# Patient Record
Sex: FEMALE | Race: WHITE | NOT HISPANIC OR LATINO | URBAN - METROPOLITAN AREA
[De-identification: names, ages, dates, MRNs, and addresses within clinical notes are randomized per-mention and may not be internally consistent; named-entity substitution may affect disease eponyms.]

---

## 2017-03-11 ENCOUNTER — EMERGENCY (EMERGENCY)
Facility: HOSPITAL | Age: 82
LOS: 0 days | Discharge: HOME | End: 2017-03-11
Admitting: INTERNAL MEDICINE

## 2017-06-27 DIAGNOSIS — E78.00 PURE HYPERCHOLESTEROLEMIA, UNSPECIFIED: ICD-10-CM

## 2017-06-27 DIAGNOSIS — R05 COUGH: ICD-10-CM

## 2017-06-27 DIAGNOSIS — Z90.49 ACQUIRED ABSENCE OF OTHER SPECIFIED PARTS OF DIGESTIVE TRACT: ICD-10-CM

## 2017-06-27 DIAGNOSIS — Z98.891 HISTORY OF UTERINE SCAR FROM PREVIOUS SURGERY: ICD-10-CM

## 2017-06-27 DIAGNOSIS — J40 BRONCHITIS, NOT SPECIFIED AS ACUTE OR CHRONIC: ICD-10-CM

## 2017-06-27 DIAGNOSIS — R19.7 DIARRHEA, UNSPECIFIED: ICD-10-CM

## 2017-06-27 DIAGNOSIS — E78.5 HYPERLIPIDEMIA, UNSPECIFIED: ICD-10-CM

## 2017-06-27 DIAGNOSIS — I10 ESSENTIAL (PRIMARY) HYPERTENSION: ICD-10-CM

## 2018-07-26 ENCOUNTER — INPATIENT (INPATIENT)
Facility: HOSPITAL | Age: 83
LOS: 10 days | Discharge: NURSING, REHAB OR LONG-TERM CARE | End: 2018-08-06
Attending: INTERNAL MEDICINE | Admitting: INTERNAL MEDICINE
Payer: MEDICARE

## 2018-07-26 VITALS
DIASTOLIC BLOOD PRESSURE: 68 MMHG | RESPIRATION RATE: 18 BRPM | HEIGHT: 64 IN | HEART RATE: 76 BPM | TEMPERATURE: 98 F | WEIGHT: 169.98 LBS | SYSTOLIC BLOOD PRESSURE: 136 MMHG | OXYGEN SATURATION: 96 %
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
## 2018-07-26 DIAGNOSIS — I25.5 ISCHEMIC CARDIOMYOPATHY: ICD-10-CM

## 2018-07-26 DIAGNOSIS — S22.089A UNSPECIFIED FRACTURE OF T11-T12 VERTEBRA, INITIAL ENCOUNTER FOR CLOSED FRACTURE: ICD-10-CM

## 2018-07-26 DIAGNOSIS — M54.9 DORSALGIA, UNSPECIFIED: ICD-10-CM

## 2018-07-26 DIAGNOSIS — I25.2 OLD MYOCARDIAL INFARCTION: ICD-10-CM

## 2018-07-26 DIAGNOSIS — E87.1 HYPO-OSMOLALITY AND HYPONATREMIA: ICD-10-CM

## 2018-07-26 DIAGNOSIS — I25.10 ATHEROSCLEROTIC HEART DISEASE OF NATIVE CORONARY ARTERY WITHOUT ANGINA PECTORIS: ICD-10-CM

## 2018-07-26 DIAGNOSIS — E78.5 HYPERLIPIDEMIA, UNSPECIFIED: ICD-10-CM

## 2018-07-26 DIAGNOSIS — Z79.82 LONG TERM (CURRENT) USE OF ASPIRIN: ICD-10-CM

## 2018-07-26 DIAGNOSIS — M19.90 UNSPECIFIED OSTEOARTHRITIS, UNSPECIFIED SITE: ICD-10-CM

## 2018-07-26 DIAGNOSIS — R91.1 SOLITARY PULMONARY NODULE: ICD-10-CM

## 2018-07-26 DIAGNOSIS — I21.9 ACUTE MYOCARDIAL INFARCTION, UNSPECIFIED: ICD-10-CM

## 2018-07-26 DIAGNOSIS — S32.402A UNSPECIFIED FRACTURE OF LEFT ACETABULUM, INITIAL ENCOUNTER FOR CLOSED FRACTURE: ICD-10-CM

## 2018-07-26 DIAGNOSIS — E87.6 HYPOKALEMIA: ICD-10-CM

## 2018-07-26 DIAGNOSIS — I10 ESSENTIAL (PRIMARY) HYPERTENSION: ICD-10-CM

## 2018-07-26 DIAGNOSIS — S32.502A UNSPECIFIED FRACTURE OF LEFT PUBIS, INITIAL ENCOUNTER FOR CLOSED FRACTURE: ICD-10-CM
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
## 2018-07-26 NOTE — ED PROVIDER NOTE - PROGRESS NOTE DETAILS
Imaging reviewed. Labs sent. Will transfer north for trauma consult. case d/w Dr Holley for transfer to  site. Trauma attending Dr Rodríguez aware. Pt seen at [facility].  Trauma consult placed-- spoke to trauma resident Pt seen at [facility].  Trauma consult placed-- spoke to trauma resident. ortho aware of pt. will come see her Pt evaluated after sign out. Trauma and ortho teams aware. Pt is comfortable now. WIll observe and reevaluate. neurosurg--- mri

## 2018-07-26 NOTE — ED PROVIDER NOTE - ATTENDING CONTRIBUTION TO CARE
89f w a hx of HTN & HLD presents w daughter after mechanical trip/fall while in [facility] 2 days prior. Pt reports pain to back and L hip. Pain is sharp, moderate, constant, no radiating, worse w moving. Pt reports hitting head but no LOC. Pt unable to ambulate after. Pt seen in hospital in [facility] and had XR showing T12 fx. No use of blood thinners.    Review of Systems  Constitutional:  No fever or chills.  Eyes:  Negative.  ENMT:  No nasal congestion, discharge, or throat pain.   Cardiac:  No chest pain, syncope, or edema.  Respiratory:  No dyspnea, wheezing, or cough. No hemoptysis.  GI:  No vomiting, diarrhea, or abdominal pain. No melena or hematochezia.  :  No dysuria or hematuria. Normal urine output, no incontinence/retention.   Musculoskeletal:  No myalgia or muscle weakness. +Back pain.  Skin:  No skin rash, jaundice, or lesions. No lacerations, abrasions, or ecchymosis.  Neuro:  No headache, loss of sensation, or focal weakness.  No saddle anesthesia or change in mental status.    Physical Exam  General: Awake, alert, NAD, elderly/frail, non-toxic appearing, NCAT  Eyes: PERRL, EOMI, no icterus, lids and conjunctivae are normal  ENT: External inspection normal, pink/moist membranes, pharynx normal  CV: S1S2, regular rate and rhythm, no murmur/gallops/rubs, no JVD, 2+ pulses b/l, no edema/cords/homans, warm/well-perfused  Respiratory: Normal respiratory rate/effort, no respiratory distress, normal voice, speaking full sentences, lungs clear to auscultation b/l, no wheezing/rales/rhonchi, no retractions, no stridor  Abdomen: Soft abdomen, no tender/distended/guarding/rebound, no CVA tender  Musculoskeletal: FROM all 4 extremities, N/V intact, pelvis stable, +mid-back spinal tender, no deform/step-offs, L hip tender, no deform, no other rebecca tender/deform  Neck: FROM neck, supple, no meningismus, trachea midline, no JVD, no cspine tender/step-offs  Integumentary: Color normal for race, warm and dry, no rash  Neuro: Oriented x3, CN 2-12 intact, normal motor, normal sensory, no saddle anesthesia, normal strength/sensation including distal toes b/l, normal gait, GCS 15  Psych: Oriented x3, mood normal, affect normal    89f w fall and back pain as well as hip pain. non-focal neuro exam, n/v intact. --XR's, Analgesia as needed, observe/re-assess.

## 2018-07-26 NOTE — ED PROVIDER NOTE - OBJECTIVE STATEMENT
Pt is a 89 y/o female with hx of HTN, DLD who presents to ED for left hip pain. Pt had mechanical fall while in [place] 2 days ago, fell backwards hitting head on floor. Pt denies LOC. Pt states went to ED there and found to have lumbar spine compression fx and sent home with tylenol. Pt not able to ambulate due to continuing left hip pain so came to ED for eval. No headache, neck pain, chest pain, SOB, abd pain. Pt is a 89 y/o female with hx of HTN, DLD who presents to ED for left hip pain. Pt had mechanical fall while in [place] 2 days ago, fell backwards hitting head on floor. Pt denies LOC. Pt states went to ED there and found to have T12 compression fx and sent home with tylenol. Pt not able to ambulate due to continuing left hip pain so came to ED for eval. No headache, neck pain, chest pain, SOB, abd pain.

## 2018-07-26 NOTE — ED PROVIDER NOTE - NS ED ROS FT
Constitutional: No altered mental status.  Eyes: No visual changes.  ENT: No hearing loss.  Neck: No neck pain or stiffness.  Cardiovascular: No chest pain, palpitations.  Pulmonary: No SOB. No hemoptysis.  Abdominal: No abdominal pain, nausea, vomiting.   : No hematuria.  Neuro: No headache, syncope, dizziness.  MS: No back pain. No deformities. + left hip pain.  Psych: No suicidal ideations.

## 2018-07-26 NOTE — ED PROVIDER NOTE - PHYSICAL EXAMINATION
Constitutional: Well developed, well nourished. NAD  TRAUMA: ABC intact. GCS 15.  Head: Normocephalic, atraumatic.  Eyes: PERRL. EOMI. No Raccoon eyes.   ENT: No nasal discharge. No septal hematoma. No Santoro sign. Mucous membranes moist.  Neck: Supple. Painless ROM. No midline tenderness, stepoffs.  Cardiovascular: Normal S1, S2. Regular rate and rhythm. No murmurs, rubs, or gallops.  Pulmonary: Normal respiratory rate and effort. Lungs clear to auscultation bilaterally. No wheezing, rales, or rhonchi.  CHEST: No chest wall tenderness, crepitus.  Abdominal: Soft. Nondistended. Nontender. No rebound, guarding, rigidity.  BACK: No midline T/L/S tenderness, stepoffs. No saddle paresthesia.  Extremities. Pelvis stable. + tenderness to left hip.  Skin: No rashes, cyanosis, lacerations, abrasions.  Neuro: AAOx3. Strength 5/5 in all extremities. Sensation intact throughout. No focal neurological deficits.  Psych: Normal mood. Normal affect. Constitutional: Well developed, well nourished. NAD  TRAUMA: ABC intact. GCS 15.  Head: Normocephalic, atraumatic.  Eyes: PERRL. EOMI. No Raccoon eyes.   ENT: No nasal discharge. No septal hematoma. No Santoro sign. Mucous membranes moist.  Neck: Supple. Painless ROM. No midline tenderness, stepoffs.  Cardiovascular: Normal S1, S2. Regular rate and rhythm. No murmurs, rubs, or gallops.  Pulmonary: Normal respiratory rate and effort. Lungs clear to auscultation bilaterally. No wheezing, rales, or rhonchi.  CHEST: No chest wall tenderness, crepitus.  Abdominal: Soft. Nondistended. Nontender. No rebound, guarding, rigidity.  BACK: No midline T/L/S tenderness, stepoffs. No saddle paresthesia.  Extremities. Pelvis stable. + tenderness to left hip. Full ROM of bilateral lower extremities.  Skin: No rashes, cyanosis, lacerations, abrasions.  Neuro: AAOx3. Strength 5/5 in all extremities. Sensation intact throughout. No focal neurological deficits.  Psych: Normal mood. Normal affect.

## 2018-07-26 NOTE — ED PROVIDER NOTE - CARE PLAN
Principal Discharge DX:	Pubic ramus fracture  Secondary Diagnosis:	Compression fracture of body of thoracic vertebra Principal Discharge DX:	Pubic ramus fracture  Secondary Diagnosis:	Compression fracture of body of thoracic vertebra  Secondary Diagnosis:	Fall, initial encounter

## 2018-07-27 DIAGNOSIS — Z98.891 HISTORY OF UTERINE SCAR FROM PREVIOUS SURGERY: Chronic | ICD-10-CM

## 2018-07-27 DIAGNOSIS — Z90.49 ACQUIRED ABSENCE OF OTHER SPECIFIED PARTS OF DIGESTIVE TRACT: Chronic | ICD-10-CM

## 2018-07-27 LAB
ALBUMIN SERPL ELPH-MCNC: 4.1 G/DL — SIGNIFICANT CHANGE UP (ref 3.5–5.2)
ALP SERPL-CCNC: 51 U/L — SIGNIFICANT CHANGE UP (ref 30–115)
ALT FLD-CCNC: 35 U/L — SIGNIFICANT CHANGE UP (ref 0–41)
ANION GAP SERPL CALC-SCNC: 18 MMOL/L — HIGH (ref 7–14)
APTT BLD: 25 SEC — LOW (ref 27–39.2)
APTT BLD: 25.7 SEC — LOW (ref 27–39.2)
AST SERPL-CCNC: 37 U/L — SIGNIFICANT CHANGE UP (ref 0–41)
BILIRUB SERPL-MCNC: 0.6 MG/DL — SIGNIFICANT CHANGE UP (ref 0.2–1.2)
BLD GP AB SCN SERPL QL: SIGNIFICANT CHANGE UP
BUN SERPL-MCNC: 22 MG/DL — HIGH (ref 10–20)
CALCIUM SERPL-MCNC: 9.3 MG/DL — SIGNIFICANT CHANGE UP (ref 8.5–10.1)
CHLORIDE SERPL-SCNC: 90 MMOL/L — LOW (ref 98–110)
CO2 SERPL-SCNC: 24 MMOL/L — SIGNIFICANT CHANGE UP (ref 17–32)
CREAT SERPL-MCNC: 0.9 MG/DL — SIGNIFICANT CHANGE UP (ref 0.7–1.5)
GLUCOSE SERPL-MCNC: 181 MG/DL — HIGH (ref 70–99)
HCT VFR BLD CALC: 43.6 % — SIGNIFICANT CHANGE UP (ref 37–47)
HGB BLD-MCNC: 14.8 G/DL — SIGNIFICANT CHANGE UP (ref 12–16)
INR BLD: 1.09 RATIO — SIGNIFICANT CHANGE UP (ref 0.65–1.3)
INR BLD: 1.19 RATIO — SIGNIFICANT CHANGE UP (ref 0.65–1.3)
MCHC RBC-ENTMCNC: 28.7 PG — SIGNIFICANT CHANGE UP (ref 27–31)
MCHC RBC-ENTMCNC: 33.9 G/DL — SIGNIFICANT CHANGE UP (ref 32–37)
MCV RBC AUTO: 84.7 FL — SIGNIFICANT CHANGE UP (ref 81–99)
NRBC # BLD: 0 /100 WBCS — SIGNIFICANT CHANGE UP (ref 0–0)
PLATELET # BLD AUTO: 218 K/UL — SIGNIFICANT CHANGE UP (ref 130–400)
POTASSIUM SERPL-MCNC: 3.2 MMOL/L — LOW (ref 3.5–5)
POTASSIUM SERPL-SCNC: 3.2 MMOL/L — LOW (ref 3.5–5)
PROT SERPL-MCNC: 7.1 G/DL — SIGNIFICANT CHANGE UP (ref 6–8)
PROTHROM AB SERPL-ACNC: 11.8 SEC — SIGNIFICANT CHANGE UP (ref 9.95–12.87)
PROTHROM AB SERPL-ACNC: 12.8 SEC — SIGNIFICANT CHANGE UP (ref 9.95–12.87)
RBC # BLD: 5.15 M/UL — SIGNIFICANT CHANGE UP (ref 4.2–5.4)
RBC # FLD: 13.5 % — SIGNIFICANT CHANGE UP (ref 11.5–14.5)
SODIUM SERPL-SCNC: 132 MMOL/L — LOW (ref 135–146)
TYPE + AB SCN PNL BLD: SIGNIFICANT CHANGE UP
WBC # BLD: 13.24 K/UL — HIGH (ref 4.8–10.8)
WBC # FLD AUTO: 13.24 K/UL — HIGH (ref 4.8–10.8)

## 2018-07-27 PROCEDURE — 99223 1ST HOSP IP/OBS HIGH 75: CPT

## 2018-07-27 RX ORDER — SENNA PLUS 8.6 MG/1
2 TABLET ORAL AT BEDTIME
Qty: 0 | Refills: 0 | Status: DISCONTINUED | OUTPATIENT
Start: 2018-07-27 | End: 2018-08-03

## 2018-07-27 RX ORDER — LISINOPRIL 2.5 MG/1
20 TABLET ORAL DAILY
Qty: 0 | Refills: 0 | Status: DISCONTINUED | OUTPATIENT
Start: 2018-07-27 | End: 2018-08-03

## 2018-07-27 RX ORDER — LISINOPRIL 2.5 MG/1
0 TABLET ORAL
Qty: 0 | Refills: 0 | COMMUNITY

## 2018-07-27 RX ORDER — MORPHINE SULFATE 50 MG/1
2 CAPSULE, EXTENDED RELEASE ORAL ONCE
Qty: 0 | Refills: 0 | Status: DISCONTINUED | OUTPATIENT
Start: 2018-07-27 | End: 2018-07-27

## 2018-07-27 RX ORDER — ACETAMINOPHEN 500 MG
650 TABLET ORAL EVERY 6 HOURS
Qty: 0 | Refills: 0 | Status: DISCONTINUED | OUTPATIENT
Start: 2018-07-27 | End: 2018-08-03

## 2018-07-27 RX ORDER — SIMVASTATIN 20 MG/1
40 TABLET, FILM COATED ORAL AT BEDTIME
Qty: 0 | Refills: 0 | Status: DISCONTINUED | OUTPATIENT
Start: 2018-07-27 | End: 2018-08-03

## 2018-07-27 RX ORDER — POTASSIUM CHLORIDE 20 MEQ
40 PACKET (EA) ORAL ONCE
Qty: 0 | Refills: 0 | Status: COMPLETED | OUTPATIENT
Start: 2018-07-27 | End: 2018-07-27

## 2018-07-27 RX ORDER — ENOXAPARIN SODIUM 100 MG/ML
40 INJECTION SUBCUTANEOUS AT BEDTIME
Qty: 0 | Refills: 0 | Status: DISCONTINUED | OUTPATIENT
Start: 2018-07-27 | End: 2018-08-03

## 2018-07-27 RX ORDER — HYDROCHLOROTHIAZIDE 25 MG
25 TABLET ORAL DAILY
Qty: 0 | Refills: 0 | Status: DISCONTINUED | OUTPATIENT
Start: 2018-07-27 | End: 2018-07-27

## 2018-07-27 RX ORDER — MORPHINE SULFATE 50 MG/1
2 CAPSULE, EXTENDED RELEASE ORAL
Qty: 0 | Refills: 0 | Status: DISCONTINUED | OUTPATIENT
Start: 2018-07-27 | End: 2018-07-31

## 2018-07-27 RX ORDER — ASPIRIN/CALCIUM CARB/MAGNESIUM 324 MG
81 TABLET ORAL DAILY
Qty: 0 | Refills: 0 | Status: DISCONTINUED | OUTPATIENT
Start: 2018-07-27 | End: 2018-08-03

## 2018-07-27 RX ORDER — DOCUSATE SODIUM 100 MG
100 CAPSULE ORAL
Qty: 0 | Refills: 0 | Status: DISCONTINUED | OUTPATIENT
Start: 2018-07-27 | End: 2018-08-03

## 2018-07-27 RX ORDER — SODIUM CHLORIDE 9 MG/ML
1000 INJECTION INTRAMUSCULAR; INTRAVENOUS; SUBCUTANEOUS
Qty: 0 | Refills: 0 | Status: DISCONTINUED | OUTPATIENT
Start: 2018-07-27 | End: 2018-07-29

## 2018-07-27 RX ORDER — SIMVASTATIN 20 MG/1
0 TABLET, FILM COATED ORAL
Qty: 0 | Refills: 0 | COMMUNITY

## 2018-07-27 RX ADMIN — Medication 40 MILLIEQUIVALENT(S): at 07:42

## 2018-07-27 RX ADMIN — MORPHINE SULFATE 2 MILLIGRAM(S): 50 CAPSULE, EXTENDED RELEASE ORAL at 20:22

## 2018-07-27 RX ADMIN — MORPHINE SULFATE 2 MILLIGRAM(S): 50 CAPSULE, EXTENDED RELEASE ORAL at 02:41

## 2018-07-27 RX ADMIN — SIMVASTATIN 40 MILLIGRAM(S): 20 TABLET, FILM COATED ORAL at 21:31

## 2018-07-27 RX ADMIN — Medication 25 MILLIGRAM(S): at 07:43

## 2018-07-27 RX ADMIN — MORPHINE SULFATE 2 MILLIGRAM(S): 50 CAPSULE, EXTENDED RELEASE ORAL at 08:15

## 2018-07-27 RX ADMIN — MORPHINE SULFATE 2 MILLIGRAM(S): 50 CAPSULE, EXTENDED RELEASE ORAL at 07:59

## 2018-07-27 RX ADMIN — SENNA PLUS 2 TABLET(S): 8.6 TABLET ORAL at 21:31

## 2018-07-27 RX ADMIN — ENOXAPARIN SODIUM 40 MILLIGRAM(S): 100 INJECTION SUBCUTANEOUS at 21:31

## 2018-07-27 RX ADMIN — SODIUM CHLORIDE 75 MILLILITER(S): 9 INJECTION INTRAMUSCULAR; INTRAVENOUS; SUBCUTANEOUS at 02:42

## 2018-07-27 RX ADMIN — LISINOPRIL 20 MILLIGRAM(S): 2.5 TABLET ORAL at 07:43

## 2018-07-27 RX ADMIN — Medication 81 MILLIGRAM(S): at 12:08

## 2018-07-27 RX ADMIN — Medication 1 TABLET(S): at 12:08

## 2018-07-27 RX ADMIN — Medication 100 MILLIGRAM(S): at 17:06

## 2018-07-27 RX ADMIN — SODIUM CHLORIDE 75 MILLILITER(S): 9 INJECTION INTRAMUSCULAR; INTRAVENOUS; SUBCUTANEOUS at 05:27

## 2018-07-27 NOTE — CONSULT NOTE ADULT - SUBJECTIVE AND OBJECTIVE BOX
Patient is a 89y old  Female who presents with a chief complaint of s/p fall ,transferred fro [facility] for trauma work up  found to have T 12 and pelvic fracture (2018 06:13)      HPI:  89 F with Hx of HTN, HLD ,arthritis ,transferred from [facility] for trauma work up .   She was complaining of  back and Left lateral thigh pain of 2 day duration after a mechanical fall at a hotel in [facility].  Patient stated  she was waiting for elevator when she tripped and fall backwards, landing on her left posterior hip and back.  Patient denies LOC.  Visited an [facility] where she was prescribed tylenol , [place] returning to the [place] she came straight to the ED here at [facility] first and than was transferred here at [place] for further work up.    She also  reported  limiting her mobility due to exacerbation of her back pain.  Pain is localized to the left lumbar region and hip.    pt was takings aspirin 325 mg daily ,as per pt it helped with her arthritis and good for heart.  no c/o headache, N/V ,fever and chills ,no c/o urinary or fecal incontinence ,neither any weakness or paresthesias     In ER ;  · Temp (F): 97.8	  · Temp (C) Temp (C): 36.6	  · Temp site Temp Site: oral	  · Heart Rate Heart Rate (beats/min): 76	  · BP Systolic Systolic: 136	  · BP Diastolic Diastolic (mm Hg): 68	  · Respiration Rate (breaths/min) Respiration Rate (breaths/min): 18	  · SpO2 (%) SpO2 (%): 96	  · O2 delivery Patient On: room air	    trauma work up done;  found to have Left acetabular and T12 compression fx   trauma, ortho and Neurosurgery consulted  later ,pt was cleaerd by trauma, no intervention as per ortho and NS has requested MRI of lumber and thoracic spine  got IV morphine and 1 L NS in ER (2018 06:13)      PAST MEDICAL & SURGICAL HISTORY:  Arthritis  High cholesterol  Hypertension  History of   S/P cholecystectomy      SOCIAL HX:  FAMILY HISTORY:  No pertinent family history in first degree relatives    Allergies    No Known Allergies    Intolerances        PHYSICAL EXAM  Vital Signs Last 24 Hrs  T(C): 36.2 (2018 16:04), Max: 36.8 (2018 00:39)  T(F): 97.1 (2018 16:04), Max: 98.2 (2018 00:39)  HR: 77 (2018 16:04) (76 - 81)  BP: 122/77 (2018 16:04) (122/77 - 151/68)  BP(mean): --  RR: 18 (2018 16:04) (18 - 18)  SpO2: 99% (2018 16:04) (96% - 99%)  I&O's Summary      General: Comfortable in bed  HEENT:  On NC  Lymph node: No palpable LN             Lungs: CTA  Cardiovascular: RRR, S1S2  Abdomen: BS+ve; soft non tender  Extremities: No LE edema. Pain on palpation of left hip  Skin:  No evident Rash  Neurological:  AAOx3; No focal deficit    LABS:                          14.8   13.24 )-----------( 218      ( 2018 00:35 )             43.6       07-27    132<L>  |  90<L>  |  22<H>  ----------------------------<  181<H>  3.2<L>   |  24  |  0.9    Ca    9.3      2018 00:35    TPro  7.1  /  Alb  4.1  /  TBili  0.6  /  DBili  x   /  AST  37  /  ALT  35  /  AlkPhos  51  07-27      PT/INR - ( 2018 02:06 )   PT: 12.80 sec;   INR: 1.19 ratio         PTT - ( 2018 02:06 )  PTT:25.7 sec              LIVER FUNCTIONS - ( 2018 00:35 )  Alb: 4.1 g/dL / Pro: 7.1 g/dL / ALK PHOS: 51 U/L / ALT: 35 U/L / AST: 37 U/L / GGT: x               MEDICATIONS  (STANDING):  aspirin  chewable 81 milliGRAM(s) Oral daily  docusate sodium 100 milliGRAM(s) Oral two times a day  enoxaparin Injectable 40 milliGRAM(s) SubCutaneous at bedtime  hydrochlorothiazide 25 milliGRAM(s) Oral daily  lisinopril 20 milliGRAM(s) Oral daily  multivitamin 1 Tablet(s) Oral daily  senna 2 Tablet(s) Oral at bedtime  simvastatin 40 milliGRAM(s) Oral at bedtime  sodium chloride 0.9%. 1000 milliLiter(s) (75 mL/Hr) IV Continuous <Continuous>    MEDICATIONS  (PRN):  acetaminophen   Tablet 650 milliGRAM(s) Oral every 6 hours PRN pain  morphine  - Injectable 2 milliGRAM(s) IV Push five times a day PRN Severe Pain (7 - 10)      Radiology:  < from: Xray Chest 1 View- PORTABLE-Urgent (18 @ 02:39) >  Impression:      No radiographic evidence of acute cardiopulmonary disease.    < end of copied text >    < from: CT Chest w/ IV Cont (18 @ 03:01) >  IMPRESSION:   1. Acute, nondisplaced left inferior pubic ramus and left acetabular   column fractures.  2. New severe T12 vertebral body compression fracture with minimal   osseous retropulsion; age indeterminate and possibly acute.  3. Multiple left lower lobe pulmonary nodules, measuring up to 0.9 cm.   Per Fleischner Society 2017 guidelines, follow-up CT chest in 3-6 months   recommended.    < end of copied text >

## 2018-07-27 NOTE — H&P ADULT - ASSESSMENT
89 F with Hx of HTN, HLD ,arthritis ,transferred from [facility] for trauma work up  after a mechanical fall 2 days ago ,found to have pelvic and T12 fx.    # s/p mechanical fall;  L acetabular fx and T 12 fx  cleared by trauma   Weight bearing as tolerated as per ortho  no intervention for pelvic fx as per ortho  Neuro Surgery ; for T 12  spine fx >> MRI T and L spine recommended  no weakness or spinal anesthesia ,no urinary or fecal incontinence   cw pain control   PT/rehab after NS recs    #HTN/DLD;  cw lisinopril and HCTZ and statin   aspirin dose decreased to 81 mg     #DVT ppx; cw lovenox    #Diet; DASH    #activity ; as tolerated  Weight bearing as tolerated as per ortho    disposal; Pending Neurosurgery recs and MRI L/T spine 89 F with Hx of HTN, HLD ,arthritis ,transferred from [facility] for trauma work up  after a mechanical fall 2 days ago ,found to have pelvic and T12 fx.    # s/p mechanical fall;  L acetabular fx and T 12 fx  cleared by trauma   Weight bearing as tolerated as per ortho  no intervention for pelvic fx as per ortho  Neuro Surgery ; for T 12  spine fx >> MRI T and L spine recommended  no weakness or spinal anesthesia ,no urinary or fecal incontinence   cw pain control   PT/rehab after NS recs    #HTN/DLD;  cw lisinopril and HCTZ and statin   aspirin dose decreased to 81 mg     #Hypokalemia;  repleted ,follow 11 am BMP     #DVT ppx; cw lovenox    #Diet; DASH    #activity ; as tolerated  Weight bearing as tolerated as per ortho    disposal; Pending Neurosurgery recs and MRI L/T spine

## 2018-07-27 NOTE — PATIENT PROFILE ADULT. - VISION (WITH CORRECTIVE LENSES IF THE PATIENT USUALLY WEARS THEM):
Partially impaired: cannot see medication labels or newsprint, but can see obstacles in path, and the surrounding layout; can count fingers at arm's length/uses glasses for reading

## 2018-07-27 NOTE — CONSULT NOTE ADULT - SUBJECTIVE AND OBJECTIVE BOX
Pt Name: LOW GUZMÁN  MRN: [id number]  Orthopedic Consult:Pelvic Fx      HPI: Patient is a 89y old  Female who presents with a chief complaint of L groin pain. Patient had a fall while on vacation 2 days ago. difficulty ambulating since that time.  Patient denies head trauma or LOC. Also complaining of back pain. Denies paresthesias.      PAST MEDICAL & SURGICAL HISTORY:  High cholesterol  Hypertension  No significant past surgical history      Allergies: No Known Allergies      Medications: sodium chloride 0.9%. 1000 milliLiter(s) IV Continuous <Continuous>        PHYSICAL EXAM:    Vital Signs Last 24 Hrs  T(C): 36.8 (27 Jul 2018 00:39), Max: 36.8 (27 Jul 2018 00:39)  T(F): 98.2 (27 Jul 2018 00:39), Max: 98.2 (27 Jul 2018 00:39)  HR: 80 (27 Jul 2018 00:39) (76 - 80)  BP: 130/63 (27 Jul 2018 00:39) (130/63 - 136/68)  BP(mean): --  RR: 18 (27 Jul 2018 00:39) (18 - 18)  SpO2: 98% (27 Jul 2018 00:39) (96% - 98%)    Physical Exam:  General: NAD, Alert, Awake and oriented  Neurologic: No gross deficits, moving all 4s.  Head: NCAT without abrasions, lacerations, or ecchymosis to head, face, or scalp  Musculoskeletal:          HIPS and PELVIS: Pelvis stable to AP and Lat compression.  TTP to AP and Lateral compression. Unable to actively SLR on L.  Negative Log Roll, Negative Heel strike bilaterally. No pain on internal/external rotation of the hips.      LEFT LE: No open skin. No deformities  or other signs of trauma at hip, thigh, knee, leg, ankle or foot.  Negative log-roll and heel strike. SILT toes 1-5. No bony TTP to hip, knee or ankle. 2+ DP/PT pulses with brisk cap refill distally. Compartments soft and compressible. No pain on passive stretch. Strength 5/5.                            14.8   13.24 )-----------( 218      ( 27 Jul 2018 00:35 )             43.6     07-27    132<L>  |  90<L>  |  22<H>  ----------------------------<  181<H>  3.2<L>   |  24  |  0.9    Ca    9.3      27 Jul 2018 00:35    TPro  7.1  /  Alb  4.1  /  TBili  0.6  /  DBili  x   /  AST  37  /  ALT  35  /  AlkPhos  51  07-27    PT/INR - ( 27 Jul 2018 02:06 )   PT: 12.80 sec;   INR: 1.19 ratio         PTT - ( 27 Jul 2018 02:06 )  PTT:25.7 sec    Imaging:Superior and inferior pubic rami fracture on L. T12 compression fracture    A/P:    Pt is a  89y Female with LC1 variant pelvic fx  -No acute orthopedic intervention  - Pain control  - Care of spine fx per trauma  - From pelvic perspective, WBAT BLE  -PT/OT  - OOBTC  - DVT ppx Pt Name: LOW GUZMÁN  MRN: [id number]  Orthopedic Consult:Pelvic Fx      HPI: Patient is a 89y old  Female who presents with a chief complaint of L groin pain. Patient had a fall while on vacation 2 days ago. difficulty ambulating since that time.  Patient denies head trauma or LOC. Also complaining of back pain. Denies paresthesias.      PAST MEDICAL & SURGICAL HISTORY:  High cholesterol  Hypertension  No significant past surgical history      Allergies: No Known Allergies      Medications: sodium chloride 0.9%. 1000 milliLiter(s) IV Continuous <Continuous>        PHYSICAL EXAM:    Vital Signs Last 24 Hrs  T(C): 36.8 (27 Jul 2018 00:39), Max: 36.8 (27 Jul 2018 00:39)  T(F): 98.2 (27 Jul 2018 00:39), Max: 98.2 (27 Jul 2018 00:39)  HR: 80 (27 Jul 2018 00:39) (76 - 80)  BP: 130/63 (27 Jul 2018 00:39) (130/63 - 136/68)  BP(mean): --  RR: 18 (27 Jul 2018 00:39) (18 - 18)  SpO2: 98% (27 Jul 2018 00:39) (96% - 98%)    Physical Exam:  General: NAD, Alert, Awake and oriented  Neurologic: No gross deficits, moving all 4s.  Head: NCAT without abrasions, lacerations, or ecchymosis to head, face, or scalp  Musculoskeletal:          HIPS and PELVIS: Pelvis stable to AP and Lat compression.  TTP to AP and Lateral compression. Unable to actively SLR on L.  Negative Log Roll, Negative Heel strike bilaterally. No pain on internal/external rotation of the hips.      LEFT LE: No open skin. No deformities  or other signs of trauma at hip, thigh, knee, leg, ankle or foot.  Negative log-roll and heel strike. SILT toes 1-5. No bony TTP to hip, knee or ankle. 2+ DP/PT pulses with brisk cap refill distally. Compartments soft and compressible. No pain on passive stretch. Strength 5/5.                            14.8   13.24 )-----------( 218      ( 27 Jul 2018 00:35 )             43.6     07-27    132<L>  |  90<L>  |  22<H>  ----------------------------<  181<H>  3.2<L>   |  24  |  0.9    Ca    9.3      27 Jul 2018 00:35    TPro  7.1  /  Alb  4.1  /  TBili  0.6  /  DBili  x   /  AST  37  /  ALT  35  /  AlkPhos  51  07-27    PT/INR - ( 27 Jul 2018 02:06 )   PT: 12.80 sec;   INR: 1.19 ratio         PTT - ( 27 Jul 2018 02:06 )  PTT:25.7 sec    Imaging:Superior and inferior pubic rami fracture on L. T12 compression fracture    A/P:    Pt is a  89y Female with LC1 variant pelvic fx  -No acute orthopedic intervention  - Pain control  - Care of spine fx per trauma  - From pelvic perspective, WBAT BLE  -PT/OT  - OOBTC  - DVT ppx  pt seen and examined  agree with plan

## 2018-07-27 NOTE — CONSULT NOTE ADULT - ASSESSMENT
IMPRESSION:  Pulmonary nodules      PLAN:  Pt will need a repeat CT chest in 3-6 months  Follow up with Dr. Donnelly in [facility] upon discharge IMPRESSION:  Pulmonary nodules      PLAN:  Pt will need a repeat CT chest and follow up with Dr. Donnelly in 6 months

## 2018-07-27 NOTE — H&P ADULT - NSHPLABSRESULTS_GEN_ALL_CORE
14.8   13.24 )-----------( 218      ( 27 Jul 2018 00:35 )             43.6   07-27    132<L>  |  90<L>  |  22<H>  ----------------------------<  181<H>  3.2<L>   |  24  |  0.9    Ca    9.3      27 Jul 2018 00:35    TPro  7.1  /  Alb  4.1  /  TBili  0.6  /  DBili  x   /  AST  37  /  ALT  35  /  AlkPhos  51  07-27  LIVER FUNCTIONS - ( 27 Jul 2018 00:35 )  Alb: 4.1 g/dL / Pro: 7.1 g/dL / ALK PHOS: 51 U/L / ALT: 35 U/L / AST: 37 U/L / GGT: x         PT/INR - ( 27 Jul 2018 02:06 )   PT: 12.80 sec;   INR: 1.19 ratio         PTT - ( 27 Jul 2018 02:06 )  PTT:25.7 sec    < from: CT Abdomen and Pelvis w/ IV Cont (07.27.18 @ 03:01) >    IMPRESSION:   1. Acute, nondisplaced left inferior pubic ramus and left acetabular   column fractures.  2. New severe T12 vertebral body compression fracture with minimal   osseous retropulsion; age indeterminate and possibly acute.  3. Multiple left lower lobe pulmonary nodules, measuring up to 0.9 cm.   Per Fleischner Society 2017 guidelines, follow-up CT chest in 3-6 months   recommended.    < end of copied text >    < from: CT Head No Cont (07.27.18 @ 02:57) >    IMPRESSION:    No CT evidence of acute intracranial pathology.      < end of copied text >    < from: CT Cervical Spine No Cont (07.27.18 @ 02:57) >    Impression:    No evidence of acute fracture or subluxation of the cervical spine.    < end of copied text >

## 2018-07-27 NOTE — H&P ADULT - NSHPPHYSICALEXAM_GEN_ALL_CORE
Vital Signs Last 24 Hrs  T(C): 36.7 (27 Jul 2018 05:30), Max: 36.8 (27 Jul 2018 00:39)  T(F): 98 (27 Jul 2018 05:30), Max: 98.2 (27 Jul 2018 00:39)  HR: 81 (27 Jul 2018 05:30) (76 - 81)  BP: 132/68 (27 Jul 2018 05:30) (130/63 - 136/68)  BP(mean): --  RR: 18 (27 Jul 2018 05:30) (18 - 18)  SpO2: 97% (27 Jul 2018 05:30) (96% - 98%)    PHYSICAL EXAM:      Constitutional: no acute distress    Eyes: no pallor or icterus    ENMT: within normal range    Respiratory: VB +o    Cardiovascular: S1 +S2 +0    Gastrointestinal: soft ,non tender ,BS + ,no HS megaly    Extremities: no LE edema    Neurological: AAO *3,non focal   cn 2-12 intact  power 5/5   sensation intact

## 2018-07-27 NOTE — CONSULT NOTE ADULT - SUBJECTIVE AND OBJECTIVE BOX
SURGERY CONSULT    This is 89F with PMH of HTN, HLD who comes to the ED reporting back pain of 2 day duration after a mechanical fall at a hotel in [facility].  Patient states she was waiting for elevator when she tripped and fall backwards, landing on her left posterior hip and back.  Patient denies LOC.  Visited an [facility] where she was prescribed tylenol; [place] came straight to the ED here at [facility].  Tylenol has provided no relief, she reports limiting her mobility due to exacerbation of her back pain.  Pain is localized to the left lumbar region and hip.  Denies any anticoagulation or ASA use.  Denies head trauma.      PAST MEDICAL & SURGICAL HISTORY:  High cholesterol  Hypertension  No significant past surgical history    Home Meds: Home Medications:  lisinopril:  (26 Jul 2018 22:01)  simvastatin:  (26 Jul 2018 22:01)    Allergies: Allergies    No Known Allergies    Intolerances    Soc:   Advanced Directives: Presumed Full Code     CURRENT MEDICATIONS:   --------------------------------------------------------------------------------------  Neurologic Medications  morphine  - Injectable 2 milliGRAM(s) IV Push Once    Respiratory Medications    Cardiovascular Medications    Gastrointestinal Medications  sodium chloride 0.9%. 1000 milliLiter(s) IV Continuous <Continuous>    Genitourinary Medications    Hematologic/Oncologic Medications    Antimicrobial/Immunologic Medications    Endocrine/Metabolic Medications    Topical/Other Medications    --------------------------------------------------------------------------------------    VITAL SIGNS, INS/OUTS (last 24 hours):  --------------------------------------------------------------------------------------  ICU Vital Signs Last 24 Hrs  T(C): 36.8 (27 Jul 2018 00:39), Max: 36.8 (27 Jul 2018 00:39)  T(F): 98.2 (27 Jul 2018 00:39), Max: 98.2 (27 Jul 2018 00:39)  HR: 80 (27 Jul 2018 00:39) (76 - 80)  BP: 130/63 (27 Jul 2018 00:39) (130/63 - 136/68)  BP(mean): --  ABP: --  ABP(mean): --  RR: 18 (27 Jul 2018 00:39) (18 - 18)  SpO2: 98% (27 Jul 2018 00:39) (96% - 98%)    I&O's Summary    --------------------------------------------------------------------------------------    EXAM:  General/Neuro  GCS:   Exam: Normal, NAD, alert, oriented x 3.      Respiratory  Exam: Lungs clear to auscultation    Cardiovascular  Exam: S1, S2.  Regular rate and rhythm.    GI  Exam: Abdomen soft, Non-tender, Non-distended.      Extremities  Exam: Extremities warm.  Pain upon movement of her left lower limb.  Pain at the back.    Derm:  Exam: Good skin turgor, no skin breakdown.      LABS  --------------------------------------------------------------------------------------  Labs:  CAPILLARY BLOOD GLUCOSE                              14.8   13.24 )-----------( 218      ( 27 Jul 2018 00:35 )             43.6         07-27    132<L>  |  90<L>  |  22<H>  ----------------------------<  181<H>  3.2<L>   |  24  |  0.9      Calcium, Total Serum: 9.3 mg/dL (07-27-18 @ 00:35)      LFTs:             7.1  | 0.6  | 37       ------------------[51      ( 27 Jul 2018 00:35 )  4.1  | x    | 35          Lipase:x      Amylase:x             Coags:     11.80  ----< 1.09    ( 27 Jul 2018 00:35 )     25.0          IMAGING:      < from: Xray Hip 2-3 Views, Left (07.26.18 @ 22:57) >  Impression:  Evidence of acute, left inferior and superior pubic rami fractures.     < end of copied text >      --------------------------------------------------------------------------------------      ASSESSMENT/ PLAN:  89y Female s/p mechanical fall with lumbar and hip pain.  -CT head/neck/chest/lumbar spine  -Left inferior and superior pubic rami fractures  -IVF  -Ortho consult  -Diet  -DVT prophylaxis  -GI prophylaxis SURGERY CONSULT    This is 89F with PMH of HTN, HLD who comes to the ED reporting back pain of 2 day duration after a mechanical fall at a hotel in [facility].  Patient states she was waiting for elevator when she tripped and fall backwards, landing on her left posterior hip and back.  Patient denies LOC.  Visited an [facility] where she was prescribed tylenol; [place] came straight to the ED here at [facility].  Tylenol has provided no relief, she reports limiting her mobility due to exacerbation of her back pain.  Pain is localized to the left lumbar region and hip.  Denies any anticoagulation or ASA use.  Denies head trauma.      PAST MEDICAL & SURGICAL HISTORY:  High cholesterol  Hypertension  No significant past surgical history    Home Meds: Home Medications:  lisinopril:  (26 Jul 2018 22:01)  simvastatin:  (26 Jul 2018 22:01)    Allergies: Allergies    No Known Allergies    Intolerances    Soc:   Advanced Directives: Presumed Full Code     CURRENT MEDICATIONS:   --------------------------------------------------------------------------------------  Neurologic Medications  morphine  - Injectable 2 milliGRAM(s) IV Push Once    Respiratory Medications    Cardiovascular Medications    Gastrointestinal Medications  sodium chloride 0.9%. 1000 milliLiter(s) IV Continuous <Continuous>    Genitourinary Medications    Hematologic/Oncologic Medications    Antimicrobial/Immunologic Medications    Endocrine/Metabolic Medications    Topical/Other Medications    --------------------------------------------------------------------------------------    VITAL SIGNS, INS/OUTS (last 24 hours):  --------------------------------------------------------------------------------------  ICU Vital Signs Last 24 Hrs  T(C): 36.8 (27 Jul 2018 00:39), Max: 36.8 (27 Jul 2018 00:39)  T(F): 98.2 (27 Jul 2018 00:39), Max: 98.2 (27 Jul 2018 00:39)  HR: 80 (27 Jul 2018 00:39) (76 - 80)  BP: 130/63 (27 Jul 2018 00:39) (130/63 - 136/68)  BP(mean): --  ABP: --  ABP(mean): --  RR: 18 (27 Jul 2018 00:39) (18 - 18)  SpO2: 98% (27 Jul 2018 00:39) (96% - 98%)    I&O's Summary    --------------------------------------------------------------------------------------    EXAM:  General/Neuro  GCS:   Exam: Normal, NAD, alert, oriented x 3.      Respiratory  Exam: Lungs clear to auscultation    Cardiovascular  Exam: S1, S2.  Regular rate and rhythm.    GI  Exam: Abdomen soft, Non-tender, Non-distended.      Extremities  Exam: Extremities warm.  Pain upon movement of her left lower limb.  Pain at the back.    Derm:  Exam: Good skin turgor, no skin breakdown.      LABS  --------------------------------------------------------------------------------------  Labs:  CAPILLARY BLOOD GLUCOSE                              14.8   13.24 )-----------( 218      ( 27 Jul 2018 00:35 )             43.6         07-27    132<L>  |  90<L>  |  22<H>  ----------------------------<  181<H>  3.2<L>   |  24  |  0.9      Calcium, Total Serum: 9.3 mg/dL (07-27-18 @ 00:35)      LFTs:             7.1  | 0.6  | 37       ------------------[51      ( 27 Jul 2018 00:35 )  4.1  | x    | 35          Lipase:x      Amylase:x             Coags:     11.80  ----< 1.09    ( 27 Jul 2018 00:35 )     25.0          IMAGING:      < from: Xray Hip 2-3 Views, Left (07.26.18 @ 22:57) >  Impression:  Evidence of acute, left inferior and superior pubic rami fractures.     < end of copied text >  < from: CT Head No Cont (07.27.18 @ 02:57) >    IMPRESSION:    No CT evidence of acute intracranial pathology.      < end of copied text >  < from: CT Cervical Spine No Cont (07.27.18 @ 02:57) >  Impression:    No evidence of acute fracture or subluxation of the cervical spine.      < end of copied text >    < from: CT Chest w/ IV Cont (07.27.18 @ 03:01) >  1. Acute, nondisplaced left inferior pubic ramus and left acetabular   column fractures.  2. New severe T12 vertebral body compression fracture with minimal   osseous retropulsion; age indeterminate and possibly acute.    < end of copied text >    --------------------------------------------------------------------------------------

## 2018-07-27 NOTE — PATIENT PROFILE ADULT. - ABILITY TO HEAR (WITH HEARING AID OR HEARING APPLIANCE IF NORMALLY USED):
has hearing aids at bedside/Mildly to Moderately Impaired: difficulty hearing in some environments or speaker may need to increase volume or speak distinctly

## 2018-07-27 NOTE — CONSULT NOTE ADULT - ASSESSMENT
IMPRESSION: Rehab of left pelvic fx / T12 comp fx      PRECAUTIONS: [  ] Cardiac  [  ] Respiratory  [  ] Seizures [  ] Contact Isolation  [  ] Droplet Isolation  [  ] Other    Weight Bearing Status:     RECOMMENDATION:    Out of Bed to Chair     DVT/Decubiti Prophylaxis    REHAB PLAN:     [x   ] Bedside P/T 3-5 times a week   [   ]   Bedside O/T  2-3 times a week             [   ] No Rehab Therapy Indicated                   [   ]  Speech Therapy   Conditioning/ROM                                    ADL  Bed Mobility                                               Conditioning/ROM  Transfers                                                     Bed Mobility  Sitting /Standing Balance                         Transfers                                        Gait Training                                               Sitting/Standing Balance  Stair Training [   ]Applicable                    Home equipment Eval                                                                        Splinting  [   ] Only      GOALS:   ADL   [   ]   Independent                    Transfers  [ x  ] Independent                          Ambulation  [ x  ] Independent     [ x   ] With device                            [   ]  CG                                                         [   ]  CG                                                                  [   ] CG                            [    ] Min A                                                   [   ] Min A                                                              [   ] Min  A          DISCHARGE PLAN:   [   ]  Good candidate for Intensive Rehabilitation/Hospital based-4A [facility]                                             Will tolerate 3hrs Intensive Rehab Daily                                       [ x   ]  Short Term Rehab in Skilled Nursing Facility                                vs       [  x  ]  Home with Outpatient or VN services                                         [    ]  Possible Candidate for Intensive Hospital based Rehab

## 2018-07-27 NOTE — H&P ADULT - ATTENDING COMMENTS
89 F with Hx of HTN, HLD ,arthritis ,transferred from [facility] for trauma work up .   She was complaining of  back and Left lateral thigh pain of 2 day duration after a mechanical fall at a hotel in [place].  Patient stated  she was waiting for elevator when she tripped and fall backwards, landing on her left posterior hip and back.  Patient denies LOC.  Visited an [facility] where she was prescribed tylenol , [place] came straight to the ED here at [facility] first and than was transferred here at [place] for further work up.    Pt seen and examined in ER- comfortable, no pain at present    chart reviewed, ortho eval noted    agree with plan above    f/u MRI, neurosurgery    PT/rehab    DC planning when cleared by neurosurgery    call or text with any questions or updates

## 2018-07-27 NOTE — H&P ADULT - HISTORY OF PRESENT ILLNESS
89 F with Hx of HTN, HLD ,arthritis ,transferred from [facility] for trauma work up .   She was complaining of  back and Left lateral thigh pain of 2 day duration after a mechanical fall at a hotel in [facility].  Patient stated  she was waiting for elevator when she tripped and fall backwards, landing on her left posterior hip and back.  Patient denies LOC.  Visited an [facility] where she was prescribed tylenol , [place] came straight to the ED here at [facility] first and than was transferred here at [place] for further work up.    She also  reported  limiting her mobility due to exacerbation of her back pain.  Pain is localized to the left lumbar region and hip.    pt was takings aspirin 325 mg daily ,as per pt it helped with her arthritis and good for heart.  no c/o headache, N/V ,fever and chills ,no c/o urinary or fecal incontinence ,neither any weakness or paresthesias     In ER ;  · Temp (F): 97.8	  · Temp (C) Temp (C): 36.6	  · Temp site Temp Site: oral	  · Heart Rate Heart Rate (beats/min): 76	  · BP Systolic Systolic: 136	  · BP Diastolic Diastolic (mm Hg): 68	  · Respiration Rate (breaths/min) Respiration Rate (breaths/min): 18	  · SpO2 (%) SpO2 (%): 96	  · O2 delivery Patient On: room air	    trauma work up done;  found to have Left acetabular and T12 compression fx   trauma, ortho and Neurosurgery consulted  later ,pt was cleaerd by trauma, no intervention as per ortho and NS has requested MRI of lumber and thoracic spine 89 F with Hx of HTN, HLD ,arthritis ,transferred from [facility] for trauma work up .   She was complaining of  back and Left lateral thigh pain of 2 day duration after a mechanical fall at a hotel in [facility].  Patient stated  she was waiting for elevator when she tripped and fall backwards, landing on her left posterior hip and back.  Patient denies LOC.  Visited an [facility] where she was prescribed tylenol , [place] came straight to the ED here at [facility] first and than was transferred here at [place] for further work up.    She also  reported  limiting her mobility due to exacerbation of her back pain.  Pain is localized to the left lumbar region and hip.    pt was takings aspirin 325 mg daily ,as per pt it helped with her arthritis and good for heart.  no c/o headache, N/V ,fever and chills ,no c/o urinary or fecal incontinence ,neither any weakness or paresthesias     In ER ;  · Temp (F): 97.8	  · Temp (C) Temp (C): 36.6	  · Temp site Temp Site: oral	  · Heart Rate Heart Rate (beats/min): 76	  · BP Systolic Systolic: 136	  · BP Diastolic Diastolic (mm Hg): 68	  · Respiration Rate (breaths/min) Respiration Rate (breaths/min): 18	  · SpO2 (%) SpO2 (%): 96	  · O2 delivery Patient On: room air	    trauma work up done;  found to have Left acetabular and T12 compression fx   trauma, ortho and Neurosurgery consulted  later ,pt was cleaerd by trauma, no intervention as per ortho and NS has requested MRI of lumber and thoracic spine  got IV morphine and 1 L NS in ER

## 2018-07-27 NOTE — CONSULT NOTE ADULT - ASSESSMENT
ASSESSMENT/ PLAN:  89y Female s/p mechanical fall with lumbar and hip pain.  -T12 vertebral body fx care per neurosurgery  -Left inferior and superior pubic rami fractures care per ortho  -IVF  -Diet  -DVT prophylaxis  -GI prophylaxis  -as pt is 2 days out from her injury she is cleared from trauma perspective      Senior Trauma Resident Note  Airway intact  Bilateral Breath Sounds  Palpable pulses in 4 ext  GCS 15, PETERSON  VSS  No Subq emphysema, abdominal tenderness,  or pelvic instability   CXR and PXR negative  Ct findings above  Will Dispo accordingly  Plan as above d/w Dr Marcos Bello

## 2018-07-27 NOTE — CONSULT NOTE ADULT - SUBJECTIVE AND OBJECTIVE BOX
HPI:  89 F with Hx of HTN, HLD ,arthritis ,transferred from [facility] for trauma work up .   She was complaining of  back and Left lateral thigh pain of 2 day duration after a mechanical fall at a hotel in [facility].  Patient stated  she was waiting for elevator when she tripped and fall backwards, landing on her left posterior hip and back.  Patient denies LOC.  Visited an [facility] where she was prescribed tylenol , [place] came straight to the ED here at [facility] first and than was transferred here at [place] for further work up.    She also  reported  limiting her mobility due to exacerbation of her back pain.  Pain is localized to the left lumbar region and hip.    pt was takings aspirin 325 mg daily ,as per pt it helped with her arthritis and good for heart.  no c/o headache, N/V ,fever and chills ,no c/o urinary or fecal incontinence ,neither any weakness or paresthesias     In ER ;  · Temp (F): 97.8	  · Temp (C) Temp (C): 36.6	  · Temp site Temp Site: oral	  · Heart Rate Heart Rate (beats/min): 76	  · BP Systolic Systolic: 136	  · BP Diastolic Diastolic (mm Hg): 68	  · Respiration Rate (breaths/min) Respiration Rate (breaths/min): 18	  · SpO2 (%) SpO2 (%): 96	  · O2 delivery Patient On: room air	    trauma work up done;  found to have Left acetabular and T12 compression fx   trauma, ortho and Neurosurgery consulted  later ,pt was cleaerd by trauma, no intervention as per ortho and NS has requested MRI of lumber and thoracic spine  got IV morphine and 1 L NS in ER (2018 06:13)      PAST MEDICAL & SURGICAL HISTORY:  Arthritis  High cholesterol  Hypertension  History of   S/P cholecystectomy      Hospital Course:    TODAY'S SUBJECTIVE & REVIEW OF SYMPTOMS:     Constitutional WNL   Cardio WNL   Resp WNL   GI WNL  Heme WNL  Endo WNL  Skin WNL  MSK pain  Neuro WNL  Cognitive WNL  Psych WNL      MEDICATIONS  (STANDING):  aspirin  chewable 81 milliGRAM(s) Oral daily  enoxaparin Injectable 40 milliGRAM(s) SubCutaneous at bedtime  hydrochlorothiazide 25 milliGRAM(s) Oral daily  lisinopril 20 milliGRAM(s) Oral daily  multivitamin 1 Tablet(s) Oral daily  simvastatin 40 milliGRAM(s) Oral at bedtime  sodium chloride 0.9%. 1000 milliLiter(s) (75 mL/Hr) IV Continuous <Continuous>    MEDICATIONS  (PRN):  acetaminophen   Tablet 650 milliGRAM(s) Oral every 6 hours PRN pain  morphine  - Injectable 2 milliGRAM(s) IV Push five times a day PRN Severe Pain (7 - 10)      FAMILY HISTORY:  No pertinent family history in first degree relatives      Allergies    No Known Allergies    Intolerances        SOCIAL HISTORY:    [  ] Etoh  [  ] Smoking  [  ] Substance abuse     Home Environment:  [x  ] Home Alone  [  ] Lives with Family  [  ] Home Health Aid    Dwelling:  [  ] Apartment  [x  ] Private House  [  ] Adult Home  [  ] Skilled Nursing Facility      [  ] Short Term  [  ] Long Term  [x  ] Stairs       Elevator [  ]    FUNCTIONAL STATUS PTA: (Check all that apply)  Ambulation: [ x  ]Independent    [  ] Dependent     [  ] Non-Ambulatory  Assistive Device: [  ] SA Cane  [  ]  Q Cane  [  ] Walker  [  ]  Wheelchair  ADL : [ x ] Independent  [  ]  Dependent       Vital Signs Last 24 Hrs  T(C): 36.4 (2018 07:46), Max: 36.8 (2018 00:39)  T(F): 97.6 (2018 07:46), Max: 98.2 (2018 00:39)  HR: 76 (2018 07:46) (76 - 81)  BP: 151/68 (2018 07:46) (130/63 - 151/68)  BP(mean): --  RR: 18 (2018 07:46) (18 - 18)  SpO2: 99% (2018 07:46) (96% - 99%)      PHYSICAL EXAM: Alert & Oriented X3  GENERAL: NAD, well-groomed, well-developed  HEAD:  Atraumatic, Normocephalic  EYES: EOMI, PERRLA, conjunctiva and sclera clear  HEART: S1S2+  ABDOMEN: Soft, Nontender  EXTREMITIES:  no calf tenderness    NERVOUS SYSTEM:  Cranial Nerves 2-12 intact [  ] Abnormal  [  ]  ROM: WFL all extremities [  ]  Abnormal [x  ]limited lle  Motor Strength: WFL all extremities  [  ]  Abnormal [x  ]llmited lle  Sensation: intact to light touch [  ] Abnormal [  ]  Reflexes: Symmetric [  ]  Abnormal [  ]    FUNCTIONAL STATUS:  Bed Mobility: Independent [  ]  Supervision [  ]  Needs Assistance [x  ]  N/A [  ]  Transfers: Independent [  ]  Supervision [  ]  Needs Assistance [ x ]  N/A [  ]   Ambulation: Independent [  ]  Supervision [  ]  Needs Assistance [  ]  N/A [  ]  ADL: Independent [  ] Requires Assistance [  ] N/A [  ]      LABS:                        14.8   13.24 )-----------( 218      ( 2018 00:35 )             43.6     07-    132<L>  |  90<L>  |  22<H>  ----------------------------<  181<H>  3.2<L>   |  24  |  0.9    Ca    9.3      2018 00:35    TPro  7.1  /  Alb  4.1  /  TBili  0.6  /  DBili  x   /  AST  37  /  ALT  35  /  AlkPhos  51  07-27    PT/INR - ( 2018 02:06 )   PT: 12.80 sec;   INR: 1.19 ratio         PTT - ( 2018 02:06 )  PTT:25.7 sec      RADIOLOGY & ADDITIONAL STUDIES:    Assesment:

## 2018-07-27 NOTE — ED ADULT NURSE REASSESSMENT NOTE - NS ED NURSE REASSESS COMMENT FT1
PT transferred from [facility]. pt assessed, axox3, NAD, VSS, pain tolerable at present, awaiting got Neurosugery and Ortho consults. safety and comfort measures given. continue to monitor.

## 2018-07-27 NOTE — CONSULT NOTE ADULT - SUBJECTIVE AND OBJECTIVE BOX
LOW GUZMÁN  [id number]    Current Issues:      HPI:  89 F with Hx of HTN, HLD ,arthritis ,transferred from [facility] for trauma work up .   She was complaining of  back and Left lateral thigh pain of 2 day duration after a mechanical fall at a hotel in [facility].  Patient stated  she was waiting for elevator when she tripped and fall backwards, landing on her left posterior hip and back.  Patient denies LOC.  Visited an [facility] where she was prescribed tylenol , [place] the [place] came straight to the ED here at [facility] first and than was transferred here at [place] for further work up.    She also  reported  limiting her mobility due to exacerbation of her back pain.  Pain is localized to the left lumbar region and hip.    pt was takings aspirin 325 mg daily ,as per pt it helped with her arthritis and good for heart.  no c/o headache, N/V ,fever and chills ,no c/o urinary or fecal incontinence ,neither any weakness or paresthesias     In ER ;  · Temp (F): 97.8	  · Temp (C) Temp (C): 36.6	  · Temp site Temp Site: oral	  · Heart Rate Heart Rate (beats/min): 76	  · BP Systolic Systolic: 136	  · BP Diastolic Diastolic (mm Hg): 68	  · Respiration Rate (breaths/min) Respiration Rate (breaths/min): 18	  · SpO2 (%) SpO2 (%): 96	  · O2 delivery Patient On: room air	    trauma work up done;  found to have Left acetabular and T12 compression fx   trauma, ortho and Neurosurgery consulted  later ,pt was cleaerd by trauma, no intervention as per ortho and NS has requested MRI of lumber and thoracic spine  got IV morphine and 1 L NS in ER (2018 06:13)      PAST MEDICAL & SURGICAL HISTORY:  Arthritis  High cholesterol  Hypertension  History of   S/P cholecystectomy      FAMILY HISTORY:  No pertinent family history in first degree relatives      Allergies    No Known Allergies    Intolerances        Home Medications:  aspirin 325 mg oral tablet: 1 tab(s) orally once a day (2018 05:58)  lisinopril-hydroCHLOROthiazide 20 mg-25 mg oral tablet: 1 tab(s) orally once a day (2018 05:58)  Mobic 15 mg oral tablet: 1 tab(s) orally once a day, As Needed (2018 05:58)  Multiple Vitamins oral tablet: 1 tab(s) orally once a day (2018 05:58)  simvastatin 40 mg oral tablet: 1 tab(s) orally once a day (at bedtime) (2018 05:58)  Tylenol 325 mg oral tablet: 1 tab(s) orally prn (2018 05:58)      MEDICATIONS:  Antibiotics:    Neuro:  acetaminophen   Tablet 650 milliGRAM(s) Oral every 6 hours PRN  morphine  - Injectable 2 milliGRAM(s) IV Push five times a day PRN    Anticoagulation:  aspirin  chewable 81 milliGRAM(s) Oral daily  enoxaparin Injectable 40 milliGRAM(s) SubCutaneous at bedtime    OTHER:  docusate sodium 100 milliGRAM(s) Oral two times a day  hydrochlorothiazide 25 milliGRAM(s) Oral daily  lisinopril 20 milliGRAM(s) Oral daily  senna 2 Tablet(s) Oral at bedtime  simvastatin 40 milliGRAM(s) Oral at bedtime    IVF:  multivitamin 1 Tablet(s) Oral daily  sodium chloride 0.9%. 1000 milliLiter(s) IV Continuous <Continuous>      Vital Signs Last 24 Hrs  T(C): 36.4 (2018 07:46), Max: 36.8 (2018 00:39)  T(F): 97.6 (2018 07:46), Max: 98.2 (2018 00:39)  HR: 76 (2018 07:46) (76 - 81)  BP: 151/68 (2018 07:46) (130/63 - 151/68)  BP(mean): --  RR: 18 (2018 07:46) (18 - 18)  SpO2: 99% (2018 07:46) (96% - 99%)    PHYSICAL EXAM:  Constitutional:  Eyes:  face:  Back:  Neurological:  motors    LABS:                        14.8   13.24 )-----------( 218      ( 2018 00:35 )             43.6         132<L>  |  90<L>  |  22<H>  ----------------------------<  181<H>  3.2<L>   |  24  |  0.9    Ca    9.3      2018 00:35    TPro  7.1  /  Alb  4.1  /  TBili  0.6  /  DBili  x   /  AST  37  /  ALT  35  /  AlkPhos  51  07-27    PT/INR - ( 2018 02:06 )   PT: 12.80 sec;   INR: 1.19 ratio         PTT - ( 2018 02:06 )  PTT:25.7 sec      RADIOLOGY & ADDITIONAL STUDIES:  < from: CT Abdomen and Pelvis w/ IV Cont (18 @ 03:01) >  IMPRESSION:   1. Acute, nondisplaced left inferior pubic ramus and left acetabular   column fractures.  2. New severe T12 vertebral body compression fracture with minimal   osseous retropulsion; age indeterminate and possibly acute.  3. Multiple left lower lobe pulmonary nodules, measuring up to 0.9 cm.   Per Fleischner Society 2017 guidelines, follow-up CT chest in 3-6 months   recommended.    < end of copied text >  < from: CT Head No Cont (18 @ 02:57) >    IMPRESSION:    No CT evidence of acute intracranial pathology.      < end of copied text >    < from: CT Cervical Spine No Cont (18 @ 02:57) >  Impression:    No evidence of acute fracture or subluxation of the cervical spine.      < end of copied text >    Plan: LOW GUZMÁN  [id number]    Current Issues:  patient recalls falling onto her back  c/o back pain no leg pain and pelvic area pain .     HPI:  89 F with Hx of HTN, HLD ,arthritis ,transferred from [facility] for trauma work up .   She was complaining of  back and Left lateral thigh pain of 2 day duration after a mechanical fall at a hotel in [facility].  Patient stated  she was waiting for elevator when she tripped and fall backwards, landing on her left posterior hip and back.  Patient denies LOC.  Visited an [facility] where she was prescribed tylenol , [place] came straight to the ED here at [facility] first and than was transferred here at [place] for further work up.    She also  reported  limiting her mobility due to exacerbation of her back pain.  Pain is localized to the left lumbar region and hip.    pt was takings aspirin 325 mg daily ,as per pt it helped with her arthritis and good for heart.  no c/o headache, N/V ,fever and chills ,no c/o urinary or fecal incontinence ,neither any weakness or paresthesias     In ER ;  · Temp (F): 97.8	  · Temp (C) Temp (C): 36.6	  · Temp site Temp Site: oral	  · Heart Rate Heart Rate (beats/min): 76	  · BP Systolic Systolic: 136	  · BP Diastolic Diastolic (mm Hg): 68	  · Respiration Rate (breaths/min) Respiration Rate (breaths/min): 18	  · SpO2 (%) SpO2 (%): 96	  · O2 delivery Patient On: room air	    trauma work up done;  found to have Left acetabular and T12 compression fx   trauma, ortho and Neurosurgery consulted  later ,pt was cleaerd by trauma, no intervention as per ortho and NS has requested MRI of lumber and thoracic spine  got IV morphine and 1 L NS in ER (2018 06:13)      PAST MEDICAL & SURGICAL HISTORY:  Arthritis  High cholesterol  Hypertension  History of   S/P cholecystectomy      FAMILY HISTORY:  No pertinent family history in first degree relatives      Allergies    No Known Allergies    Intolerances        Home Medications:  aspirin 325 mg oral tablet: 1 tab(s) orally once a day (2018 05:58)  lisinopril-hydroCHLOROthiazide 20 mg-25 mg oral tablet: 1 tab(s) orally once a day (2018 05:58)  Mobic 15 mg oral tablet: 1 tab(s) orally once a day, As Needed (2018 05:58)  Multiple Vitamins oral tablet: 1 tab(s) orally once a day (2018 05:58)  simvastatin 40 mg oral tablet: 1 tab(s) orally once a day (at bedtime) (2018 05:58)  Tylenol 325 mg oral tablet: 1 tab(s) orally prn (2018 05:58)      MEDICATIONS:  Antibiotics:    Neuro:  acetaminophen   Tablet 650 milliGRAM(s) Oral every 6 hours PRN  morphine  - Injectable 2 milliGRAM(s) IV Push five times a day PRN    Anticoagulation:  aspirin  chewable 81 milliGRAM(s) Oral daily  enoxaparin Injectable 40 milliGRAM(s) SubCutaneous at bedtime    OTHER:  docusate sodium 100 milliGRAM(s) Oral two times a day  hydrochlorothiazide 25 milliGRAM(s) Oral daily  lisinopril 20 milliGRAM(s) Oral daily  senna 2 Tablet(s) Oral at bedtime  simvastatin 40 milliGRAM(s) Oral at bedtime    IVF:  multivitamin 1 Tablet(s) Oral daily  sodium chloride 0.9%. 1000 milliLiter(s) IV Continuous <Continuous>      Vital Signs Last 24 Hrs  T(C): 36.4 (2018 07:46), Max: 36.8 (2018 00:39)  T(F): 97.6 (2018 07:46), Max: 98.2 (2018 00:39)  HR: 76 (2018 07:46) (76 - 81)  BP: 151/68 (2018 07:46) (130/63 - 151/68)  BP(mean): --  RR: 18 (2018 07:46) (18 - 18)  SpO2: 99% (2018 07:46) (96% - 99%)    PHYSICAL EXAM:  Constitutional: A&Ox3  Back:  tender over t12 on palp  motors  good strength LE    LABS:                        14.8   13.24 )-----------( 218      ( 2018 00:35 )             43.6         132<L>  |  90<L>  |  22<H>  ----------------------------<  181<H>  3.2<L>   |  24  |  0.9    Ca    9.3      2018 00:35    TPro  7.1  /  Alb  4.1  /  TBili  0.6  /  DBili  x   /  AST  37  /  ALT  35  /  AlkPhos  51  07-27    PT/INR - ( 2018 02:06 )   PT: 12.80 sec;   INR: 1.19 ratio         PTT - ( 2018 02:06 )  PTT:25.7 sec      RADIOLOGY & ADDITIONAL STUDIES:  < from: CT Abdomen and Pelvis w/ IV Cont (18 @ 03:01) >  IMPRESSION:   1. Acute, nondisplaced left inferior pubic ramus and left acetabular   column fractures.  2. New severe T12 vertebral body compression fracture with minimal   osseous retropulsion; age indeterminate and possibly acute.  3. Multiple left lower lobe pulmonary nodules, measuring up to 0.9 cm.   Per Fleischner Society 2017 guidelines, follow-up CT chest in 3-6 months   recommended.    < end of copied text >  < from: CT Head No Cont (18 @ 02:57) >    IMPRESSION:    No CT evidence of acute intracranial pathology.      < end of copied text >    < from: CT Cervical Spine No Cont (18 @ 02:57) >  Impression:    No evidence of acute fracture or subluxation of the cervical spine.      < end of copied text >    Plan:  MRI Thoracic and LS spine without to eval for acuity of T12 compression fx.    PT rehab TLSO brace   conservative tx for now if pain does not improve then possible kyphoplasty

## 2018-07-27 NOTE — CONSULT NOTE ADULT - ASSESSMENT
Hypokalemia  Hyponatremia  HTN  fall  pelvic fracture / vertebral fracture    plan:  dc hctz  cont ace-i  limit nsaids  mri spine  f/u bp's  KCl prn  f/u bmp, check mg  will follow

## 2018-07-28 LAB
ALBUMIN SERPL ELPH-MCNC: 3.6 G/DL — SIGNIFICANT CHANGE UP (ref 3.5–5.2)
ALP SERPL-CCNC: 51 U/L — SIGNIFICANT CHANGE UP (ref 30–115)
ALT FLD-CCNC: 40 U/L — SIGNIFICANT CHANGE UP (ref 0–41)
ANION GAP SERPL CALC-SCNC: 14 MMOL/L — SIGNIFICANT CHANGE UP (ref 7–14)
AST SERPL-CCNC: 29 U/L — SIGNIFICANT CHANGE UP (ref 0–41)
BASOPHILS # BLD AUTO: 0.05 K/UL — SIGNIFICANT CHANGE UP (ref 0–0.2)
BASOPHILS NFR BLD AUTO: 0.4 % — SIGNIFICANT CHANGE UP (ref 0–1)
BILIRUB SERPL-MCNC: 0.5 MG/DL — SIGNIFICANT CHANGE UP (ref 0.2–1.2)
BUN SERPL-MCNC: 18 MG/DL — SIGNIFICANT CHANGE UP (ref 10–20)
CALCIUM SERPL-MCNC: 9.1 MG/DL — SIGNIFICANT CHANGE UP (ref 8.5–10.1)
CHLORIDE SERPL-SCNC: 101 MMOL/L — SIGNIFICANT CHANGE UP (ref 98–110)
CHOLEST SERPL-MCNC: 132 MG/DL — SIGNIFICANT CHANGE UP (ref 100–200)
CK MB CFR SERPL CALC: 6.4 NG/ML — HIGH (ref 0.6–6.3)
CK MB CFR SERPL CALC: 6.4 NG/ML — HIGH (ref 0.6–6.3)
CK SERPL-CCNC: 164 U/L — SIGNIFICANT CHANGE UP (ref 0–225)
CO2 SERPL-SCNC: 24 MMOL/L — SIGNIFICANT CHANGE UP (ref 17–32)
CREAT SERPL-MCNC: 0.7 MG/DL — SIGNIFICANT CHANGE UP (ref 0.7–1.5)
EOSINOPHIL # BLD AUTO: 0.22 K/UL — SIGNIFICANT CHANGE UP (ref 0–0.7)
EOSINOPHIL NFR BLD AUTO: 1.8 % — SIGNIFICANT CHANGE UP (ref 0–8)
GLUCOSE SERPL-MCNC: 157 MG/DL — HIGH (ref 70–99)
HCT VFR BLD CALC: 38.5 % — SIGNIFICANT CHANGE UP (ref 37–47)
HDLC SERPL-MCNC: 32 MG/DL — LOW (ref 40–125)
HGB BLD-MCNC: 12.9 G/DL — SIGNIFICANT CHANGE UP (ref 12–16)
IMM GRANULOCYTES NFR BLD AUTO: 0.6 % — HIGH (ref 0.1–0.3)
LIPID PNL WITH DIRECT LDL SERPL: 79 MG/DL — SIGNIFICANT CHANGE UP (ref 4–129)
LYMPHOCYTES # BLD AUTO: 1.97 K/UL — SIGNIFICANT CHANGE UP (ref 1.2–3.4)
LYMPHOCYTES # BLD AUTO: 16 % — LOW (ref 20.5–51.1)
MAGNESIUM SERPL-MCNC: 1.9 MG/DL — SIGNIFICANT CHANGE UP (ref 1.8–2.4)
MCHC RBC-ENTMCNC: 28.9 PG — SIGNIFICANT CHANGE UP (ref 27–31)
MCHC RBC-ENTMCNC: 33.5 G/DL — SIGNIFICANT CHANGE UP (ref 32–37)
MCV RBC AUTO: 86.1 FL — SIGNIFICANT CHANGE UP (ref 81–99)
MONOCYTES # BLD AUTO: 1.26 K/UL — HIGH (ref 0.1–0.6)
MONOCYTES NFR BLD AUTO: 10.2 % — HIGH (ref 1.7–9.3)
NEUTROPHILS # BLD AUTO: 8.73 K/UL — HIGH (ref 1.4–6.5)
NEUTROPHILS NFR BLD AUTO: 71 % — SIGNIFICANT CHANGE UP (ref 42.2–75.2)
PHOSPHATE SERPL-MCNC: 2.2 MG/DL — SIGNIFICANT CHANGE UP (ref 2.1–4.9)
PLATELET # BLD AUTO: 215 K/UL — SIGNIFICANT CHANGE UP (ref 130–400)
POTASSIUM SERPL-MCNC: 3.7 MMOL/L — SIGNIFICANT CHANGE UP (ref 3.5–5)
POTASSIUM SERPL-SCNC: 3.7 MMOL/L — SIGNIFICANT CHANGE UP (ref 3.5–5)
PROT SERPL-MCNC: 6 G/DL — SIGNIFICANT CHANGE UP (ref 6–8)
RBC # BLD: 4.47 M/UL — SIGNIFICANT CHANGE UP (ref 4.2–5.4)
RBC # FLD: 14.1 % — SIGNIFICANT CHANGE UP (ref 11.5–14.5)
SODIUM SERPL-SCNC: 139 MMOL/L — SIGNIFICANT CHANGE UP (ref 135–146)
TOTAL CHOLESTEROL/HDL RATIO MEASUREMENT: 4.1 RATIO — SIGNIFICANT CHANGE UP (ref 4–5.5)
TRIGL SERPL-MCNC: 191 MG/DL — HIGH (ref 10–149)
TROPONIN T SERPL-MCNC: 0.17 NG/ML — CRITICAL HIGH
TROPONIN T SERPL-MCNC: 0.17 NG/ML — CRITICAL HIGH
WBC # BLD: 12.31 K/UL — HIGH (ref 4.8–10.8)
WBC # FLD AUTO: 12.31 K/UL — HIGH (ref 4.8–10.8)

## 2018-07-28 PROCEDURE — 99232 SBSQ HOSP IP/OBS MODERATE 35: CPT

## 2018-07-28 RX ORDER — MAGNESIUM HYDROXIDE 400 MG/1
30 TABLET, CHEWABLE ORAL DAILY
Qty: 0 | Refills: 0 | Status: DISCONTINUED | OUTPATIENT
Start: 2018-07-28 | End: 2018-08-03

## 2018-07-28 RX ADMIN — SIMVASTATIN 40 MILLIGRAM(S): 20 TABLET, FILM COATED ORAL at 22:17

## 2018-07-28 RX ADMIN — MORPHINE SULFATE 2 MILLIGRAM(S): 50 CAPSULE, EXTENDED RELEASE ORAL at 06:07

## 2018-07-28 RX ADMIN — SODIUM CHLORIDE 75 MILLILITER(S): 9 INJECTION INTRAMUSCULAR; INTRAVENOUS; SUBCUTANEOUS at 16:20

## 2018-07-28 RX ADMIN — SENNA PLUS 2 TABLET(S): 8.6 TABLET ORAL at 22:17

## 2018-07-28 RX ADMIN — LISINOPRIL 20 MILLIGRAM(S): 2.5 TABLET ORAL at 05:37

## 2018-07-28 RX ADMIN — Medication 100 MILLIGRAM(S): at 18:07

## 2018-07-28 RX ADMIN — Medication 1 TABLET(S): at 11:15

## 2018-07-28 RX ADMIN — Medication 81 MILLIGRAM(S): at 11:15

## 2018-07-28 RX ADMIN — ENOXAPARIN SODIUM 40 MILLIGRAM(S): 100 INJECTION SUBCUTANEOUS at 22:16

## 2018-07-28 RX ADMIN — MORPHINE SULFATE 2 MILLIGRAM(S): 50 CAPSULE, EXTENDED RELEASE ORAL at 05:37

## 2018-07-28 RX ADMIN — Medication 100 MILLIGRAM(S): at 05:37

## 2018-07-28 RX ADMIN — Medication 650 MILLIGRAM(S): at 14:21

## 2018-07-28 NOTE — PROGRESS NOTE ADULT - ASSESSMENT
Hypokalemia and hyponatremia - better  HTN - bp's controlled  fall  pelvic fracture / vertebral fracture  pulm nodules    plan:    off HCTZ  cont lisinopril  mri spine pending  TLSO brace  PT / Rehab  pain control  LMWH  will follow Hypokalemia and hyponatremia - better  HTN - bp's controlled  fall  pelvic fracture / vertebral fracture  pulm nodules    plan:    off HCTZ  cont lisinopril  mri spine pending  TLSO brace  PT / Rehab  pain control  LMWH  full code  will follow

## 2018-07-28 NOTE — PROGRESS NOTE ADULT - SUBJECTIVE AND OBJECTIVE BOX
NEPHROLOGY FOLLOW UP:    pt seen and examined  still with hip pain  remains on ivf's  electrolytes better    PAST MEDICAL & SURGICAL HISTORY:  Arthritis  High cholesterol  Hypertension  History of   S/P cholecystectomy    Allergies:  No Known Allergies    Home Medications Reviewed    SOCIAL HISTORY:  Denies ETOH,Smoking,   FAMILY HISTORY:  No pertinent family history in first degree relatives    REVIEW OF SYSTEMS:  All other review of systems is negative unless indicated above.    advance care planning and advance care directives reviewed and d/w team in detail    PHYSICAL EXAM:  NAD  awake and alert  dry mm  no jvd  cta bl  rrr  soft, nt  no edema    Hospital Medications:   MEDICATIONS  (STANDING):  aspirin  chewable 81 milliGRAM(s) Oral daily  docusate sodium 100 milliGRAM(s) Oral two times a day  enoxaparin Injectable 40 milliGRAM(s) SubCutaneous at bedtime  lisinopril 20 milliGRAM(s) Oral daily  multivitamin 1 Tablet(s) Oral daily  senna 2 Tablet(s) Oral at bedtime  simvastatin 40 milliGRAM(s) Oral at bedtime  sodium chloride 0.9%. 1000 milliLiter(s) (75 mL/Hr) IV Continuous <Continuous>        VITALS:  T(F): 97.4 (18 @ 13:01), Max: 98.7 (18 @ 06:38)  HR: 77 (18 @ 13:01)  BP: 138/65 (18 @ 13:01)  RR: 18 (18 @ 13:01)  SpO2: 99% (18 @ 16:04)  Wt(kg): --     @ 07:  -   @ 07:00  --------------------------------------------------------  IN: 0 mL / OUT: 0 mL / NET: 0 mL     @ :  -   @ 14:04  --------------------------------------------------------  IN: 0 mL / OUT: 800 mL / NET: -800 mL      Height (cm): 160.02 ( @ 18:10)  Weight (kg): 73 ( @ 18:10)  BMI (kg/m2): 28.5 ( @ 18:10)  BSA (m2): 1.76 ( @ 18:10)    LABS:      139  |  101  |  18  ----------------------------<  157<H>  3.7   |  24  |  0.7    Ca    9.1      2018 10:38  Phos  2.2       Mg     1.9         TPro  6.0  /  Alb  3.6  /  TBili  0.5  /  DBili      /  AST  29  /  ALT  40  /  AlkPhos  51                            12.9   12.31 )-----------( 215      ( 2018 10:38 )             38.5

## 2018-07-28 NOTE — PROGRESS NOTE ADULT - ASSESSMENT
LOW GUZMÁN 89y Female  MRN#: [id number]   CODE STATUS:     SUBJECTIVE  Patient is a 89y old Female who presents with a chief complaint of s/p fall ,transferred fro [facility] for trauma work up  found to have T 12 and pelvic fracture (2018 06:13)    Currently admitted to medicine with the primary diagnosis of Pubic ramus fracture.     Hospital course has been complicated by elevated tropinin.   Today is hospital day 1d, and this morning she is laying in bed comfortably and reports no overnight events.   She has no complaints today except for pain in her left hip and mid back.      Present Today:           Taveras Catheter (x)No/ ()Yes?   Indication:             Central Line (x)No/ ()Yes?   Indication:          IV Fluids (x)No/ ()Yes? Type:  Rate:  Indication:    OBJECTIVE  PAST MEDICAL & SURGICAL HISTORY  Arthritis  High cholesterol  Hypertension  History of   S/P cholecystectomy    ALLERGIES:  No Known Allergies    MEDICATIONS:  STANDING MEDICATIONS  aspirin  chewable 81 milliGRAM(s) Oral daily  docusate sodium 100 milliGRAM(s) Oral two times a day  enoxaparin Injectable 40 milliGRAM(s) SubCutaneous at bedtime  lisinopril 20 milliGRAM(s) Oral daily  multivitamin 1 Tablet(s) Oral daily  senna 2 Tablet(s) Oral at bedtime  simvastatin 40 milliGRAM(s) Oral at bedtime  sodium chloride 0.9%. 1000 milliLiter(s) (75 mL/Hr) IV Continuous <Continuous>    PRN MEDICATIONS  acetaminophen   Tablet 650 milliGRAM(s) Oral every 6 hours PRN  morphine  - Injectable 2 milliGRAM(s) IV Push five times a day PRN      VITAL SIGNS: Last 24 Hours  T(C): 36.3 (2018 13:01), Max: 37.1 (2018 06:38)  T(F): 97.4 (2018 13:01), Max: 98.7 (2018 06:38)  HR: 77 (2018 13:01) (73 - 86)  BP: 138/65 (2018 13:01) (122/77 - 138/65)  BP(mean): --  RR: 18 (2018 13:01) (18 - 20)  SpO2: 99% (2018 16:04) (99% - 99%)    LABS:                        12.9   12.31 )-----------( 215      ( 2018 10:38 )             38.5         139  |  101  |  18  ----------------------------<  157<H>  3.7   |  24  |  0.7    Ca    9.1      2018 10:38  Phos  2.2       Mg     1.9         TPro  6.0  /  Alb  3.6  /  TBili  0.5  /  DBili  x   /  AST  29  /  ALT  40  /  AlkPhos  51      PT/INR - ( 2018 02:06 )   PT: 12.80 sec;   INR: 1.19 ratio         PTT - ( 2018 02:06 )  PTT:25.7 sec      Creatine Kinase, Serum: 164 U/L (18 @ 10:38)  Troponin T, Serum: 0.17 ng/mL <HH> (18 @ 10:38)      CARDIAC MARKERS ( 2018 13:30 )  x     / x     / x     / x     / 6.4 ng/mL  CARDIAC MARKERS ( 2018 10:38 )  x     / 0.17 ng/mL / 164 U/L / x     / x          RADIOLOGY:   < from: CT Abdomen and Pelvis w/ IV Cont (18 @ 03:01) >  IMPRESSION:   1. Acute, nondisplaced left inferior pubic ramus and left acetabular   column fractures.  2. New severe T12 vertebral body compression fracture with minimal   osseous retropulsion; age indeterminate and possibly acute.  3. Multiple left lower lobe pulmonary nodules, measuring up to 0.9 cm.   Per Fleischner Society 2017 guidelines, follow-up CT chest in 3-6 months   recommended.    < end of copied text >  < from: CT Head No Cont (18 @ 02:57) >    IMPRESSION:    No CT evidence of acute intracranial pathology.      < end of copied text >    < from: CT Cervical Spine No Cont (18 @ 02:57) >  Impression:    No evidence of acute fracture or subluxation of the cervical spine.      < end of copied text >      PHYSICAL EXAM:    GENERAL: NAD, well-developed, AAOx3  HEENT:  Atraumatic, Normocephalic. EOMI, PERRLA, conjunctiva and sclera clear, No JVD  PULMONARY: Clear to auscultation bilaterally; No wheeze  CARDIOVASCULAR: Regular rate and rhythm; No murmurs, rubs, or gallops  GASTROINTESTINAL: Soft, Nontender, Nondistended; Bowel sounds present  MUSCULOSKELETAL:  2+ Peripheral Pulses, No clubbing, cyanosis, or edema  NEUROLOGY: non-focal  SKIN: No rashes or lesions    ADMISSION SUMMARY  HPI:  89 F with Hx of HTN, HLD ,arthritis ,transferred from [facility] for trauma work up .   She was complaining of  back and Left lateral thigh pain of 2 day duration after a mechanical fall at a hotel in [facility].  Patient stated  she was waiting for elevator when she tripped and fall backwards, landing on her left posterior hip and back.  Patient denies LOC.  Visited an [facility] where she was prescribed tylenol , [place] she came straight to the ED here at [facility] first and than was transferred here at [place] for further work up.    She also  reported  limiting her mobility due to exacerbation of her back pain.  Pain is localized to the left lumbar region and hip.    pt was takings aspirin 325 mg daily ,as per pt it helped with her arthritis and good for heart.  no c/o headache, N/V ,fever and chills ,no c/o urinary or fecal incontinence ,neither any weakness or paresthesias     In ER ;  · Temp (F): 97.8	  · Temp (C) Temp (C): 36.6	  · Temp site Temp Site: oral	  · Heart Rate Heart Rate (beats/min): 76	  · BP Systolic Systolic: 136	  · BP Diastolic Diastolic (mm Hg): 68	  · Respiration Rate (breaths/min) Respiration Rate (breaths/min): 18	  · SpO2 (%) SpO2 (%): 96	  · O2 delivery Patient On: room air	    trauma work up done;  found to have Left acetabular and T12 compression fx   trauma, ortho and Neurosurgery consulted  later ,pt was cleared by trauma, no intervention as per ortho and NS has requested MRI of lumber and thoracic spine  got IV morphine and 1 L NS in ER (2018 06:13)      ASSESSMENT & PLAN    89 F with Hx of HTN, HLD ,arthritis ,transferred from [facility] for trauma work up  after a mechanical fall 2 days ago ,found to have pelvic and T12 fx.    # s/p mechanical fall;  -L acetabular fx and T 12 fx  -cleared by trauma   -Weight bearing as tolerated as per ortho  -no intervention for pelvic fx as per ortho  -Neuro Surgery ; for T 12  spine fx >> MRI T and L spine recommended  -cw pain control (limit NSAIDS)  -PT/rehab -TLSO brace order in, prescription written, PT did not have brace to do rehab today.     # Troponemia  -no chest pain or shortness of breath  -EKG had no changed from previous  -1st trop 0.17, ck 6.4  -f/u 4:00 PM CE  -f/u echo  -f/u cardio recs      #Incidental pulmonary nodule found on CT  -Pulm: Pt will need a repeat CT chest and follow up with Dr. Donnelly in 6 months    #HTN  -Nephro: D/C HCTZ  cw lisinopril     #CAD/DLD;  -aspirin dose decreased to 81 mg   -cont statin     #Hypokalemia-resolved 3.7   -KCL prn    #DVT ppx; cw lovenox    #Diet; DASH    ( ) Discussion with patient and/or family regarding goals of care  ( ) Discussed Case and Plan with Medical Attending, Name:        # Planned Disposition: ________

## 2018-07-28 NOTE — PROGRESS NOTE ADULT - ASSESSMENT
1 F with Hx of HTN, HLD ,arthritis ,transferred from [facility] for trauma work up  after a mechanical fall 2 days ago ,found to have pelvic and T12 fx.    # s/p mechanical fall;  L acetabular fx and T 12 fx  cleared by trauma   Weight bearing as tolerated as per ortho  no intervention for pelvic fx as per ortho  Neuro Surgery ; for T 12  spine fx >> MRI T and L spine recommended  no weakness or spinal anesthesia ,no urinary or fecal incontinence   cw pain control   PT/rehab after NS recs    #HTN/DLD;  cw lisinopril and HCTZ and statin   aspirin dose decreased to 81 mg     #Hypokalemia;  repleted ,follow 11 am BMP     #DVT ppx; cw lovenox    #Diet; DASH    #activity ; as tolerated  Weight bearing as tolerated as per ortho

## 2018-07-28 NOTE — PROGRESS NOTE ADULT - SUBJECTIVE AND OBJECTIVE BOX
Patient was seen and examined. Spoke with RN. Chart reviewed.    No events overnight.  Vital Signs Last 24 Hrs  T(F): 97.4 (28 Jul 2018 13:01), Max: 98.7 (28 Jul 2018 06:38)  HR: 77 (28 Jul 2018 13:01) (73 - 86)  BP: 138/65 (28 Jul 2018 13:01) (122/77 - 138/65)  SpO2: 99% (27 Jul 2018 16:04) (99% - 99%)  MEDICATIONS  (STANDING):  aspirin  chewable 81 milliGRAM(s) Oral daily  docusate sodium 100 milliGRAM(s) Oral two times a day  enoxaparin Injectable 40 milliGRAM(s) SubCutaneous at bedtime  lisinopril 20 milliGRAM(s) Oral daily  multivitamin 1 Tablet(s) Oral daily  senna 2 Tablet(s) Oral at bedtime  simvastatin 40 milliGRAM(s) Oral at bedtime  sodium chloride 0.9%. 1000 milliLiter(s) (75 mL/Hr) IV Continuous <Continuous>    MEDICATIONS  (PRN):  acetaminophen   Tablet 650 milliGRAM(s) Oral every 6 hours PRN pain  morphine  - Injectable 2 milliGRAM(s) IV Push five times a day PRN Severe Pain (7 - 10)    Labs:                        12.9   12.31 )-----------( 215      ( 28 Jul 2018 10:38 )             38.5                         14.8   13.24 )-----------( 218      ( 27 Jul 2018 00:35 )             43.6     28 Jul 2018 10:38    139    |  101    |  18     ----------------------------<  157    3.7     |  24     |  0.7    27 Jul 2018 00:35    132    |  90     |  22     ----------------------------<  181    3.2     |  24     |  0.9      Ca    9.1        28 Jul 2018 10:38  Ca    9.3        27 Jul 2018 00:35  Phos  2.2       28 Jul 2018 13:30  Mg     1.9       28 Jul 2018 10:38    TPro  6.0    /  Alb  3.6    /  TBili  0.5    /  DBili  x      /  AST  29     /  ALT  40     /  AlkPhos  51     28 Jul 2018 10:38  TPro  7.1    /  Alb  4.1    /  TBili  0.6    /  DBili  x      /  AST  37     /  ALT  35     /  AlkPhos  51     27 Jul 2018 00:35    PT/INR - ( 27 Jul 2018 02:06 )   PT: 12.80 sec;   INR: 1.19 ratio         PTT - ( 27 Jul 2018 02:06 )  PTT:25.7 sec        Radiology:    General: comfortable, NAD  Neurology: A&Ox3, nonfocal  Head:  Normocephalic, atraumatic  ENT:  Mucosa moist, no ulcerations  Neck:  Supple, no JVD,   Skin: no breakdowns (as per RN)  Resp: CTA B/L  CV: RRR, S1S2,   GI: Soft, NT, bowel sounds  MS: No edema, + peripheral pulses, FROM all 4 extremity

## 2018-07-28 NOTE — PROGRESS NOTE ADULT - SUBJECTIVE AND OBJECTIVE BOX
Subjective: Pt with persistent back and pelvic pain    T(C): 36.3 (07-28-18 @ 13:01), Max: 37.1 (07-28-18 @ 06:38)  HR: 77 (07-28-18 @ 13:01) (73 - 86)  BP: 138/65 (07-28-18 @ 13:01) (125/72 - 138/65)  RR: 18 (07-28-18 @ 13:01) (18 - 20)  SpO2: --  Wt(kg): --    Exam: Sitting up in chair moves BLE well.     CBC Full  -  ( 28 Jul 2018 10:38 )  WBC Count : 12.31 K/uL  Hemoglobin : 12.9 g/dL  Hematocrit : 38.5 %  Platelet Count - Automated : 215 K/uL  Mean Cell Volume : 86.1 fL  Mean Cell Hemoglobin : 28.9 pg  Mean Cell Hemoglobin Concentration : 33.5 g/dL  Auto Neutrophil # : 8.73 K/uL  Auto Lymphocyte # : 1.97 K/uL  Auto Monocyte # : 1.26 K/uL  Auto Eosinophil # : 0.22 K/uL  Auto Basophil # : 0.05 K/uL  Auto Neutrophil % : 71.0 %  Auto Lymphocyte % : 16.0 %  Auto Monocyte % : 10.2 %  Auto Eosinophil % : 1.8 %  Auto Basophil % : 0.4 %    07-28    139  |  101  |  18  ----------------------------<  157<H>  3.7   |  24  |  0.7    Ca    9.1      28 Jul 2018 10:38  Phos  2.2     07-28  Mg     1.9     07-28    TPro  6.0  /  Alb  3.6  /  TBili  0.5  /  DBili  x   /  AST  29  /  ALT  40  /  AlkPhos  51  07-28    PT/INR - ( 27 Jul 2018 02:06 )   PT: 12.80 sec;   INR: 1.19 ratio         PTT - ( 27 Jul 2018 02:06 )  PTT:25.7 sec          Assessment/Plan:    Spoke with patient and her daughter about kyphoplasty procedure. All questions answered. At this time patient would like to continue with conservative care with pain management, brace and PT. If no improvement she would consider kyphoplasty. MRI lumbar spine pending to ensure fracture is acute in nature as patient also with confounding pelvic pain.

## 2018-07-28 NOTE — PHYSICAL THERAPY INITIAL EVALUATION ADULT - GENERAL OBSERVATIONS, REHAB EVAL
PT explained reasons for hold and purpose of TLSO. Pt also reviewed PT POC. Pt verbalized understanding and agreement. PT to f/u when appropriate. 1050 - 1101 PT explained reasons for hold and purpose of TLSO. Pt also reviewed PT POC. Pt verbalized understanding and agreement. PT to f/u when appropriate.

## 2018-07-29 LAB
ANION GAP SERPL CALC-SCNC: 11 MMOL/L — SIGNIFICANT CHANGE UP (ref 7–14)
BASOPHILS # BLD AUTO: 0.03 K/UL — SIGNIFICANT CHANGE UP (ref 0–0.2)
BASOPHILS NFR BLD AUTO: 0.4 % — SIGNIFICANT CHANGE UP (ref 0–1)
BUN SERPL-MCNC: 16 MG/DL — SIGNIFICANT CHANGE UP (ref 10–20)
CALCIUM SERPL-MCNC: 8.7 MG/DL — SIGNIFICANT CHANGE UP (ref 8.5–10.1)
CHLORIDE SERPL-SCNC: 104 MMOL/L — SIGNIFICANT CHANGE UP (ref 98–110)
CO2 SERPL-SCNC: 25 MMOL/L — SIGNIFICANT CHANGE UP (ref 17–32)
CREAT SERPL-MCNC: 0.6 MG/DL — LOW (ref 0.7–1.5)
EOSINOPHIL # BLD AUTO: 0.27 K/UL — SIGNIFICANT CHANGE UP (ref 0–0.7)
EOSINOPHIL NFR BLD AUTO: 3.5 % — SIGNIFICANT CHANGE UP (ref 0–8)
GLUCOSE SERPL-MCNC: 139 MG/DL — HIGH (ref 70–99)
HCT VFR BLD CALC: 36.1 % — LOW (ref 37–47)
HGB BLD-MCNC: 12 G/DL — SIGNIFICANT CHANGE UP (ref 12–16)
IMM GRANULOCYTES NFR BLD AUTO: 0.5 % — HIGH (ref 0.1–0.3)
LYMPHOCYTES # BLD AUTO: 1.69 K/UL — SIGNIFICANT CHANGE UP (ref 1.2–3.4)
LYMPHOCYTES # BLD AUTO: 22.2 % — SIGNIFICANT CHANGE UP (ref 20.5–51.1)
MAGNESIUM SERPL-MCNC: 2 MG/DL — SIGNIFICANT CHANGE UP (ref 1.8–2.4)
MCHC RBC-ENTMCNC: 28.8 PG — SIGNIFICANT CHANGE UP (ref 27–31)
MCHC RBC-ENTMCNC: 33.2 G/DL — SIGNIFICANT CHANGE UP (ref 32–37)
MCV RBC AUTO: 86.8 FL — SIGNIFICANT CHANGE UP (ref 81–99)
MONOCYTES # BLD AUTO: 0.85 K/UL — HIGH (ref 0.1–0.6)
MONOCYTES NFR BLD AUTO: 11.2 % — HIGH (ref 1.7–9.3)
NEUTROPHILS # BLD AUTO: 4.73 K/UL — SIGNIFICANT CHANGE UP (ref 1.4–6.5)
NEUTROPHILS NFR BLD AUTO: 62.2 % — SIGNIFICANT CHANGE UP (ref 42.2–75.2)
NRBC # BLD: 0 /100 WBCS — SIGNIFICANT CHANGE UP (ref 0–0)
PHOSPHATE SERPL-MCNC: 2.6 MG/DL — SIGNIFICANT CHANGE UP (ref 2.1–4.9)
PLATELET # BLD AUTO: 164 K/UL — SIGNIFICANT CHANGE UP (ref 130–400)
POTASSIUM SERPL-MCNC: 3.7 MMOL/L — SIGNIFICANT CHANGE UP (ref 3.5–5)
POTASSIUM SERPL-SCNC: 3.7 MMOL/L — SIGNIFICANT CHANGE UP (ref 3.5–5)
RBC # BLD: 4.16 M/UL — LOW (ref 4.2–5.4)
RBC # FLD: 14.3 % — SIGNIFICANT CHANGE UP (ref 11.5–14.5)
SODIUM SERPL-SCNC: 140 MMOL/L — SIGNIFICANT CHANGE UP (ref 135–146)
WBC # BLD: 7.61 K/UL — SIGNIFICANT CHANGE UP (ref 4.8–10.8)
WBC # FLD AUTO: 7.61 K/UL — SIGNIFICANT CHANGE UP (ref 4.8–10.8)

## 2018-07-29 RX ADMIN — SIMVASTATIN 40 MILLIGRAM(S): 20 TABLET, FILM COATED ORAL at 21:18

## 2018-07-29 RX ADMIN — SODIUM CHLORIDE 75 MILLILITER(S): 9 INJECTION INTRAMUSCULAR; INTRAVENOUS; SUBCUTANEOUS at 05:32

## 2018-07-29 RX ADMIN — Medication 650 MILLIGRAM(S): at 08:47

## 2018-07-29 RX ADMIN — SENNA PLUS 2 TABLET(S): 8.6 TABLET ORAL at 21:18

## 2018-07-29 RX ADMIN — Medication 650 MILLIGRAM(S): at 17:21

## 2018-07-29 RX ADMIN — SODIUM CHLORIDE 75 MILLILITER(S): 9 INJECTION INTRAMUSCULAR; INTRAVENOUS; SUBCUTANEOUS at 08:48

## 2018-07-29 RX ADMIN — LISINOPRIL 20 MILLIGRAM(S): 2.5 TABLET ORAL at 05:54

## 2018-07-29 RX ADMIN — Medication 81 MILLIGRAM(S): at 11:35

## 2018-07-29 RX ADMIN — Medication 100 MILLIGRAM(S): at 05:54

## 2018-07-29 RX ADMIN — ENOXAPARIN SODIUM 40 MILLIGRAM(S): 100 INJECTION SUBCUTANEOUS at 21:18

## 2018-07-29 RX ADMIN — Medication 1 TABLET(S): at 11:35

## 2018-07-29 RX ADMIN — Medication 100 MILLIGRAM(S): at 17:19

## 2018-07-29 RX ADMIN — Medication 650 MILLIGRAM(S): at 02:32

## 2018-07-29 NOTE — PROGRESS NOTE ADULT - SUBJECTIVE AND OBJECTIVE BOX
Patient was seen and examined. Spoke with RN. Chart reviewed.  No events overnight.  Vital Signs Last 24 Hrs  T(F): 97.6 (29 Jul 2018 06:07), Max: 98.2 (28 Jul 2018 20:45)  HR: 67 (29 Jul 2018 06:07) (67 - 80)  BP: 124/60 (29 Jul 2018 06:07) (124/60 - 139/67)  SpO2: 96% (28 Jul 2018 20:45) (96% - 96%)  MEDICATIONS  (STANDING):  aspirin  chewable 81 milliGRAM(s) Oral daily  docusate sodium 100 milliGRAM(s) Oral two times a day  enoxaparin Injectable 40 milliGRAM(s) SubCutaneous at bedtime  lisinopril 20 milliGRAM(s) Oral daily  multivitamin 1 Tablet(s) Oral daily  senna 2 Tablet(s) Oral at bedtime  simvastatin 40 milliGRAM(s) Oral at bedtime  sodium chloride 0.9%. 1000 milliLiter(s) (75 mL/Hr) IV Continuous <Continuous>    MEDICATIONS  (PRN):  acetaminophen   Tablet 650 milliGRAM(s) Oral every 6 hours PRN pain  magnesium hydroxide Suspension 30 milliLiter(s) Oral daily PRN Constipation  morphine  - Injectable 2 milliGRAM(s) IV Push five times a day PRN Severe Pain (7 - 10)    Labs:                        12.0   7.61  )-----------( 164      ( 29 Jul 2018 07:28 )             36.1                         12.9   12.31 )-----------( 215      ( 28 Jul 2018 10:38 )             38.5     29 Jul 2018 07:28    140    |  104    |  16     ----------------------------<  139    3.7     |  25     |  0.6    28 Jul 2018 10:38    139    |  101    |  18     ----------------------------<  157    3.7     |  24     |  0.7      Ca    8.7        29 Jul 2018 07:28  Ca    9.1        28 Jul 2018 10:38  Phos  2.6       29 Jul 2018 07:28  Phos  2.2       28 Jul 2018 13:30  Mg     2.0       29 Jul 2018 07:28  Mg     1.9       28 Jul 2018 10:38    TPro  6.0    /  Alb  3.6    /  TBili  0.5    /  DBili  x      /  AST  29     /  ALT  40     /  AlkPhos  51     28 Jul 2018 10:38          General: comfortable, NAD  Neurology:  nonfocal  Head:  Normocephalic, atraumatic  ENT:  Mucosa moist, no ulcerations  Neck:  Supple, no JVD,   Skin: no breakdowns (as per RN)  Resp: CTA B/L  CV: RRR, S1S2,   GI: Soft, NT, bowel sounds  MS: No edema, + peripheral pulses,       A/P:  89 F with Hx of HTN, HLD ,arthritis ,transferred from [facility] for trauma work up  after a mechanical fall 2 days ago ,found to have pelvic and T12 fx.    # s/p mechanical fall;  -L acetabular fx and T 12 fx  -cleared by trauma   -Weight bearing as tolerated as per ortho  -no intervention for pelvic fx as per ortho  -Neuro Surgery ; for T 12  spine fx >> MRI T and L spine recommended  -cw pain control (limit NSAIDS)  -PT/rehab -TLSO brace order in, prescription written, PT did not have brace to do rehab today.     PT /rehab    OOB to chair      #Incidental pulmonary nodule found on CT  -Pulm: Pt will need a repeat CT chest and follow up with Dr. Donnelly in 6 months    #HTN  -Nephro: D/C HCTZ  cw lisinopril     #CAD/DLD;  -aspirin dose decreased to 81 mg   -cont statin   DVT prophylaxis  Decubitus prevention- all measures as per RN protocol  Please call or text me with any questions or updates

## 2018-07-29 NOTE — CONSULT NOTE ADULT - CONSULT REQUESTED DATE/TIME
27-Jul-2018 02:35
27-Jul-2018 03:45
27-Jul-2018 12:21
27-Jul-2018 16:35
27-Jul-2018 17:38
29-Jul-2018 06:57
27-Jul-2018 10:05

## 2018-07-29 NOTE — PHYSICAL THERAPY INITIAL EVALUATION ADULT - GENERAL OBSERVATIONS, REHAB EVAL
13:10-13:20; Pt encountered in bed with RN Stacy. Unable to complete PT IE 2* to awaiting TLSO. Pt educated on importance of OOB, ther ex, progression of PT. Will cont to f/u as appropriate.

## 2018-07-29 NOTE — CONSULT NOTE ADULT - CONSULT REASON
Back pain after mechanical fall 2 days ago
T12 compression fx
electrolye disturbance
elevated troponin
pulmonary nodules
superior, inferior pubic rami fracture
gait dysfunction

## 2018-07-29 NOTE — CONSULT NOTE ADULT - ATTENDING COMMENTS
Pt with improving pain. moving RLE>LLE. Pt has pain at hip/pelvis which is limited proximal movement. Pain to palpation midline thoracolumbar spine. Pt pending MRI T spine. Cont pain management, PT eval, race. If pain improves adequately, no intervention. If cont to be immobilized secondary to pain will offer kyphoplasty. Plan discussed with patient who agrees.
s/p fall with T2 Fracture   NS to evaluate patient   pain control   discharge planning pending NS recommendations
No ACS    - c/w ASA and statin  - no Plavix  - no IV Heparin  - ECHO to assess for RWMA  - d/c tele

## 2018-07-29 NOTE — CONSULT NOTE ADULT - ASSESSMENT
89 yr old female with Hx of HTN, HLD ,arthritis ,transferred from [facility] for trauma work up. She was complaining of  back and Left lateral thigh pain of 2 day duration after a mechanical fall at a hotel in [place]. Pt was found to have left inferior pubic ramus and left acetabular column fractures, new severe T12 vertebral body compression fracture and multiple left lower lobe pulmonary nodules on trauma work up. Cardiology was consulted for elevated troponin on blood work.     Mild elevation of troponin: possible underlying CAD given pt's age and risk factors   HTN/DLD    -Telemetry  -pt denies any chest pain/dyspnea upon exertion.   -ECG showed inferior & anterolateral infarct ( inferior and anteroseptal infarct on last year ECG)  -check TTE  -check lipids and HBA1C  -cont medical therapy with ASA, statin, lisinopril, add metoprolol 89 yr old female with Hx of HTN, HLD ,arthritis ,transferred from [facility] for trauma work up. She was complaining of  back and Left lateral thigh pain of 2 day duration after a mechanical fall at a hotel in [place]. Pt was found to have left inferior pubic ramus and left acetabular column fractures, new severe T12 vertebral body compression fracture and multiple left lower lobe pulmonary nodules on trauma work up. Cardiology was consulted for elevated troponin on blood work.     Mild elevation of troponin: possible underlying CAD given pt's age and risk factors   HTN/DLD    -Telemetry  -pt denies any chest pain/dyspnea upon exertion.   -ECG showed inferior & anterolateral infarct (inferior and anteroseptal infarct on last year ECG)  -check TTE to evaluate for RWMA  -check lipids and HBA1C  -cont medical therapy with ASA, statin, lisinopril, add metoprolol 89 yr old female with Hx of HTN, HLD ,arthritis ,transferred from [facility] for trauma work up. She was complaining of  back and Left lateral thigh pain of 2 day duration after a mechanical fall at a hotel in [place]. Pt was found to have left inferior pubic ramus and left acetabular column fractures, new severe T12 vertebral body compression fracture and multiple left lower lobe pulmonary nodules on trauma work up. Cardiology was consulted for incidental finding of elevated troponin.     Mild elevation of troponin: possible underlying CAD given pt's age and risk factors   HTN  DLD    -telemetry  -pt denies any chest pain/dyspnea upon exertion   -ECG showed inferior & anterolateral infarct (inferior and anteroseptal infarct on last year ECG)  -check TTE to evaluate for RWMA  -check lipids and HBA1C  -cont medical therapy with ASA, statin, lisinopril, add metoprolol

## 2018-07-29 NOTE — PROGRESS NOTE ADULT - ASSESSMENT
Hypokalemia and hyponatremia - better  HTN - bp's controlled  fall  pelvic fracture / vertebral fracture  pulm nodules    plan:    off HCTZ  cont lisinopril  dc ivf  declining kyphoplasty  TLSO brace  PT / Rehab  pain control  LMWH  check echo per cardio

## 2018-07-29 NOTE — CONSULT NOTE ADULT - SUBJECTIVE AND OBJECTIVE BOX
HPI:  89 yr old female with Hx of HTN, HLD ,arthritis ,transferred from [facility] for trauma work up .  She was complaining of  back and Left lateral thigh pain of 2 day duration after a mechanical fall at a hotel in [facility].  Patient stated  she was waiting for elevator when she tripped and fall backwards, landing on her left posterior hip and back.  Patient denies LOC.  Visited an [facility] where she was prescribed tylenol, [place] the [place] came straight to the ED here at [facility] first and than was transferred here at [place] for further work up. She also  reported  limiting her mobility due to exacerbation of her back pain.  Pain is localized to the left lumbar region and hip.    pt was takings aspirin 325 mg daily ,as per pt it helped with her arthritis and good for heart.  no c/o headache, syncope, dizziness N/V ,fever and chills ,no c/o urinary or fecal incontinence ,neither any weakness or paresthesias.  Pt was found to have left inferior pubic ramus and left acetabular column fractures, new severe T12 vertebral body compression fracture and multiple left lower lobe pulmonary nodules on trauma work up.   At baseline, pt denies any chest pain/dyspnea upon exertion and denies any h/o heart disease.       ROS:  All other systems reviewed and are negative    PAST MEDICAL & SURGICAL HISTORY  Arthritis  High cholesterol  Hypertension  History of   S/P cholecystectomy      FAMILY HISTORY:  FAMILY HISTORY:  No pertinent family history in first degree relatives      SOCIAL HISTORY:  [-]smoker  [-]Alcohol  [-]Drug    ALLERGIES:  No Known Allergies      MEDICATIONS:  MEDICATIONS  (STANDING):  aspirin  chewable 81 milliGRAM(s) Oral daily  docusate sodium 100 milliGRAM(s) Oral two times a day  enoxaparin Injectable 40 milliGRAM(s) SubCutaneous at bedtime  lisinopril 20 milliGRAM(s) Oral daily  multivitamin 1 Tablet(s) Oral daily  senna 2 Tablet(s) Oral at bedtime  simvastatin 40 milliGRAM(s) Oral at bedtime  sodium chloride 0.9%. 1000 milliLiter(s) (75 mL/Hr) IV Continuous <Continuous>    MEDICATIONS  (PRN):  acetaminophen   Tablet 650 milliGRAM(s) Oral every 6 hours PRN pain  magnesium hydroxide Suspension 30 milliLiter(s) Oral daily PRN Constipation  morphine  - Injectable 2 milliGRAM(s) IV Push five times a day PRN Severe Pain (7 - 10)      HOME MEDICATIONS:  Home Medications:  aspirin 325 mg oral tablet: 1 tab(s) orally once a day (2018 05:58)  lisinopril-hydroCHLOROthiazide 20 mg-25 mg oral tablet: 1 tab(s) orally once a day (2018 05:58)  Mobic 15 mg oral tablet: 1 tab(s) orally once a day, As Needed (2018 05:58)  Multiple Vitamins oral tablet: 1 tab(s) orally once a day (2018 05:58)  simvastatin 40 mg oral tablet: 1 tab(s) orally once a day (at bedtime) (2018 05:58)  Tylenol 325 mg oral tablet: 1 tab(s) orally prn (2018 05:58)      VITALS:   T(F): 97.6 ( @ 06:07), Max: 98.7 ( @ 06:38)  HR: 67 ( @ 06:07) (67 - 86)  BP: 124/60 ( @ 06:07) (122/77 - 151/68)  BP(mean): --  RR: 16 ( @ 06:07) (16 - 20)  SpO2: 96% ( @ 20:45) (96% - 99%)    I&O's Summary    2018 07:  -  2018 07:00  --------------------------------------------------------  IN: 0 mL / OUT: 0 mL / NET: 0 mL    2018 07:  -  2018 06:57  --------------------------------------------------------  IN: 1650 mL / OUT: 1600 mL / NET: 50 mL        PHYSICAL EXAM:  GEN: Alert and oriented X 3, No acute distress  HEENT: no pallor  NECK: Supple, no JVD  LUNGS: Clear to auscultation bilaterally  CARDIOVASCULAR: S1/S2 regular, no murmurs or rubs  ABD: Soft, BS+  EXT: No Lower extremity edema/cyanosis, +left hip ROM limited due to pain.   NEURO: Non focal  SKIN: Intact    LABS:                        12.9   12.31 )-----------( 215      ( 2018 10:38 )             38.5         139  |  101  |  18  ----------------------------<  157<H>  3.7   |  24  |  0.7    Ca    9.1      2018 10:38  Phos  2.2       Mg     1.9         TPro  6.0  /  Alb  3.6  /  TBili  0.5  /  DBili  x   /  AST  29  /  ALT  40  /  AlkPhos  51        Troponin T, Serum: 0.17 ng/mL <HH> (18 @ 17:50)  Creatine Kinase, Serum: 164 U/L (18 @ 10:38)  Troponin T, Serum: 0.17 ng/mL <HH> (18 @ 10:38)    CARDIAC MARKERS ( 2018 17:50 )  x     / 0.17 ng/mL / x     / x     / 6.4 ng/mL  CARDIAC MARKERS ( 2018 13:30 )  x     / x     / x     / x     / 6.4 ng/mL  CARDIAC MARKERS ( 2018 10:38 )  x     / 0.17 ng/mL / 164 U/L / x     / x            Troponin trend:       Chol 132 LDL 79 HDL 32<L> Trig 191<H>  Hemoglobin A1C   Thyroid      RADIOLOGY:  -CXR:  < from: Xray Chest 1 View- PORTABLE-Urgent (18 @ 02:39) >    Impression:      No radiographic evidence of acute cardiopulmonary disease.    < end of copied text >    -CT:  < from: CT Chest w/ IV Cont (18 @ 03:01) >  IMPRESSION:   1. Acute, nondisplaced left inferior pubic ramus and left acetabular   column fractures.  2. New severe T12 vertebral body compression fracture with minimal   osseous retropulsion; age indeterminate and possibly acute.  3. Multiple left lower lobe pulmonary nodules, measuring up to 0.9 cm.   Per Fleischner Society 2017 guidelines, follow-up CT chest in 3-6 months   recommended.    < end of copied text >    < from: CT Head No Cont (18 @ 02:57) >    IMPRESSION:    No CT evidence of acute intracranial pathology.    < end of copied text >    < from: CT Cervical Spine No Cont (18 @ 02:57) >  Impression:    No evidence of acute fracture or subluxation of the cervical spine.    < end of copied text >    < from: Xray Hip 2-3 Views, Left (18 @ 22:57) >  Impression:  Evidence of acute, left inferior and superior pubic rami fractures    < end of copied text >    -TTE:  -CCTA:  -STRESS TEST:  -CATHETERIZATION:    ECG:  SR, 1st degree AV block, inferior infarct, anterolateral infarct.   TELEMETRY EVENTS: HPI:  89 yr old female with Hx of HTN, HLD ,arthritis ,transferred from [facility] for trauma work up  She was complaining of  back and Left lateral thigh pain of 2 day duration after a mechanical fall at a hotel in [facility].  Patient stated she was waiting for elevator when she tripped and fall backwards, landing on her left posterior hip and back.  Patient denies LOC.  Visited an [facility] where she was prescribed tylenol, [place] came straight to the ED here at [facility] first and than was transferred here at [place] for further work up. She also  reported  limiting her mobility due to exacerbation of her back pain.  Pain is localized to the left lumbar region and hip.  pt was takings aspirin 325 mg daily ,as per pt it helped with her arthritis and good for heart.  no c/o headache, syncope, dizziness N/V ,fever and chills ,no c/o urinary or fecal incontinence ,neither any weakness or paresthesias.  Pt was found to have left inferior pubic ramus and left acetabular column fractures, new severe T12 vertebral body compression fracture and multiple left lower lobe pulmonary nodules on trauma work up  At baseline, pt denies any chest pain/dyspnea upon exertion and denies any h/o heart disease.     ROS:  All other systems reviewed and are negative    PAST MEDICAL & SURGICAL HISTORY  Arthritis  High cholesterol  Hypertension  History of   S/P cholecystectomy      FAMILY HISTORY:  FAMILY HISTORY:  No pertinent family history in first degree relatives      SOCIAL HISTORY:  [-]smoker  [-]Alcohol  [-]Drug    ALLERGIES:  No Known Allergies      MEDICATIONS:  MEDICATIONS  (STANDING):  aspirin  chewable 81 milliGRAM(s) Oral daily  docusate sodium 100 milliGRAM(s) Oral two times a day  enoxaparin Injectable 40 milliGRAM(s) SubCutaneous at bedtime  lisinopril 20 milliGRAM(s) Oral daily  multivitamin 1 Tablet(s) Oral daily  senna 2 Tablet(s) Oral at bedtime  simvastatin 40 milliGRAM(s) Oral at bedtime  sodium chloride 0.9%. 1000 milliLiter(s) (75 mL/Hr) IV Continuous <Continuous>    MEDICATIONS  (PRN):  acetaminophen   Tablet 650 milliGRAM(s) Oral every 6 hours PRN pain  magnesium hydroxide Suspension 30 milliLiter(s) Oral daily PRN Constipation  morphine  - Injectable 2 milliGRAM(s) IV Push five times a day PRN Severe Pain (7 - 10)      HOME MEDICATIONS:  Home Medications:  aspirin 325 mg oral tablet: 1 tab(s) orally once a day (2018 05:58)  lisinopril-hydroCHLOROthiazide 20 mg-25 mg oral tablet: 1 tab(s) orally once a day (2018 05:58)  Mobic 15 mg oral tablet: 1 tab(s) orally once a day, As Needed (2018 05:58)  Multiple Vitamins oral tablet: 1 tab(s) orally once a day (2018 05:58)  simvastatin 40 mg oral tablet: 1 tab(s) orally once a day (at bedtime) (2018 05:58)  Tylenol 325 mg oral tablet: 1 tab(s) orally prn (2018 05:58)      VITALS:   T(F): 97.6 ( @ 06:07), Max: 98.7 ( @ 06:38)  HR: 67 ( @ 06:07) (67 - 86)  BP: 124/60 ( @ 06:07) (122/77 - 151/68)  BP(mean): --  RR: 16 ( @ 06:07) (16 - 20)  SpO2: 96% ( @ 20:45) (96% - 99%)    I&O's Summary    2018 07:  -  2018 07:00  --------------------------------------------------------  IN: 0 mL / OUT: 0 mL / NET: 0 mL    2018 07:  -  2018 06:57  --------------------------------------------------------  IN: 1650 mL / OUT: 1600 mL / NET: 50 mL        PHYSICAL EXAM:  GEN: Alert and oriented X 3, No acute distress  HEENT: no pallor  NECK: Supple, no JVD  LUNGS: Clear to auscultation bilaterally  CARDIOVASCULAR: S1/S2 regular, no murmurs or rubs  ABD: Soft, BS+  EXT: No Lower extremity edema/cyanosis, +left hip ROM limited due to pain.   NEURO: Non focal  SKIN: Intact    LABS:                        12.9   12.31 )-----------( 215      ( 2018 10:38 )             38.5         139  |  101  |  18  ----------------------------<  157<H>  3.7   |  24  |  0.7    Ca    9.1      2018 10:38  Phos  2.2       Mg     1.9         TPro  6.0  /  Alb  3.6  /  TBili  0.5  /  DBili  x   /  AST  29  /  ALT  40  /  AlkPhos  51        Troponin T, Serum: 0.17 ng/mL <HH> (18 @ 17:50)  Creatine Kinase, Serum: 164 U/L (18 @ 10:38)  Troponin T, Serum: 0.17 ng/mL <HH> (18 @ 10:38)    CARDIAC MARKERS ( 2018 17:50 )  x     / 0.17 ng/mL / x     / x     / 6.4 ng/mL  CARDIAC MARKERS ( 2018 13:30 )  x     / x     / x     / x     / 6.4 ng/mL  CARDIAC MARKERS ( 2018 10:38 )  x     / 0.17 ng/mL / 164 U/L / x     / x            Troponin trend:       Chol 132 LDL 79 HDL 32<L> Trig 191<H>  Hemoglobin A1C   Thyroid      RADIOLOGY:  -CXR:  < from: Xray Chest 1 View- PORTABLE-Urgent (18 @ 02:39) >    Impression:      No radiographic evidence of acute cardiopulmonary disease.    < end of copied text >      -CT:  < from: CT Chest w/ IV Cont (18 @ 03:01) >  IMPRESSION:   1. Acute, nondisplaced left inferior pubic ramus and left acetabular   column fractures.  2. New severe T12 vertebral body compression fracture with minimal   osseous retropulsion; age indeterminate and possibly acute.  3. Multiple left lower lobe pulmonary nodules, measuring up to 0.9 cm.   Per Fleischner Society 2017 guidelines, follow-up CT chest in 3-6 months   recommended.    < end of copied text >        < from: CT Head No Cont (18 @ 02:57) >  IMPRESSION:    No CT evidence of acute intracranial pathology.    < end of copied text >        < from: CT Cervical Spine No Cont (18 @ 02:57) >  Impression:    No evidence of acute fracture or subluxation of the cervical spine.    < end of copied text >    < from: Xray Hip 2-3 Views, Left (18 @ 22:57) >  Impression:  Evidence of acute, left inferior and superior pubic rami fractures    < end of copied text >      ECG:  SR, 1st degree AV block, inferior infarct, anterolateral infarct HPI:  89 yo female with Hx of HTN, HLD, arthritis transferred from [facility] for trauma work up  She was complaining of  back and Left lateral thigh pain of 2 day duration after a mechanical fall at a hotel in [facility].  Patient stated she was waiting for elevator when she tripped and fall backwards, landing on her left posterior hip and back. Patient denies LOC. Visited a hospital in [facility] where she was prescribed tylenol, [place] the [place] came straight to the ED here at [facility] first and than was transferred here at [place] for further work up. She also reported limiting her mobility due to exacerbation of her back pain. Pain is localized to the left lumbar region and hip.  pt was takings aspirin 325 mg daily ,as per pt it helped with her arthritis and good for heart.  no c/o headache, syncope, dizziness, N/V ,fever and chills ,no c/o urinary or fecal incontinence ,neither any weakness or paresthesias.  Pt was found to have left inferior pubic ramus and left acetabular column fractures, new severe T12 vertebral body compression fracture and multiple left lower lobe pulmonary nodules on trauma work up  At baseline, pt denies any chest pain/dyspnea upon exertion and denies any h/o heart disease.     ROS:  All other systems reviewed and are negative    PAST MEDICAL & SURGICAL HISTORY  Arthritis  High cholesterol  Hypertension  History of   S/P cholecystectomy      FAMILY HISTORY:  FAMILY HISTORY:  No pertinent family history in first degree relatives      SOCIAL HISTORY:  [-]smoker  [-]Alcohol  [-]Drug    ALLERGIES:  No Known Allergies      MEDICATIONS:  MEDICATIONS  (STANDING):  aspirin  chewable 81 milliGRAM(s) Oral daily  docusate sodium 100 milliGRAM(s) Oral two times a day  enoxaparin Injectable 40 milliGRAM(s) SubCutaneous at bedtime  lisinopril 20 milliGRAM(s) Oral daily  multivitamin 1 Tablet(s) Oral daily  senna 2 Tablet(s) Oral at bedtime  simvastatin 40 milliGRAM(s) Oral at bedtime  sodium chloride 0.9%. 1000 milliLiter(s) (75 mL/Hr) IV Continuous <Continuous>    MEDICATIONS  (PRN):  acetaminophen   Tablet 650 milliGRAM(s) Oral every 6 hours PRN pain  magnesium hydroxide Suspension 30 milliLiter(s) Oral daily PRN Constipation  morphine  - Injectable 2 milliGRAM(s) IV Push five times a day PRN Severe Pain (7 - 10)      HOME MEDICATIONS:  Home Medications:  aspirin 325 mg oral tablet: 1 tab(s) orally once a day (2018 05:58)  lisinopril-hydroCHLOROthiazide 20 mg-25 mg oral tablet: 1 tab(s) orally once a day (2018 05:58)  Mobic 15 mg oral tablet: 1 tab(s) orally once a day, As Needed (2018 05:58)  Multiple Vitamins oral tablet: 1 tab(s) orally once a day (2018 05:58)  simvastatin 40 mg oral tablet: 1 tab(s) orally once a day (at bedtime) (2018 05:58)  Tylenol 325 mg oral tablet: 1 tab(s) orally prn (2018 05:58)      VITALS:   T(F): 97.6 ( @ 06:07), Max: 98.7 ( @ 06:38)  HR: 67 ( @ 06:07) (67 - 86)  BP: 124/60 ( @ 06:07) (122/77 - 151/68)  BP(mean): --  RR: 16 ( @ 06:07) (16 - 20)  SpO2: 96% ( @ 20:45) (96% - 99%)    I&O's Summary    2018 07:  -  2018 07:00  --------------------------------------------------------  IN: 0 mL / OUT: 0 mL / NET: 0 mL    2018 07:  -  2018 06:57  --------------------------------------------------------  IN: 1650 mL / OUT: 1600 mL / NET: 50 mL        PHYSICAL EXAM:  GEN: NAD, AAOx3  HEENT: NCAT, EOMI  NECK: Supple, no JVD  LUNGS: Clear to auscultation bilaterally, no wheeze  CARDIOVASCULAR: S1/S2 regular, no murmurs or rubs  ABD: Soft, NT/ND, BS+  EXT: no lower extremity edema/cyanosis, +left hip ROM limited due to pain.   NEURO: Nonfocal  SKIN: Intact    LABS:                        12.9   12.31 )-----------( 215      ( 2018 10:38 )             38.5         139  |  101  |  18  ----------------------------<  157<H>  3.7   |  24  |  0.7    Ca    9.1      2018 10:38  Phos  2.2       Mg     1.9         TPro  6.0  /  Alb  3.6  /  TBili  0.5  /  DBili  x   /  AST  29  /  ALT  40  /  AlkPhos  51        Troponin T, Serum: 0.17 ng/mL <HH> (18 @ 17:50)  Creatine Kinase, Serum: 164 U/L (18 @ 10:38)  Troponin T, Serum: 0.17 ng/mL <HH> (18 @ 10:38)      RADIOLOGY:  -CXR:  < from: Xray Chest 1 View- PORTABLE-Urgent (18 @ 02:39) >  Impression:      No radiographic evidence of acute cardiopulmonary disease.    < end of copied text >      -CT:  < from: CT Chest w/ IV Cont (18 @ 03:01) >  IMPRESSION:   1. Acute, nondisplaced left inferior pubic ramus and left acetabular   column fractures.  2. New severe T12 vertebral body compression fracture with minimal   osseous retropulsion; age indeterminate and possibly acute.  3. Multiple left lower lobe pulmonary nodules, measuring up to 0.9 cm.   Per Fleischner Society 2017 guidelines, follow-up CT chest in 3-6 months   recommended.    < end of copied text >        < from: CT Head No Cont (18 @ 02:57) >  IMPRESSION:    No CT evidence of acute intracranial pathology.    < end of copied text >        < from: CT Cervical Spine No Cont (18 @ 02:57) >  Impression:    No evidence of acute fracture or subluxation of the cervical spine.    < end of copied text >    < from: Xray Hip 2-3 Views, Left (18 @ 22:57) >  Impression:  Evidence of acute, left inferior and superior pubic rami fractures    < end of copied text >      ECG:  SR, 1st degree AV block, inferior infarct, anterolateral infarct

## 2018-07-29 NOTE — PHYSICAL THERAPY INITIAL EVALUATION ADULT - SPECIFY REASON(S)
Pt currently not in room. Will cont to f/u as appropriate. Unaware if TLSO was delivered yet.
Pt with pubic rami fx and acetabular fx; As per NSG, recommend TLSO for severe T12 vertebral compression fx. Spoke with pt and visitor in room; TLSO has been ordered however has not arrived yet.
Pt still waiting for TLSO for participation with PT. RAGHAV villegas, present in room during PT.

## 2018-07-29 NOTE — PROGRESS NOTE ADULT - SUBJECTIVE AND OBJECTIVE BOX
NEPHROLOGY FOLLOW UP:    pt seen and examined  same complaints  waiting for [employer]  bp's fair  still on ivf's    PAST MEDICAL & SURGICAL HISTORY:  Arthritis  High cholesterol  Hypertension  History of   S/P cholecystectomy    Allergies:  No Known Allergies    Home Medications Reviewed    SOCIAL HISTORY:  Denies ETOH,Smoking,   FAMILY HISTORY:  No pertinent family history in first degree relatives    REVIEW OF SYSTEMS:  All other review of systems is negative unless indicated above.    PHYSICAL EXAM:  NAD  awake and alert  dry mm  no jvd  cta bl  rrr  soft, nt  no edema    Hospital Medications:   MEDICATIONS  (STANDING):  aspirin  chewable 81 milliGRAM(s) Oral daily  docusate sodium 100 milliGRAM(s) Oral two times a day  enoxaparin Injectable 40 milliGRAM(s) SubCutaneous at bedtime  lisinopril 20 milliGRAM(s) Oral daily  multivitamin 1 Tablet(s) Oral daily  senna 2 Tablet(s) Oral at bedtime  simvastatin 40 milliGRAM(s) Oral at bedtime  sodium chloride 0.9%. 1000 milliLiter(s) (75 mL/Hr) IV Continuous <Continuous>    VITALS:  T(F): 97.6 (18 @ 06:07), Max: 98.2 (18 @ 20:45)  HR: 67 (18 @ 06:07)  BP: 124/60 (18 @ 06:07)  RR: 16 (18 @ 06:07)  SpO2: 96% (18 @ 20:45)  Wt(kg): --     @ :  -   @ 07:00  --------------------------------------------------------  IN: 0 mL / OUT: 0 mL / NET: 0 mL     @ :  -   @ 07:00  --------------------------------------------------------  IN: 1650 mL / OUT: 1950 mL / NET: -300 mL      Height (cm): 157.48 ( @ 16:00)  Weight (kg): 73.7 ( @ 16:00)  BMI (kg/m2): 29.7 ( @ 16:00)  BSA (m2): 1.75 ( @ 16:00)    LABS:      140  |  104  |  16  ----------------------------<  139<H>  3.7   |  25  |  0.6<L>    Ca    8.7      2018 07:28  Phos  2.6       Mg     2.0         TPro  6.0  /  Alb  3.6  /  TBili  0.5  /  DBili      /  AST  29  /  ALT  40  /  AlkPhos  51                            12.0   7.61  )-----------( 164      ( 2018 07:28 )             36.1       Urine Studies:        RADIOLOGY & ADDITIONAL STUDIES:

## 2018-07-30 LAB
ANION GAP SERPL CALC-SCNC: 13 MMOL/L — SIGNIFICANT CHANGE UP (ref 7–14)
BASOPHILS # BLD AUTO: 0.03 K/UL — SIGNIFICANT CHANGE UP (ref 0–0.2)
BASOPHILS NFR BLD AUTO: 0.3 % — SIGNIFICANT CHANGE UP (ref 0–1)
BUN SERPL-MCNC: 13 MG/DL — SIGNIFICANT CHANGE UP (ref 10–20)
CALCIUM SERPL-MCNC: 9.2 MG/DL — SIGNIFICANT CHANGE UP (ref 8.5–10.1)
CHLORIDE SERPL-SCNC: 102 MMOL/L — SIGNIFICANT CHANGE UP (ref 98–110)
CO2 SERPL-SCNC: 25 MMOL/L — SIGNIFICANT CHANGE UP (ref 17–32)
CREAT SERPL-MCNC: 0.6 MG/DL — LOW (ref 0.7–1.5)
EOSINOPHIL # BLD AUTO: 0.16 K/UL — SIGNIFICANT CHANGE UP (ref 0–0.7)
EOSINOPHIL NFR BLD AUTO: 1.9 % — SIGNIFICANT CHANGE UP (ref 0–8)
GLUCOSE SERPL-MCNC: 134 MG/DL — HIGH (ref 70–99)
HCT VFR BLD CALC: 38.5 % — SIGNIFICANT CHANGE UP (ref 37–47)
HGB BLD-MCNC: 12.7 G/DL — SIGNIFICANT CHANGE UP (ref 12–16)
IMM GRANULOCYTES NFR BLD AUTO: 0.6 % — HIGH (ref 0.1–0.3)
LYMPHOCYTES # BLD AUTO: 2.01 K/UL — SIGNIFICANT CHANGE UP (ref 1.2–3.4)
LYMPHOCYTES # BLD AUTO: 23.3 % — SIGNIFICANT CHANGE UP (ref 20.5–51.1)
MCHC RBC-ENTMCNC: 28.5 PG — SIGNIFICANT CHANGE UP (ref 27–31)
MCHC RBC-ENTMCNC: 33 G/DL — SIGNIFICANT CHANGE UP (ref 32–37)
MCV RBC AUTO: 86.5 FL — SIGNIFICANT CHANGE UP (ref 81–99)
MONOCYTES # BLD AUTO: 0.94 K/UL — HIGH (ref 0.1–0.6)
MONOCYTES NFR BLD AUTO: 10.9 % — HIGH (ref 1.7–9.3)
NEUTROPHILS # BLD AUTO: 5.42 K/UL — SIGNIFICANT CHANGE UP (ref 1.4–6.5)
NEUTROPHILS NFR BLD AUTO: 63 % — SIGNIFICANT CHANGE UP (ref 42.2–75.2)
NRBC # BLD: 0 /100 WBCS — SIGNIFICANT CHANGE UP (ref 0–0)
PLATELET # BLD AUTO: 199 K/UL — SIGNIFICANT CHANGE UP (ref 130–400)
POTASSIUM SERPL-MCNC: 4 MMOL/L — SIGNIFICANT CHANGE UP (ref 3.5–5)
POTASSIUM SERPL-SCNC: 4 MMOL/L — SIGNIFICANT CHANGE UP (ref 3.5–5)
RBC # BLD: 4.45 M/UL — SIGNIFICANT CHANGE UP (ref 4.2–5.4)
RBC # FLD: 14 % — SIGNIFICANT CHANGE UP (ref 11.5–14.5)
SODIUM SERPL-SCNC: 140 MMOL/L — SIGNIFICANT CHANGE UP (ref 135–146)
WBC # BLD: 8.61 K/UL — SIGNIFICANT CHANGE UP (ref 4.8–10.8)
WBC # FLD AUTO: 8.61 K/UL — SIGNIFICANT CHANGE UP (ref 4.8–10.8)

## 2018-07-30 RX ORDER — CARVEDILOL PHOSPHATE 80 MG/1
3.12 CAPSULE, EXTENDED RELEASE ORAL EVERY 12 HOURS
Qty: 0 | Refills: 0 | Status: DISCONTINUED | OUTPATIENT
Start: 2018-07-30 | End: 2018-08-03

## 2018-07-30 RX ADMIN — Medication 650 MILLIGRAM(S): at 21:42

## 2018-07-30 RX ADMIN — Medication 1 TABLET(S): at 12:45

## 2018-07-30 RX ADMIN — CARVEDILOL PHOSPHATE 3.12 MILLIGRAM(S): 80 CAPSULE, EXTENDED RELEASE ORAL at 17:38

## 2018-07-30 RX ADMIN — Medication 100 MILLIGRAM(S): at 05:36

## 2018-07-30 RX ADMIN — LISINOPRIL 20 MILLIGRAM(S): 2.5 TABLET ORAL at 05:36

## 2018-07-30 RX ADMIN — Medication 650 MILLIGRAM(S): at 12:45

## 2018-07-30 RX ADMIN — Medication 650 MILLIGRAM(S): at 05:41

## 2018-07-30 RX ADMIN — Medication 81 MILLIGRAM(S): at 12:45

## 2018-07-30 RX ADMIN — ENOXAPARIN SODIUM 40 MILLIGRAM(S): 100 INJECTION SUBCUTANEOUS at 21:44

## 2018-07-30 RX ADMIN — SENNA PLUS 2 TABLET(S): 8.6 TABLET ORAL at 21:44

## 2018-07-30 RX ADMIN — SIMVASTATIN 40 MILLIGRAM(S): 20 TABLET, FILM COATED ORAL at 21:44

## 2018-07-30 RX ADMIN — Medication 100 MILLIGRAM(S): at 17:37

## 2018-07-30 NOTE — PROGRESS NOTE ADULT - ASSESSMENT
/P:  89 F with Hx of HTN, HLD ,arthritis ,transferred from [facility] for trauma work up  after a mechanical fall 2 days ago ,found to have pelvic and T12 fx.    # s/p mechanical fall;  -L acetabular fx and T 12 fx  -cleared by trauma   -Weight bearing as tolerated as per ortho  -no intervention for pelvic fx as per ortho  -Neuro Surgery ; for T 12  spine fx >> MRI T and L spine recommended  -cw pain control (limit NSAIDS)  -PT/rehab -TLSO brace order in, prescription written, PT did not have brace to do rehab today.     PT /rehab to [facility] or inpatient rehab once she has tlso brace    OOB to chair      #Incidental pulmonary nodule found on CT  -Pulm: Pt will need a repeat CT chest and follow up with Dr. Donnelly in 6 months    #HTN  -Nephro: D/C HCTZ

## 2018-07-30 NOTE — MEDICAL STUDENT PROGRESS NOTE(EDUCATION) - NS MD HP STUD ASPLAN ASSES FT
89 y/o F PMH of HTN, HLD, arthritis was transferred from [facility] for trauma work up s/p mechanical fall w/ CC of back and L lateral thigh pain of 2d duration, found to have L acetabular and L inferior pubic ramis fracture and T12 compression fracture on CT.

## 2018-07-30 NOTE — PROGRESS NOTE ADULT - ASSESSMENT
resolved electrolyte derangements  HTN   fall  pelvic fracture / vertebral fracture  pulm nodules  EF 30%    plan:    off HCTZ  cont lisinopril  can add low dose betablocker due to lv dysfunction - coreg 6.125mg po bid  declining kyphoplasty  awaiting TLSO brace  PT / Rehab  pain control  LMWH  f/u echo results with cardiology

## 2018-07-30 NOTE — PROGRESS NOTE ADULT - ASSESSMENT
ECHO reviewed - findings consistent with AWMI and ischemic cardiomyopathy EF 30%    NPO after midnight for cath tomorrow - Dr. Ventura Ochoa

## 2018-07-30 NOTE — MEDICAL STUDENT PROGRESS NOTE(EDUCATION) - NS MD HP STUD ASPLAN PLAN FT
# s/p mechanical fall;  -L acetabular fx and T 12 fx  -cleared by trauma   -Weight bearing as tolerated as per ortho  -no intervention for pelvic fx as per ortho  -Neuro Surgery c/s for T12 spine fx = MRI T and L spine completed.    1. T12 vertebral body moderate acute compression fracture with mild         retropulsion into the spinal canal and mild spinal canal stenosis. No         cord compression. No additional compression fractures or areas of bone         marrow edema.    2. Indeterminant subcentimeter lesion seen in the T10 vertebral body was         noted on thoracic spine MRI. Recommend bone scan to evaluate for         additional osseous lesions.  - as per NS c/s, if patient wants to have surgery (kyphoplasty) then f/u NS.  -cw pain control (limit NSAIDS)  -PT/rehab - OOB to chair. TLSO brace still pending. PT did not have brace to do rehab yesterday (7/29). Will f/u PT.    # elevated troponin (0.17) and CKMB (6.4)  - no c/o of chest pain or SOB  - EKG = inferior and anterolateral infarct (unchanged from last year)  - TTE ordered as per cardio c/s to evaluate for RWMA. Results pending. Will f/u cardio recs.  - f/u lipid + HbA1c  - c/w statin, lisinopril, metoprolol. ASA d/c'd as per IM attending note.    #Incidental pulmonary nodule found on CT  -Pulm: Pt will need a repeat CT chest and follow up with Dr. Donnelly in 6 months    #HTN  -Nephro: D/C HCTZ  cw lisinopril     #CAD/DLD;  -aspirin dose 81 mg   -cont statin     DVT prophylaxis  Decubitus prevention- all measures as per RN protocol

## 2018-07-30 NOTE — PROGRESS NOTE ADULT - SUBJECTIVE AND OBJECTIVE BOX
LOW GUZMÁN 89y Female  MRN#: [id number]   CODE STATUS:________      SUBJECTIVE  Patient is a 89y old Female who presents with a chief complaint of s/p fall ,transferred fro [facility] for trauma work up  found to have T 12 and pelvic fracture (2018 06:13)    she is currently admitted to medicine with the primary diagnosis of Pubic ramus fracture    Today is hospital day 3d, and this morning she complains of having a sore back. She has not gotten OOB since and is still currently waiting for her TLSO brace to arrive to begin PT rehab.    No acute overnight events.     OBJECTIVE  PAST MEDICAL & SURGICAL HISTORY  Arthritis  High cholesterol  Hypertension  History of   S/P cholecystectomy    ALLERGIES:  No Known Allergies    MEDICATIONS:  STANDING MEDICATIONS  aspirin  chewable 81 milliGRAM(s) Oral daily  docusate sodium 100 milliGRAM(s) Oral two times a day  enoxaparin Injectable 40 milliGRAM(s) SubCutaneous at bedtime  lisinopril 20 milliGRAM(s) Oral daily  multivitamin 1 Tablet(s) Oral daily  senna 2 Tablet(s) Oral at bedtime  simvastatin 40 milliGRAM(s) Oral at bedtime    PRN MEDICATIONS  acetaminophen   Tablet 650 milliGRAM(s) Oral every 6 hours PRN  magnesium hydroxide Suspension 30 milliLiter(s) Oral daily PRN  morphine  - Injectable 2 milliGRAM(s) IV Push five times a day PRN    HOME MEDICATIONS  Home Medications:  aspirin 325 mg oral tablet: 1 tab(s) orally once a day (2018 05:58)  lisinopril-hydroCHLOROthiazide 20 mg-25 mg oral tablet: 1 tab(s) orally once a day (2018 05:58)  Mobic 15 mg oral tablet: 1 tab(s) orally once a day, As Needed (2018 05:58)  Multiple Vitamins oral tablet: 1 tab(s) orally once a day (2018 05:58)  simvastatin 40 mg oral tablet: 1 tab(s) orally once a day (at bedtime) (2018 05:58)  Tylenol 325 mg oral tablet: 1 tab(s) orally prn (2018 05:58)      VITAL SIGNS: Last 24 Hours  T(C): 36.2 (2018 12:38), Max: 36.9 (2018 15:13)  T(F): 97.1 (2018 12:38), Max: 98.5 (2018 15:13)  HR: 83 (2018 12:38) (73 - 88)  BP: 162/77 (2018 12:38) (130/68 - 192/86)  BP(mean): --  RR: 18 (2018 12:38) (18 - 18)  SpO2: 96% (2018 08:45) (93% - 96%)    LABS:                        12.7   8.61  )-----------( 199      ( 2018 08:18 )             38.5     07-30    140  |  102  |  13  ----------------------------<  134<H>  4.0   |  25  |  0.6<L>    Ca    9.2      2018 08:18  Phos  2.6     07-29  Mg     2.0     07-29                  CARDIAC MARKERS ( 2018 17:50 )  x     / 0.17 ng/mL / x     / x     / 6.4 ng/mL      CAPILLARY BLOOD GLUCOSE          RADIOLOGY:      PHYSICAL EXAM:    General: comfortable, NAD  Neurology:  nonfocal  Head:  Normocephalic, atraumatic  ENT:  Mucosa moist, no ulcerations  Neck:  Supple, no JVD,   Skin: no breakdowns (as per RN)  Resp: CTA B/L  CV: RRR, S1S2,   GI: Soft, NT, bowel sounds  MS: No edema, + peripheral pulses,       ADMISSION SUMMARY  Patient is a 89y old Female who presents with a chief complaint of s/p fall ,transferred fro [facility] for trauma work up  found to have T 12 and pelvic fracture (2018 06:13)     she currently admitted to medicine with the primary diagnosis of Pubic ramus fracture      ASSESSMENT & PLAN    89 F with Hx of HTN, HLD ,arthritis ,transferred from [facility] for trauma work up  after a mechanical fall 2 days ago ,found to have pelvic and T12 fx.    # s/p mechanical fall;  -L acetabular fx and T 12 fx  -cleared by trauma   -Weight bearing as tolerated as per ortho  -no intervention for pelvic fx as per ortho  -Neuro Surgery; for T12 spine fx >> MRI T and L spine ordered  -c/w pain control (limit NSAIDS)  -PT/rehab -TLSO brace order in, prescription written, PT did not have brace to do rehab today.     # Troponemia - stable  -no chest pain or shortness of breath  -EKG: inferior & anterolateral infarct > unchanged from last yr  -check TTE to evaluate for RWMA  -check lipids and HBA1C  -cont medical therapy with ASA, statin, lisinopril, add metoprolol  -Trop: 0.17 > 0.17, CK: 6.4 > 6.4  -Echo results pending  -f/u cardio recs - d/c tele    #Incidental pulmonary nodule found on CT  -Pulm: Pt will need a repeat CT chest and follow up with Dr. Donnelly in 6 months    #HTN  -Nephro: D/C HCTZ due to hyperkalemia  -c/w lisinopril     #CAD/DLD;  -aspirin dose decreased to 81 mg   -cont statin     #Hypokalemia-resolved 3.7   -KCL prn    #DVT ppx; cw lovenox    #Diet; DASH    # Planned Disposition: ________ LOW GUZMÁN 89y Female  MRN#: [id number]   CODE STATUS:________      SUBJECTIVE  Patient is a 89y old Female who presents with a chief complaint of s/p fall ,transferred fro [facility] for trauma work up  found to have T 12 and pelvic fracture (2018 06:13)    she is currently admitted to medicine with the primary diagnosis of Pubic ramus fracture    Today is hospital day 3d, and this morning she complains of having a sore back. She has not gotten OOB since and is still currently waiting for her TLSO brace to arrive to begin PT rehab.    No acute overnight events.     OBJECTIVE  PAST MEDICAL & SURGICAL HISTORY  Arthritis  High cholesterol  Hypertension  History of   S/P cholecystectomy    ALLERGIES:  No Known Allergies    MEDICATIONS:  STANDING MEDICATIONS  aspirin  chewable 81 milliGRAM(s) Oral daily  docusate sodium 100 milliGRAM(s) Oral two times a day  enoxaparin Injectable 40 milliGRAM(s) SubCutaneous at bedtime  lisinopril 20 milliGRAM(s) Oral daily  multivitamin 1 Tablet(s) Oral daily  senna 2 Tablet(s) Oral at bedtime  simvastatin 40 milliGRAM(s) Oral at bedtime    PRN MEDICATIONS  acetaminophen   Tablet 650 milliGRAM(s) Oral every 6 hours PRN  magnesium hydroxide Suspension 30 milliLiter(s) Oral daily PRN  morphine  - Injectable 2 milliGRAM(s) IV Push five times a day PRN    HOME MEDICATIONS  Home Medications:  aspirin 325 mg oral tablet: 1 tab(s) orally once a day (2018 05:58)  lisinopril-hydroCHLOROthiazide 20 mg-25 mg oral tablet: 1 tab(s) orally once a day (2018 05:58)  Mobic 15 mg oral tablet: 1 tab(s) orally once a day, As Needed (2018 05:58)  Multiple Vitamins oral tablet: 1 tab(s) orally once a day (2018 05:58)  simvastatin 40 mg oral tablet: 1 tab(s) orally once a day (at bedtime) (2018 05:58)  Tylenol 325 mg oral tablet: 1 tab(s) orally prn (2018 05:58)      VITAL SIGNS: Last 24 Hours  T(C): 36.2 (2018 12:38), Max: 36.9 (2018 15:13)  T(F): 97.1 (2018 12:38), Max: 98.5 (2018 15:13)  HR: 83 (2018 12:38) (73 - 88)  BP: 162/77 (2018 12:38) (130/68 - 192/86)  BP(mean): --  RR: 18 (2018 12:38) (18 - 18)  SpO2: 96% (2018 08:45) (93% - 96%)    LABS:                        12.7   8.61  )-----------( 199      ( 2018 08:18 )             38.5     07-30    140  |  102  |  13  ----------------------------<  134<H>  4.0   |  25  |  0.6<L>    Ca    9.2      2018 08:18  Phos  2.6     07-29  Mg     2.0     07-29                  CARDIAC MARKERS ( 2018 17:50 )  x     / 0.17 ng/mL / x     / x     / 6.4 ng/mL      CAPILLARY BLOOD GLUCOSE          RADIOLOGY:      PHYSICAL EXAM:    General: comfortable, NAD  Neurology:  nonfocal  Head:  Normocephalic, atraumatic  ENT:  Mucosa moist, no ulcerations  Neck:  Supple, no JVD,   Skin: no breakdowns (as per RN)  Resp: CTA B/L  CV: RRR, S1S2,   GI: Soft, NT, bowel sounds  MS: No edema, + peripheral pulses,       ADMISSION SUMMARY  Patient is a 89y old Female who presents with a chief complaint of s/p fall ,transferred fro [facility] for trauma work up  found to have T 12 and pelvic fracture (2018 06:13)     she currently admitted to medicine with the primary diagnosis of Pubic ramus fracture      ASSESSMENT & PLAN    89 F with Hx of HTN, HLD ,arthritis ,transferred from [facility] for trauma work up  after a mechanical fall 2 days ago ,found to have pelvic and T12 fx.    # s/p mechanical fall;  -L acetabular fx and T 12 fx  -cleared by trauma   -Weight bearing as tolerated as per ortho  -no intervention for pelvic fx as per ortho  -Neuro Surgery; for T12 spine fx >> MRI T and L spine ordered  -c/w pain control (limit NSAIDS)  -PT/rehab -TLSO brace order in, prescription written, PT did not have brace to do rehab today.     # Troponemia - stable  -no chest pain or shortness of breath  -EKG: inferior & anterolateral infarct > unchanged from last yr  -check TTE to evaluate for RWMA  -check lipids and HBA1C  -added coreg 3.125mg BID as per recommendation by nephro  -cont medical therapy with ASA, statin, lisinopril, add metoprolol  -Trop: 0.17 > 0.17, CK: 6.4 > 6.4  -f/u cardio recs - d/c tele    #Incidental pulmonary nodule found on CT  -Pulm: Pt will need a repeat CT chest and follow up with Dr. Donnelly in 6 months    #HTN  -Nephro: D/C HCTZ due to hyperkalemia  -c/w lisinopril     #CAD/DLD;  -aspirin dose decreased to 81 mg   -cont statin     #Hypokalemia-resolved 3.7   -KCL prn    #DVT ppx; cw lovenox    #Diet; DASH    # Planned Disposition: ________ LOW GUZMÁN 89y Female  MRN#: [id number]   CODE STATUS:________      SUBJECTIVE  Patient is a 89y old Female who presents with a chief complaint of s/p fall ,transferred fro [facility] for trauma work up  found to have T 12 and pelvic fracture (2018 06:13)    she is currently admitted to medicine with the primary diagnosis of Pubic ramus fracture    Today is hospital day 3d, and this morning she complains of having a sore back. She has not gotten OOB since and is still currently waiting for her TLSO brace to arrive to begin PT rehab.    No acute overnight events.     OBJECTIVE  PAST MEDICAL & SURGICAL HISTORY  Arthritis  High cholesterol  Hypertension  History of   S/P cholecystectomy    ALLERGIES:  No Known Allergies    MEDICATIONS:  STANDING MEDICATIONS  aspirin  chewable 81 milliGRAM(s) Oral daily  docusate sodium 100 milliGRAM(s) Oral two times a day  enoxaparin Injectable 40 milliGRAM(s) SubCutaneous at bedtime  lisinopril 20 milliGRAM(s) Oral daily  multivitamin 1 Tablet(s) Oral daily  senna 2 Tablet(s) Oral at bedtime  simvastatin 40 milliGRAM(s) Oral at bedtime    PRN MEDICATIONS  acetaminophen   Tablet 650 milliGRAM(s) Oral every 6 hours PRN  magnesium hydroxide Suspension 30 milliLiter(s) Oral daily PRN  morphine  - Injectable 2 milliGRAM(s) IV Push five times a day PRN    HOME MEDICATIONS  Home Medications:  aspirin 325 mg oral tablet: 1 tab(s) orally once a day (2018 05:58)  lisinopril-hydroCHLOROthiazide 20 mg-25 mg oral tablet: 1 tab(s) orally once a day (2018 05:58)  Mobic 15 mg oral tablet: 1 tab(s) orally once a day, As Needed (2018 05:58)  Multiple Vitamins oral tablet: 1 tab(s) orally once a day (2018 05:58)  simvastatin 40 mg oral tablet: 1 tab(s) orally once a day (at bedtime) (2018 05:58)  Tylenol 325 mg oral tablet: 1 tab(s) orally prn (2018 05:58)      VITAL SIGNS: Last 24 Hours  T(C): 36.2 (2018 12:38), Max: 36.9 (2018 15:13)  T(F): 97.1 (2018 12:38), Max: 98.5 (2018 15:13)  HR: 83 (2018 12:38) (73 - 88)  BP: 162/77 (2018 12:38) (130/68 - 192/86)  BP(mean): --  RR: 18 (2018 12:38) (18 - 18)  SpO2: 96% (2018 08:45) (93% - 96%)    LABS:                        12.7   8.61  )-----------( 199      ( 2018 08:18 )             38.5     07-30    140  |  102  |  13  ----------------------------<  134<H>  4.0   |  25  |  0.6<L>    Ca    9.2      2018 08:18  Phos  2.6     07-29  Mg     2.0     07-29                  CARDIAC MARKERS ( 2018 17:50 )  x     / 0.17 ng/mL / x     / x     / 6.4 ng/mL      CAPILLARY BLOOD GLUCOSE          RADIOLOGY:  < from: Transthoracic Echocardiogram (18 @ 09:51) >  Summary:   1. Left ventricular ejection fraction, by visual estimation, is 30%.   2. Moderately to severely decreased global left ventricular systolic   function.   3. Multiple left ventricular regional wall motion abnormalities exist.   See wall motion findings.   4. Spectral Doppler shows impaired relaxation pattern of left   ventricular myocardial filling (Grade I diastolic dysfunction).   5. Mild mitral valve regurgitation.    PHYSICIAN INTERPRETATION:  Left Ventricle: The left ventricular internal cavity size is normal. Left   ventricular wall thickness is normal. Global LV systolic function was   moderately to severely decreased. Left ventricular ejection fraction, by   visual estimation, is 30%. Spectral Doppler shows impaired relaxation   pattern of left ventricular myocardial filling (Grade I diastolic   dysfunction).    < end of copied text >      PHYSICAL EXAM:    General: comfortable, NAD  Neurology:  nonfocal  Head:  Normocephalic, atraumatic  ENT:  Mucosa moist, no ulcerations  Neck:  Supple, no JVD,   Skin: no breakdowns (as per RN)  Resp: CTA B/L  CV: RRR, S1S2,   GI: Soft, NT, bowel sounds  MS: No edema, + peripheral pulses,       ADMISSION SUMMARY  Patient is a 89y old Female who presents with a chief complaint of s/p fall ,transferred fro [facility] for trauma work up  found to have T 12 and pelvic fracture (2018 06:13)     she currently admitted to medicine with the primary diagnosis of Pubic ramus fracture      ASSESSMENT & PLAN    89 F with Hx of HTN, HLD ,arthritis ,transferred from [facility] for trauma work up  after a mechanical fall 2 days ago ,found to have pelvic and T12 fx.    # s/p mechanical fall;  -L acetabular fx and T 12 fx  -cleared by trauma   -Weight bearing as tolerated as per ortho  -no intervention for pelvic fx as per ortho  -Neuro Surgery; for T12 spine fx >> MRI T and L spine ordered  -c/w pain control (limit NSAIDS)  -PT/rehab -TLSO brace order in, prescription written, PT did not have brace to do rehab today.     # Troponemia - stable  -no chest pain or shortness of breath  -EKG: inferior & anterolateral infarct > unchanged from last yr  -ECHO showed ischemic cardiomyopathy EF of 30% and AWMI  -pt is to NPO at midnight and cath tmr  -cont medical therapy with ASA, statin, lisinopril, added coreg 3.125mg BID as per nephro  -Trop: 0.17 > 0.17, CK: 6.4 > 6.4    #Incidental pulmonary nodule found on CT  -Pulm: Pt will need a repeat CT chest and follow up with Dr. Donnelly in 6 months    #HTN  -Nephro: D/C HCTZ due to hyperkalemia  -c/w lisinopril     #CAD/DLD;  -aspirin dose decreased to 81 mg   -cont statin     #Hypokalemia-resolved 3.7   -KCL prn    #DVT ppx; cw lovenox    #Diet; DASH    # Planned Disposition: ________

## 2018-07-30 NOTE — PROGRESS NOTE ADULT - SUBJECTIVE AND OBJECTIVE BOX
NEPHROLOGY FOLLOW UP:    pt seen and examined  still waiting for TLSO brace  bp's fair  off ivf's  spoke with nursing    PAST MEDICAL & SURGICAL HISTORY:  Arthritis  High cholesterol  Hypertension  History of   S/P cholecystectomy    Allergies:  No Known Allergies    Home Medications Reviewed    SOCIAL HISTORY:  Denies ETOH,Smoking,   FAMILY HISTORY:  No pertinent family history in first degree relatives    REVIEW OF SYSTEMS:  All other review of systems is negative unless indicated above.    PHYSICAL EXAM:  NAD  awake and alert  dry mm  no jvd  cta bl  rrr  soft, nt  no edema    Hospital Medications:   MEDICATIONS  (STANDING):  aspirin  chewable 81 milliGRAM(s) Oral daily  docusate sodium 100 milliGRAM(s) Oral two times a day  enoxaparin Injectable 40 milliGRAM(s) SubCutaneous at bedtime  lisinopril 20 milliGRAM(s) Oral daily  multivitamin 1 Tablet(s) Oral daily  senna 2 Tablet(s) Oral at bedtime  simvastatin 40 milliGRAM(s) Oral at bedtime        VITALS:  T(F): 97.1 (18 @ 12:38), Max: 98.5 (18 @ 15:13)  HR: 83 (18 @ 12:38)  BP: 162/77 (18 @ 12:38)  RR: 18 (18 @ 12:38)  SpO2: 96% (18 @ 08:45)  Wt(kg): --     @ :  -   @ 07:00  --------------------------------------------------------  IN: 1650 mL / OUT: 1950 mL / NET: -300 mL     @ 07:  -   @ 07:00  --------------------------------------------------------  IN: 240 mL / OUT: 1050 mL / NET: -810 mL     @ :  -   @ 14:28  --------------------------------------------------------  IN: 0 mL / OUT: 950 mL / NET: -950 mL          LABS:      140  |  102  |  13  ----------------------------<  134<H>  4.0   |  25  |  0.6<L>    Ca    9.2      2018 08:18  Phos  2.6     -  Mg     2.0     -                            12.7   8.61  )-----------( 199      ( 2018 08:18 )             38.5       Urine Studies:        RADIOLOGY & ADDITIONAL STUDIES:

## 2018-07-30 NOTE — PROGRESS NOTE ADULT - SUBJECTIVE AND OBJECTIVE BOX
Subjective: pain 7/10, only takes Tylenol for pain    T(C): 36.2 (07-30-18 @ 12:38), Max: 36.9 (07-29-18 @ 20:22)  HR: 83 (07-30-18 @ 12:38) (73 - 83)  BP: 162/77 (07-30-18 @ 12:38) (130/68 - 162/77)  RR: 18 (07-30-18 @ 12:38) (18 - 18)  SpO2: 96% (07-30-18 @ 08:45) (93% - 96%)  Wt(kg): --    Exam: sitting in chair in NAD, verbalizing well, moving legs without difficulty, no leg pain, brace delivered    CBC Full  -  ( 30 Jul 2018 08:18 )  WBC Count : 8.61 K/uL  Hemoglobin : 12.7 g/dL  Hematocrit : 38.5 %  Platelet Count - Automated : 199 K/uL  Mean Cell Volume : 86.5 fL  Mean Cell Hemoglobin : 28.5 pg  Mean Cell Hemoglobin Concentration : 33.0 g/dL  Auto Neutrophil # : 5.42 K/uL  Auto Lymphocyte # : 2.01 K/uL  Auto Monocyte # : 0.94 K/uL  Auto Eosinophil # : 0.16 K/uL  Auto Basophil # : 0.03 K/uL  Auto Neutrophil % : 63.0 %  Auto Lymphocyte % : 23.3 %  Auto Monocyte % : 10.9 %  Auto Eosinophil % : 1.9 %  Auto Basophil % : 0.3 %    07-30    140  |  102  |  13  ----------------------------<  134<H>  4.0   |  25  |  0.6<L>    Ca    9.2      30 Jul 2018 08:18  Phos  2.6     07-29  Mg     2.0     07-29      Assessment- stable    Plan- patient to try conservative management with brace

## 2018-07-30 NOTE — MEDICAL STUDENT PROGRESS NOTE(EDUCATION) - SUBJECTIVE AND OBJECTIVE BOX
89 y/o F PMH of HTN, HLD, arthritis was transferred from [facility] for trauma work up s/p mechanical fall w/ CC of back and L lateral thigh pain of 2d duration, found to have L acetabular and L inferior pubic ramis fracture and T12 compression fracture on CT.    Overnight complications: the patient only complains of having a sore back. She has not gotten OOB since and is still currently waiting for her TLSO brace to arrive to begin PT rehab.    Vital Signs (Jul 30 2018)  T(F): 97.3  HR: 73  BP: 130/68  RR:   SpO2: 96% (28 Jul 2018 20:45) (96% - 96%)  MEDICATIONS  (STANDING):  aspirin  chewable 81 milliGRAM(s) Oral daily  docusate sodium 100 milliGRAM(s) Oral two times a day  enoxaparin Injectable 40 milliGRAM(s) SubCutaneous at bedtime  lisinopril 20 milliGRAM(s) Oral daily  multivitamin 1 Tablet(s) Oral daily  senna 2 Tablet(s) Oral at bedtime  simvastatin 40 milliGRAM(s) Oral at bedtime  sodium chloride 0.9%. 1000 milliLiter(s) (75 mL/Hr) IV Continuous <Continuous>    MEDICATIONS  (PRN):  acetaminophen   Tablet 650 milliGRAM(s) Oral every 6 hours PRN pain  magnesium hydroxide Suspension 30 milliLiter(s) Oral daily PRN Constipation  morphine  - Injectable 2 milliGRAM(s) IV Push five times a day PRN Severe Pain (7 - 10)    Labs:                        12.0   7.61  )-----------( 164      ( 29 Jul 2018 07:28 )             36.1                         12.9   12.31 )-----------( 215      ( 28 Jul 2018 10:38 )             38.5     29 Jul 2018 07:28    140    |  104    |  16     ----------------------------<  139    3.7     |  25     |  0.6    28 Jul 2018 10:38    139    |  101    |  18     ----------------------------<  157    3.7     |  24     |  0.7      Ca    8.7        29 Jul 2018 07:28  Ca    9.1        28 Jul 2018 10:38  Phos  2.6       29 Jul 2018 07:28  Phos  2.2       28 Jul 2018 13:30  Mg     2.0       29 Jul 2018 07:28  Mg     1.9       28 Jul 2018 10:38    TPro  6.0    /  Alb  3.6    /  TBili  0.5    /  DBili  x      /  AST  29     /  ALT  40     /  AlkPhos  51     28 Jul 2018 10:38          General: comfortable, NAD  Neurology:  nonfocal  Head:  Normocephalic, atraumatic  ENT:  Mucosa moist, no ulcerations  Neck:  Supple, no JVD,   Skin: no breakdowns (as per RN)  Resp: CTA B/L  CV: RRR, S1S2,   GI: Soft, NT, bowel sounds  MS: No edema, + peripheral pulses, 89 y/o F PMH of HTN, HLD, arthritis was transferred from [facility] for trauma work up s/p mechanical fall w/ CC of back and L lateral thigh pain of 2d duration, found to have L acetabular and L inferior pubic ramis fracture and T12 compression fracture on CT.    Overnight complications: the patient only complains of having a sore back. She has not gotten OOB since and is still currently waiting for her TLSO brace to arrive to begin PT rehab.    Vital Signs (Jul 30 2018)  T(F): 97.3  HR: 73  BP: 130/68  RR: 18  SpO2: 96%     MEDICATIONS  (STANDING):  aspirin  chewable 81 milliGRAM(s) Oral daily  docusate sodium 100 milliGRAM(s) Oral two times a day  enoxaparin Injectable 40 milliGRAM(s) SubCutaneous at bedtime  lisinopril 20 milliGRAM(s) Oral daily  multivitamin 1 Tablet(s) Oral daily  senna 2 Tablet(s) Oral at bedtime  simvastatin 40 milliGRAM(s) Oral at bedtime  sodium chloride 0.9%. 1000 milliLiter(s) (75 mL/Hr) IV Continuous <Continuous>    MEDICATIONS  (PRN):  acetaminophen   Tablet 650 milliGRAM(s) Oral every 6 hours PRN pain  magnesium hydroxide Suspension 30 milliLiter(s) Oral daily PRN Constipation  morphine  - Injectable 2 milliGRAM(s) IV Push five times a day PRN Severe Pain (7 - 10)    Labs: Jul 30, 2018                        12.7   8.61  )-----------( 199                   38.5                140    |  102   |  13     ----------------------------<  134    4.0     |  25     |  0.6        Ca    8.7        29 Jul 2018 07:28  Ca    9.1        28 Jul 2018 10:38  Phos  2.6       29 Jul 2018 07:28  Phos  2.2       28 Jul 2018 13:30  Mg     2.0       29 Jul 2018 07:28  Mg     1.9       28 Jul 2018 10:38    TPro  6.0    /  Alb  3.6    /  TBili  0.5    /  DBili  x      /  AST  29     /  ALT  40     /  AlkPhos  51     28 Jul 2018 10:38          General: comfortable, NAD  Neurology:  nonfocal  Head:  Normocephalic, atraumatic  ENT:  Mucosa moist, no ulcerations  Neck:  Supple, no JVD,   Skin: no breakdowns (as per RN)  Resp: CTA B/L  CV: RRR, S1S2,   GI: Soft, NT, bowel sounds  MS: No edema, + peripheral pulses,

## 2018-07-31 LAB
ALBUMIN SERPL ELPH-MCNC: 3.5 G/DL — SIGNIFICANT CHANGE UP (ref 3.5–5.2)
ALP SERPL-CCNC: 51 U/L — SIGNIFICANT CHANGE UP (ref 30–115)
ALT FLD-CCNC: 33 U/L — SIGNIFICANT CHANGE UP (ref 0–41)
ANION GAP SERPL CALC-SCNC: 17 MMOL/L — HIGH (ref 7–14)
AST SERPL-CCNC: 29 U/L — SIGNIFICANT CHANGE UP (ref 0–41)
BASOPHILS # BLD AUTO: 0.03 K/UL — SIGNIFICANT CHANGE UP (ref 0–0.2)
BASOPHILS NFR BLD AUTO: 0.3 % — SIGNIFICANT CHANGE UP (ref 0–1)
BILIRUB SERPL-MCNC: 0.5 MG/DL — SIGNIFICANT CHANGE UP (ref 0.2–1.2)
BUN SERPL-MCNC: 13 MG/DL — SIGNIFICANT CHANGE UP (ref 10–20)
CALCIUM SERPL-MCNC: 9.1 MG/DL — SIGNIFICANT CHANGE UP (ref 8.5–10.1)
CHLORIDE SERPL-SCNC: 101 MMOL/L — SIGNIFICANT CHANGE UP (ref 98–110)
CO2 SERPL-SCNC: 22 MMOL/L — SIGNIFICANT CHANGE UP (ref 17–32)
CREAT SERPL-MCNC: 0.6 MG/DL — LOW (ref 0.7–1.5)
EOSINOPHIL # BLD AUTO: 0.3 K/UL — SIGNIFICANT CHANGE UP (ref 0–0.7)
EOSINOPHIL NFR BLD AUTO: 3.1 % — SIGNIFICANT CHANGE UP (ref 0–8)
GLUCOSE SERPL-MCNC: 146 MG/DL — HIGH (ref 70–99)
HCT VFR BLD CALC: 40.9 % — SIGNIFICANT CHANGE UP (ref 37–47)
HGB BLD-MCNC: 13.5 G/DL — SIGNIFICANT CHANGE UP (ref 12–16)
IMM GRANULOCYTES NFR BLD AUTO: 0.4 % — HIGH (ref 0.1–0.3)
LYMPHOCYTES # BLD AUTO: 1.61 K/UL — SIGNIFICANT CHANGE UP (ref 1.2–3.4)
LYMPHOCYTES # BLD AUTO: 16.6 % — LOW (ref 20.5–51.1)
MAGNESIUM SERPL-MCNC: 2 MG/DL — SIGNIFICANT CHANGE UP (ref 1.8–2.4)
MCHC RBC-ENTMCNC: 28.5 PG — SIGNIFICANT CHANGE UP (ref 27–31)
MCHC RBC-ENTMCNC: 33 G/DL — SIGNIFICANT CHANGE UP (ref 32–37)
MCV RBC AUTO: 86.5 FL — SIGNIFICANT CHANGE UP (ref 81–99)
MONOCYTES # BLD AUTO: 0.86 K/UL — HIGH (ref 0.1–0.6)
MONOCYTES NFR BLD AUTO: 8.8 % — SIGNIFICANT CHANGE UP (ref 1.7–9.3)
NEUTROPHILS # BLD AUTO: 6.88 K/UL — HIGH (ref 1.4–6.5)
NEUTROPHILS NFR BLD AUTO: 70.8 % — SIGNIFICANT CHANGE UP (ref 42.2–75.2)
NRBC # BLD: 0 /100 WBCS — SIGNIFICANT CHANGE UP (ref 0–0)
PLATELET # BLD AUTO: 220 K/UL — SIGNIFICANT CHANGE UP (ref 130–400)
POTASSIUM SERPL-MCNC: 4.3 MMOL/L — SIGNIFICANT CHANGE UP (ref 3.5–5)
POTASSIUM SERPL-SCNC: 4.3 MMOL/L — SIGNIFICANT CHANGE UP (ref 3.5–5)
PROT SERPL-MCNC: 6.1 G/DL — SIGNIFICANT CHANGE UP (ref 6–8)
RBC # BLD: 4.73 M/UL — SIGNIFICANT CHANGE UP (ref 4.2–5.4)
RBC # FLD: 14.2 % — SIGNIFICANT CHANGE UP (ref 11.5–14.5)
SODIUM SERPL-SCNC: 140 MMOL/L — SIGNIFICANT CHANGE UP (ref 135–146)
WBC # BLD: 9.72 K/UL — SIGNIFICANT CHANGE UP (ref 4.8–10.8)
WBC # FLD AUTO: 9.72 K/UL — SIGNIFICANT CHANGE UP (ref 4.8–10.8)

## 2018-07-31 PROCEDURE — 99231 SBSQ HOSP IP/OBS SF/LOW 25: CPT

## 2018-07-31 RX ORDER — OXYCODONE HYDROCHLORIDE 5 MG/1
5 TABLET ORAL EVERY 4 HOURS
Qty: 0 | Refills: 0 | Status: DISCONTINUED | OUTPATIENT
Start: 2018-07-31 | End: 2018-08-01

## 2018-07-31 RX ADMIN — Medication 650 MILLIGRAM(S): at 21:49

## 2018-07-31 RX ADMIN — Medication 81 MILLIGRAM(S): at 12:31

## 2018-07-31 RX ADMIN — OXYCODONE HYDROCHLORIDE 5 MILLIGRAM(S): 5 TABLET ORAL at 12:32

## 2018-07-31 RX ADMIN — CARVEDILOL PHOSPHATE 3.12 MILLIGRAM(S): 80 CAPSULE, EXTENDED RELEASE ORAL at 05:55

## 2018-07-31 RX ADMIN — OXYCODONE HYDROCHLORIDE 5 MILLIGRAM(S): 5 TABLET ORAL at 12:30

## 2018-07-31 RX ADMIN — LISINOPRIL 20 MILLIGRAM(S): 2.5 TABLET ORAL at 05:55

## 2018-07-31 RX ADMIN — CARVEDILOL PHOSPHATE 3.12 MILLIGRAM(S): 80 CAPSULE, EXTENDED RELEASE ORAL at 18:15

## 2018-07-31 RX ADMIN — Medication 650 MILLIGRAM(S): at 09:08

## 2018-07-31 RX ADMIN — SIMVASTATIN 40 MILLIGRAM(S): 20 TABLET, FILM COATED ORAL at 21:50

## 2018-07-31 RX ADMIN — Medication 100 MILLIGRAM(S): at 18:15

## 2018-07-31 RX ADMIN — ENOXAPARIN SODIUM 40 MILLIGRAM(S): 100 INJECTION SUBCUTANEOUS at 21:49

## 2018-07-31 RX ADMIN — OXYCODONE HYDROCHLORIDE 5 MILLIGRAM(S): 5 TABLET ORAL at 18:16

## 2018-07-31 RX ADMIN — SENNA PLUS 2 TABLET(S): 8.6 TABLET ORAL at 21:49

## 2018-07-31 RX ADMIN — Medication 100 MILLIGRAM(S): at 05:55

## 2018-07-31 RX ADMIN — Medication 1 TABLET(S): at 12:31

## 2018-07-31 NOTE — PROGRESS NOTE ADULT - ASSESSMENT
89 F with Hx of HTN, HLD ,arthritis ,transferred from [facility] for trauma work up  after a mechanical fall 2 days ago ,found to have pelvic and T12 fx.    #L acetabular fx and T 12 fx, s/p mechanical fall - stable  -cleared by trauma   -Weight bearing as tolerated as per ortho  -no intervention for pelvic fx as per ortho  -Neuro Surgery conservative tx  -c/w pain control: oxycodone and Tylenol PRN (limit NSAIDS)  -TLSO brace received, PT/rehab pending     #Troponemia - stable  -ECHO showed AWMI and ischemic cardiomyopathy EF of 30%  -pt cancelled cath, Dr. Khoury aware and ok  -cont medical therapy with ASA, statin, lisinopril, coreg 3.125mg BID  -Trop: 0.17 > 0.17, CK: 6.4 > 6.4    #Incidental pulmonary nodule found on CT  -Pulm: Pt will need a repeat CT chest and follow up with Dr. Donnelly in 6 months    #HTN  -Nephro: D/C HCTZ due to hyperkalemia  -c/w lisinopril   -added coreg 3.125 mg PO BID    #CAD/DLD;  -ASA 81mg, statin, lisinopril, coreg 3.125mg BID    #Hypokalemia-resolved 3.7   -KCL prn    #DVT ppx; cw lovenox    #Diet; DASH    #Planned Disposition:

## 2018-07-31 NOTE — PHYSICAL THERAPY INITIAL EVALUATION ADULT - GENERAL OBSERVATIONS, REHAB EVAL
13:10- 13:43 33 min  pt rec OOB in chair in NAD, dtr at the b/s, pt agreeable to PT, pt had no c/o, +TLSO brace in the room

## 2018-07-31 NOTE — PROGRESS NOTE ADULT - ASSESSMENT
resolved electrolyte derangements  HTN   fall  pelvic fracture / vertebral fracture  pulm nodules  EF 30% / ischemic cmp / old AWMI    plan:    off HCTZ  cont lisinopril  cont coreg 6.125mg po bid  declining kyphoplasty  declining cardiac cath  TLSO brace  PT / Rehab  pain control  LMWH

## 2018-07-31 NOTE — PROGRESS NOTE ADULT - SUBJECTIVE AND OBJECTIVE BOX
89 F with Hx of HTN, HLD ,arthritis ,transferred from [facility] for trauma work up  after a mechanical fall 2 days ago ,found to have pelvic and T12 fx.    #L acetabular fx and T 12 fx, s/p mechanical fall - stable  -cleared by trauma   -Weight bearing as tolerated as per ortho  -no intervention for pelvic fx as per ortho  -Neuro Surgery conservative tx  -c/w pain control (limit NSAIDS)  -PT/rehab - pending TLSO brace for PT/rehab     #Troponemia - stable  -ECHO showed AWMI and ischemic cardiomyopathy EF of 30%  -cath today with Dr. Ventura Ochoa  -cont medical therapy with ASA, statin, lisinopril, added coreg 3.125mg BID as per nephro  -Trop: 0.17 > 0.17, CK: 6.4 > 6.4    #Incidental pulmonary nodule found on CT  -Pulm: Pt will need a repeat CT chest and follow up with Dr. Donnelly in 6 months    #HTN  -Nephro: D/C HCTZ due to hyperkalemia  -c/w lisinopril   -added coreg 3.125 mg PO BID    #CAD/DLD;  -aspirin dose decreased to 81 mg   -cont statin     #Hypokalemia-resolved 3.7   -KCL prn    #DVT ppx; cw lovenox    #Diet; DASH    #Planned Disposition: 89 F with Hx of HTN, HLD ,arthritis ,transferred from [facility] for trauma work up  after a mechanical fall 2 days ago ,found to have pelvic and T12 fx.    #L acetabular fx and T 12 fx, s/p mechanical fall - stable  -cleared by trauma   -Weight bearing as tolerated as per ortho  -no intervention for pelvic fx as per ortho  -Neuro Surgery conservative tx  -c/w pain control: oxycodone and Tylenol PRN (limit NSAIDS)  -TLSO brace received, PT/rehab pending     #Troponemia - stable  -ECHO showed AWMI and ischemic cardiomyopathy EF of 30%  -pt cancelled cath, Dr. Khoury aware and ok  -cont medical therapy with ASA, statin, lisinopril, coreg 3.125mg BID  -Trop: 0.17 > 0.17, CK: 6.4 > 6.4    #Incidental pulmonary nodule found on CT  -Pulm: Pt will need a repeat CT chest and follow up with Dr. Donnelly in 6 months    #HTN  -Nephro: D/C HCTZ due to hyperkalemia  -c/w lisinopril   -added coreg 3.125 mg PO BID    #CAD/DLD;  -ASA 81mg, statin, lisinopril, coreg 3.125mg BID    #Hypokalemia-resolved 3.7   -KCL prn    #DVT ppx; cw lovenox    #Diet; DASH    #Planned Disposition: LOW GZUMÁN 89y Female  MRN#: [id number]   CODE STATUS:________      SUBJECTIVE  Patient is a 89y old Female who presents with a chief complaint of s/p fall ,transferred fro [facility] for trauma work up  found to have T 12 and pelvic fracture (2018 06:13)    she is currently admitted to medicine with the primary diagnosis of Pubic ramus fracture    Today is hospital day 4d, and this morning she is lying in bed not in distress. Pt states she does not want cath and wish to f/u with her own cardiologist, and she is happy her hip brace arrived today.    No acute overnight events.     OBJECTIVE  PAST MEDICAL & SURGICAL HISTORY  Arthritis  High cholesterol  Hypertension  History of   S/P cholecystectomy    ALLERGIES:  No Known Allergies    MEDICATIONS:  STANDING MEDICATIONS  aspirin  chewable 81 milliGRAM(s) Oral daily  carvedilol 3.125 milliGRAM(s) Oral every 12 hours  docusate sodium 100 milliGRAM(s) Oral two times a day  enoxaparin Injectable 40 milliGRAM(s) SubCutaneous at bedtime  lisinopril 20 milliGRAM(s) Oral daily  multivitamin 1 Tablet(s) Oral daily  oxyCODONE    IR 5 milliGRAM(s) Oral every 4 hours  senna 2 Tablet(s) Oral at bedtime  simvastatin 40 milliGRAM(s) Oral at bedtime    PRN MEDICATIONS  acetaminophen   Tablet 650 milliGRAM(s) Oral every 6 hours PRN  magnesium hydroxide Suspension 30 milliLiter(s) Oral daily PRN    HOME MEDICATIONS  Home Medications:  aspirin 325 mg oral tablet: 1 tab(s) orally once a day (2018 05:58)  lisinopril-hydroCHLOROthiazide 20 mg-25 mg oral tablet: 1 tab(s) orally once a day (2018 05:58)  Mobic 15 mg oral tablet: 1 tab(s) orally once a day, As Needed (2018 05:58)  Multiple Vitamins oral tablet: 1 tab(s) orally once a day (2018 05:58)  simvastatin 40 mg oral tablet: 1 tab(s) orally once a day (at bedtime) (2018 05:58)  Tylenol 325 mg oral tablet: 1 tab(s) orally prn (2018 05:58)      VITAL SIGNS: Last 24 Hours  T(C): 35.6 (2018 14:33), Max: 37.1 (2018 20:52)  T(F): 96 (2018 14:33), Max: 98.8 (2018 20:52)  HR: 71 (2018 14:33) (71 - 73)  BP: 127/64 (2018 14:33) (122/58 - 145/70)  BP(mean): --  RR: 18 (2018 14:33) (16 - 18)  SpO2: 93% (2018 23:06) (93% - 93%)    LABS:                        13.5   9.72  )-----------( 220      ( 2018 09:43 )             40.9     07-31    140  |  101  |  13  ----------------------------<  146<H>  4.3   |  22  |  0.6<L>    Ca    9.1      2018 09:43  Mg     2.0     -    TPro  6.1  /  Alb  3.5  /  TBili  0.5  /  DBili  x   /  AST  29  /  ALT  33  /  AlkPhos  51  07-31    LIVER FUNCTIONS - ( 2018 09:43 )  Alb: 3.5 g/dL / Pro: 6.1 g/dL / ALK PHOS: 51 U/L / ALT: 33 U/L / AST: 29 U/L / GGT: x                         CAPILLARY BLOOD GLUCOSE          RADIOLOGY:      PHYSICAL EXAM:    GENERAL: NAD, well-developed, AAOx3  HEENT:  Atraumatic, Normocephalic. EOMI, PERRLA, conjunctiva and sclera clear, No JVD  PULMONARY: Clear to auscultation bilaterally; No wheeze  CARDIOVASCULAR: Regular rate and rhythm; No murmurs, rubs, or gallops  GASTROINTESTINAL: Soft, Nontender, Nondistended; Bowel sounds present  MUSCULOSKELETAL:  2+ Peripheral Pulses, No clubbing, cyanosis, or edema  NEUROLOGY: non-focal  SKIN: No rashes or lesions      ADMISSION SUMMARY  Patient is a 89y old Female who presents with a chief complaint of s/p fall ,transferred from [facility] for trauma work up  found to have T12 and pelvic fracture (2018 06:13)     she currently admitted to medicine with the primary diagnosis of Pubic ramus fracture      ASSESSMENT & PLAN      89 F with Hx of HTN, HLD ,arthritis ,transferred from [facility] for trauma work up  after a mechanical fall 2 days ago ,found to have pelvic and T12 fx.    #L acetabular fx and T 12 fx, s/p mechanical fall - stable  -cleared by trauma   -Weight bearing as tolerated as per ortho  -no intervention for pelvic fx as per ortho  -Neuro Surgery conservative tx  -c/w pain control: oxycodone and Tylenol PRN (limit NSAIDS)  -TLSO brace received, PT/rehab eval appreciated (c/w bedside therapy as tolerated 3hrs/day by PT/OT)    #Troponemia - stable  -ECHO showed AWMI and ischemic cardiomyopathy EF of 30%  -pt cancelled cath, Dr. Khoury aware and ok, no life vest is needed  -pt will f/u w/ her own cardiologist as outpatient  -cont medical therapy with ASA, statin, lisinopril, coreg 3.125mg BID  -Trop: 0.17 > 0.17, CK: 6.4 > 6.4    #Incidental pulmonary nodule found on CT  -Pulm: Pt will need a repeat CT chest and follow up with Dr. Donnelly in 6 months    #HTN  -Nephro: D/C HCTZ due to hyperkalemia  -c/w lisinopril   -added coreg 3.125 mg PO BID    #CAD/DLD;  -ASA 81mg, statin, lisinopril, coreg 3.125mg BID    #Hypokalemia-resolved 3.7   -KCL prn    #DVT ppx; cw lovenox    #Diet; DASH    #Planned Disposition:

## 2018-07-31 NOTE — PROGRESS NOTE ADULT - SUBJECTIVE AND OBJECTIVE BOX
NEPHROLOGY FOLLOW UP:    pt seen and examined  has tlso brace  cardio input noted, but pt declining inpt cardiac cath  still with back pain  no cp, fever, sob  bp's fair  electrolytes wnl now    PAST MEDICAL & SURGICAL HISTORY:  Arthritis  High cholesterol  Hypertension  History of   S/P cholecystectomy    Allergies:  No Known Allergies    Home Medications Reviewed    SOCIAL HISTORY:  Denies ETOH,Smoking,   FAMILY HISTORY:  No pertinent family history in first degree relatives    REVIEW OF SYSTEMS:  All other review of systems is negative unless indicated above.    PHYSICAL EXAM:  NAD  awake and alert  dry mm  no jvd  cta bl  rrr  soft, nt  no edema    Hospital Medications:   MEDICATIONS  (STANDING):  aspirin  chewable 81 milliGRAM(s) Oral daily  carvedilol 3.125 milliGRAM(s) Oral every 12 hours  docusate sodium 100 milliGRAM(s) Oral two times a day  enoxaparin Injectable 40 milliGRAM(s) SubCutaneous at bedtime  lisinopril 20 milliGRAM(s) Oral daily  multivitamin 1 Tablet(s) Oral daily  oxyCODONE    IR 5 milliGRAM(s) Oral every 4 hours  senna 2 Tablet(s) Oral at bedtime  simvastatin 40 milliGRAM(s) Oral at bedtime        VITALS:  T(F): 96 (18 @ 14:33), Max: 98.8 (18 @ 20:52)  HR: 71 (18 @ 14:33)  BP: 127/64 (18 @ 14:33)  RR: 18 (18 @ 14:33)  SpO2: 93% (18 @ 23:06)  Wt(kg): --     @ :  -   @ 07:00  --------------------------------------------------------  IN: 240 mL / OUT: 1050 mL / NET: -810 mL     @ 07:  -   @ 07:00  --------------------------------------------------------  IN: 120 mL / OUT: 1051 mL / NET: -931 mL     @ 07:01  -   @ 14:53  --------------------------------------------------------  IN: 0 mL / OUT: 601 mL / NET: -601 mL          LABS:      140  |  101  |  13  ----------------------------<  146<H>  4.3   |  22  |  0.6<L>    Ca    9.1      2018 09:43  Mg     2.0         TPro  6.1  /  Alb  3.5  /  TBili  0.5  /  DBili      /  AST  29  /  ALT  33  /  AlkPhos  51                            13.5   9.72  )-----------( 220      ( 2018 09:43 )             40.9       Urine Studies:        RADIOLOGY & ADDITIONAL STUDIES:

## 2018-07-31 NOTE — PROGRESS NOTE ADULT - SUBJECTIVE AND OBJECTIVE BOX
Patient was seen and examined. Spoke with RN. Chart reviewed.    No events overnight.  Vital Signs Last 24 Hrs  T(F): 96.6 (31 Jul 2018 05:53), Max: 98.8 (30 Jul 2018 20:52)  HR: 73 (31 Jul 2018 05:53) (71 - 73)  BP: 145/70 (31 Jul 2018 05:53) (122/58 - 145/70)  SpO2: 93% (30 Jul 2018 23:06) (93% - 93%)  MEDICATIONS  (STANDING):  aspirin  chewable 81 milliGRAM(s) Oral daily  carvedilol 3.125 milliGRAM(s) Oral every 12 hours  docusate sodium 100 milliGRAM(s) Oral two times a day  enoxaparin Injectable 40 milliGRAM(s) SubCutaneous at bedtime  lisinopril 20 milliGRAM(s) Oral daily  multivitamin 1 Tablet(s) Oral daily  oxyCODONE    IR 5 milliGRAM(s) Oral every 4 hours  senna 2 Tablet(s) Oral at bedtime  simvastatin 40 milliGRAM(s) Oral at bedtime    MEDICATIONS  (PRN):  acetaminophen   Tablet 650 milliGRAM(s) Oral every 6 hours PRN pain  magnesium hydroxide Suspension 30 milliLiter(s) Oral daily PRN Constipation    Labs:                        13.5   9.72  )-----------( 220      ( 31 Jul 2018 09:43 )             40.9                         12.7   8.61  )-----------( 199      ( 30 Jul 2018 08:18 )             38.5     31 Jul 2018 09:43    140    |  101    |  13     ----------------------------<  146    4.3     |  22     |  0.6    30 Jul 2018 08:18    140    |  102    |  13     ----------------------------<  134    4.0     |  25     |  0.6      Ca    9.1        31 Jul 2018 09:43  Ca    9.2        30 Jul 2018 08:18  Mg     2.0       31 Jul 2018 09:43    TPro  6.1    /  Alb  3.5    /  TBili  0.5    /  DBili  x      /  AST  29     /  ALT  33     /  AlkPhos  51     31 Jul 2018 09:43            Radiology:    General: comfortable, NAD  Neurology: A&Ox3, nonfocal  Head:  Normocephalic, atraumatic  ENT:  Mucosa moist, no ulcerations  Neck:  Supple, no JVD,   Skin: no breakdowns (as per RN)  Resp: CTA B/L  CV: RRR, S1S2,   GI: Soft, NT, bowel sounds  MS: No edema, + peripheral pulses, FROM all 4 extremity

## 2018-07-31 NOTE — PROGRESS NOTE ADULT - SUBJECTIVE AND OBJECTIVE BOX
Patient seen / chart reviewed        afebrile         vss   function : transfer with min / cg                      ambulate 30' with RW / cg /tlso

## 2018-08-01 ENCOUNTER — TRANSCRIPTION ENCOUNTER (OUTPATIENT)
Age: 83
End: 2018-08-01

## 2018-08-01 LAB
ANION GAP SERPL CALC-SCNC: 13 MMOL/L — SIGNIFICANT CHANGE UP (ref 7–14)
BASOPHILS # BLD AUTO: 0.03 K/UL — SIGNIFICANT CHANGE UP (ref 0–0.2)
BASOPHILS NFR BLD AUTO: 0.4 % — SIGNIFICANT CHANGE UP (ref 0–1)
BUN SERPL-MCNC: 16 MG/DL — SIGNIFICANT CHANGE UP (ref 10–20)
CALCIUM SERPL-MCNC: 9 MG/DL — SIGNIFICANT CHANGE UP (ref 8.5–10.1)
CHLORIDE SERPL-SCNC: 101 MMOL/L — SIGNIFICANT CHANGE UP (ref 98–110)
CO2 SERPL-SCNC: 24 MMOL/L — SIGNIFICANT CHANGE UP (ref 17–32)
CREAT SERPL-MCNC: 0.6 MG/DL — LOW (ref 0.7–1.5)
EOSINOPHIL # BLD AUTO: 0.29 K/UL — SIGNIFICANT CHANGE UP (ref 0–0.7)
EOSINOPHIL NFR BLD AUTO: 3.8 % — SIGNIFICANT CHANGE UP (ref 0–8)
GLUCOSE SERPL-MCNC: 120 MG/DL — HIGH (ref 70–99)
HCT VFR BLD CALC: 37.3 % — SIGNIFICANT CHANGE UP (ref 37–47)
HGB BLD-MCNC: 12.4 G/DL — SIGNIFICANT CHANGE UP (ref 12–16)
IMM GRANULOCYTES NFR BLD AUTO: 0.5 % — HIGH (ref 0.1–0.3)
LYMPHOCYTES # BLD AUTO: 2.19 K/UL — SIGNIFICANT CHANGE UP (ref 1.2–3.4)
LYMPHOCYTES # BLD AUTO: 29 % — SIGNIFICANT CHANGE UP (ref 20.5–51.1)
MAGNESIUM SERPL-MCNC: 2.1 MG/DL — SIGNIFICANT CHANGE UP (ref 1.8–2.4)
MCHC RBC-ENTMCNC: 28.8 PG — SIGNIFICANT CHANGE UP (ref 27–31)
MCHC RBC-ENTMCNC: 33.2 G/DL — SIGNIFICANT CHANGE UP (ref 32–37)
MCV RBC AUTO: 86.5 FL — SIGNIFICANT CHANGE UP (ref 81–99)
MONOCYTES # BLD AUTO: 0.85 K/UL — HIGH (ref 0.1–0.6)
MONOCYTES NFR BLD AUTO: 11.3 % — HIGH (ref 1.7–9.3)
NEUTROPHILS # BLD AUTO: 4.14 K/UL — SIGNIFICANT CHANGE UP (ref 1.4–6.5)
NEUTROPHILS NFR BLD AUTO: 55 % — SIGNIFICANT CHANGE UP (ref 42.2–75.2)
NRBC # BLD: 0 /100 WBCS — SIGNIFICANT CHANGE UP (ref 0–0)
PLATELET # BLD AUTO: 209 K/UL — SIGNIFICANT CHANGE UP (ref 130–400)
POTASSIUM SERPL-MCNC: 3.9 MMOL/L — SIGNIFICANT CHANGE UP (ref 3.5–5)
POTASSIUM SERPL-SCNC: 3.9 MMOL/L — SIGNIFICANT CHANGE UP (ref 3.5–5)
RBC # BLD: 4.31 M/UL — SIGNIFICANT CHANGE UP (ref 4.2–5.4)
RBC # FLD: 14.2 % — SIGNIFICANT CHANGE UP (ref 11.5–14.5)
SODIUM SERPL-SCNC: 138 MMOL/L — SIGNIFICANT CHANGE UP (ref 135–146)
WBC # BLD: 7.54 K/UL — SIGNIFICANT CHANGE UP (ref 4.8–10.8)
WBC # FLD AUTO: 7.54 K/UL — SIGNIFICANT CHANGE UP (ref 4.8–10.8)

## 2018-08-01 RX ORDER — ACETAMINOPHEN 500 MG
2 TABLET ORAL
Qty: 0 | Refills: 0 | COMMUNITY
Start: 2018-08-01

## 2018-08-01 RX ORDER — ENOXAPARIN SODIUM 100 MG/ML
40 INJECTION SUBCUTANEOUS
Qty: 0 | Refills: 0 | COMMUNITY
Start: 2018-08-01

## 2018-08-01 RX ORDER — SIMVASTATIN 20 MG/1
1 TABLET, FILM COATED ORAL
Qty: 0 | Refills: 0 | DISCHARGE
Start: 2018-08-01

## 2018-08-01 RX ORDER — CARVEDILOL PHOSPHATE 80 MG/1
1 CAPSULE, EXTENDED RELEASE ORAL
Qty: 0 | Refills: 0 | COMMUNITY
Start: 2018-08-01

## 2018-08-01 RX ORDER — SIMVASTATIN 20 MG/1
1 TABLET, FILM COATED ORAL
Qty: 0 | Refills: 0 | COMMUNITY

## 2018-08-01 RX ORDER — ASPIRIN/CALCIUM CARB/MAGNESIUM 324 MG
1 TABLET ORAL
Qty: 0 | Refills: 0 | DISCHARGE
Start: 2018-08-01

## 2018-08-01 RX ORDER — LISINOPRIL 2.5 MG/1
1 TABLET ORAL
Qty: 0 | Refills: 0 | COMMUNITY
Start: 2018-08-01

## 2018-08-01 RX ORDER — ACETAMINOPHEN 500 MG
1 TABLET ORAL
Qty: 0 | Refills: 0 | COMMUNITY

## 2018-08-01 RX ORDER — MELOXICAM 15 MG/1
1 TABLET ORAL
Qty: 0 | Refills: 0 | COMMUNITY

## 2018-08-01 RX ORDER — OXYCODONE HYDROCHLORIDE 5 MG/1
5 TABLET ORAL EVERY 6 HOURS
Qty: 0 | Refills: 0 | Status: DISCONTINUED | OUTPATIENT
Start: 2018-08-01 | End: 2018-08-03

## 2018-08-01 RX ORDER — ACETAMINOPHEN 500 MG
2 TABLET ORAL
Qty: 0 | Refills: 0 | DISCHARGE
Start: 2018-08-01

## 2018-08-01 RX ORDER — ASPIRIN/CALCIUM CARB/MAGNESIUM 324 MG
1 TABLET ORAL
Qty: 0 | Refills: 0 | COMMUNITY

## 2018-08-01 RX ADMIN — SENNA PLUS 2 TABLET(S): 8.6 TABLET ORAL at 22:27

## 2018-08-01 RX ADMIN — CARVEDILOL PHOSPHATE 3.12 MILLIGRAM(S): 80 CAPSULE, EXTENDED RELEASE ORAL at 17:31

## 2018-08-01 RX ADMIN — Medication 100 MILLIGRAM(S): at 17:31

## 2018-08-01 RX ADMIN — ENOXAPARIN SODIUM 40 MILLIGRAM(S): 100 INJECTION SUBCUTANEOUS at 22:28

## 2018-08-01 RX ADMIN — Medication 650 MILLIGRAM(S): at 05:34

## 2018-08-01 RX ADMIN — SIMVASTATIN 40 MILLIGRAM(S): 20 TABLET, FILM COATED ORAL at 22:28

## 2018-08-01 RX ADMIN — LISINOPRIL 20 MILLIGRAM(S): 2.5 TABLET ORAL at 05:34

## 2018-08-01 RX ADMIN — Medication 650 MILLIGRAM(S): at 22:28

## 2018-08-01 RX ADMIN — Medication 1 TABLET(S): at 11:23

## 2018-08-01 RX ADMIN — Medication 100 MILLIGRAM(S): at 05:34

## 2018-08-01 RX ADMIN — Medication 81 MILLIGRAM(S): at 11:23

## 2018-08-01 RX ADMIN — CARVEDILOL PHOSPHATE 3.12 MILLIGRAM(S): 80 CAPSULE, EXTENDED RELEASE ORAL at 05:34

## 2018-08-01 NOTE — DISCHARGE NOTE ADULT - MEDICATION SUMMARY - MEDICATIONS TO STOP TAKING
I will STOP taking the medications listed below when I get home from the hospital:    lisinopril-hydroCHLOROthiazide 20 mg-25 mg oral tablet  -- 1 tab(s) by mouth once a day    aspirin 325 mg oral tablet  -- 1 tab(s) by mouth once a day I will STOP taking the medications listed below when I get home from the hospital:    lisinopril-hydroCHLOROthiazide 20 mg-25 mg oral tablet  -- 1 tab(s) by mouth once a day    aspirin 325 mg oral tablet  -- 1 tab(s) by mouth once a day    Mobic 15 mg oral tablet  -- 1 tab(s) by mouth once a day, As Needed

## 2018-08-01 NOTE — DISCHARGE NOTE ADULT - PATIENT PORTAL LINK FT
You can access the [facility] Patient Portal, offered by [facility], by registering with the following website: [email or web address]

## 2018-08-01 NOTE — DISCHARGE NOTE ADULT - SECONDARY DIAGNOSIS.
Compression fracture of body of thoracic vertebra Hypertension Pulmonary nodule seen on imaging study NSTEMI (non-ST elevated myocardial infarction)

## 2018-08-01 NOTE — DISCHARGE NOTE ADULT - FUNCTIONAL SCREEN CURRENT LEVEL: AMBULATION, MLM
(3) assistive equipment and person Please have someone beside you when you ambulate and take the assistance of a walker/(3) assistive equipment and person

## 2018-08-01 NOTE — PROGRESS NOTE ADULT - SUBJECTIVE AND OBJECTIVE BOX
NEPHROLOGY FOLLOW UP:    pt seen and examined  awaiting 4A  still with back pain and hip pain  toelrating PT  no cp, fever, sob  bp's fair      PAST MEDICAL & SURGICAL HISTORY:  Arthritis  High cholesterol  Hypertension  History of   S/P cholecystectomy    Allergies:  No Known Allergies    Home Medications Reviewed    SOCIAL HISTORY:  Denies ETOH,Smoking,   FAMILY HISTORY:  No pertinent family history in first degree relatives    REVIEW OF SYSTEMS:  All other review of systems is negative unless indicated above.    PHYSICAL EXAM:  NAD  awake and alert  dry mm  no jvd  cta bl  rrr  soft, nt  no edema    Hospital Medications:   MEDICATIONS  (STANDING):  aspirin  chewable 81 milliGRAM(s) Oral daily  carvedilol 3.125 milliGRAM(s) Oral every 12 hours  docusate sodium 100 milliGRAM(s) Oral two times a day  enoxaparin Injectable 40 milliGRAM(s) SubCutaneous at bedtime  lisinopril 20 milliGRAM(s) Oral daily  multivitamin 1 Tablet(s) Oral daily  senna 2 Tablet(s) Oral at bedtime  simvastatin 40 milliGRAM(s) Oral at bedtime        VITALS:  T(F): 99.3 (18 @ 13:29), Max: 99.3 (18 @ 13:29)  HR: 72 (18 @ 13:29)  BP: 127/60 (18 @ 13:29)  RR: 18 (18 @ 13:29)  SpO2: 95% (18 @ 01:58)  Wt(kg): --     @ :  -   @ 07:00  --------------------------------------------------------  IN: 120 mL / OUT: 1051 mL / NET: -931 mL     @ 07:01  -  08 @ 07:00  --------------------------------------------------------  IN: 0 mL / OUT: 602 mL / NET: -602 mL     @ 07:01  -   @ 18:12  --------------------------------------------------------  IN: 930 mL / OUT: 1001 mL / NET: -71 mL          LABS:      138  |  101  |  16  ----------------------------<  120<H>  3.9   |  24  |  0.6<L>    Ca    9.0      01 Aug 2018 05:40  Mg     2.1         TPro  6.1  /  Alb  3.5  /  TBili  0.5  /  DBili      /  AST  29  /  ALT  33  /  AlkPhos  51                            12.4   7.54  )-----------( 209      ( 01 Aug 2018 05:40 )             37.3       Urine Studies:        RADIOLOGY & ADDITIONAL STUDIES:

## 2018-08-01 NOTE — PROGRESS NOTE ADULT - ASSESSMENT
resolved electrolyte derangements  HTN   fall  pelvic fracture / vertebral fracture  pulm nodules  EF 30% / ischemic cmp / old AWMI    plan:    off HCTZ  cont lisinopril  cont coreg 6.125mg po bid  declining kyphoplasty  declining cardiac cath  TLSO brace  PT / Rehab  pain control  dc to 4A

## 2018-08-01 NOTE — DISCHARGE NOTE ADULT - CARE PROVIDER_API CALL
Paula Ochoa), Internal Medicine  [address]  [address]  [place] [address]  Phone: [phone]  Fax: [phone]    Austin Donnelly), Critical Care Medicine; Geriatric Medicine; Internal Medicine; Pulmonary Disease  [address]  [address]  [place] [address]  Phone: [phone]  Fax: [phone]    Cindy Perez), Surgical Physicians  [address]  [address]  [place] [address]  Phone: [phone]  Fax: [phone]    Yared Kimbrough), Orthopaedic Surgery  [address] [place] [address]  Phone: [phone]  Fax: [phone]

## 2018-08-01 NOTE — DISCHARGE NOTE ADULT - VISION (WITH CORRECTIVE LENSES IF THE PATIENT USUALLY WEARS THEM):
uses glasses for reading/Partially impaired: cannot see medication labels or newsprint, but can see obstacles in path, and the surrounding layout; can count fingers at arm's length

## 2018-08-01 NOTE — DISCHARGE NOTE ADULT - REASON FOR ADMISSION
s/p fall ,transferred for [facility] for trauma work up  found to have T 12 and pelvic fracture

## 2018-08-01 NOTE — DISCHARGE NOTE ADULT - ABILITY TO HEAR (WITH HEARING AID OR HEARING APPLIANCE IF NORMALLY USED):
Mildly to Moderately Impaired: difficulty hearing in some environments or speaker may need to increase volume or speak distinctly/has hearing aids at bedside

## 2018-08-01 NOTE — DISCHARGE NOTE ADULT - CARE PLAN
Principal Discharge DX:	Pubic ramus fracture  Assessment and plan of treatment:	You were admitted after fall  Secondary Diagnosis:	Compression fracture of body of thoracic vertebra  Secondary Diagnosis:	Hypertension  Secondary Diagnosis:	Pulmonary nodule seen on imaging study  Secondary Diagnosis:	NSTEMI (non-ST elevated myocardial infarction) Principal Discharge DX:	Pubic ramus fracture  Goal:	Treat appropriately without complications  Assessment and plan of treatment:	You were admitted after fall in [place] and was found to have nondisplaced left inferior and superior pubic rami fractures.  Secondary Diagnosis:	Compression fracture of body of thoracic vertebra  Assessment and plan of treatment:	T12 compression fracture  Secondary Diagnosis:	Hypertension  Secondary Diagnosis:	Pulmonary nodule seen on imaging study  Secondary Diagnosis:	NSTEMI (non-ST elevated myocardial infarction) Principal Discharge DX:	Pubic ramus fracture  Goal:	Treat appropriately without complications  Assessment and plan of treatment:	You were admitted after fall in [place] and was found to have acute, nondisplaced left inferior pubic ramus and left acetabular column fractures.  Secondary Diagnosis:	Compression fracture of body of thoracic vertebra  Goal:	Treat appropriately without complications  Assessment and plan of treatment:	New severe T12 vertebral body compression fracture  Secondary Diagnosis:	Hypertension  Goal:	Treat appropriately without complications  Secondary Diagnosis:	Pulmonary nodule seen on imaging study  Goal:	Treat appropriately without complications  Assessment and plan of treatment:	Multiple left lower lobe pulmonary nodules,  Secondary Diagnosis:	NSTEMI (non-ST elevated myocardial infarction)  Goal:	Treat appropriately without complications Principal Discharge DX:	Pubic ramus fracture  Goal:	Treat appropriately without complications  Assessment and plan of treatment:	You were admitted after fall in [place] and was found to have acute, nondisplaced left inferior pubic ramus and left acetabular column fractures.  Secondary Diagnosis:	Compression fracture of body of thoracic vertebra  Goal:	Treat appropriately without complications  Assessment and plan of treatment:	New severe T12 vertebral body compression fracture  Secondary Diagnosis:	Hypertension  Goal:	Treat appropriately without complications  Assessment and plan of treatment:	take medications as prescribed and follow up with your primary care doctor. Please stop using hydrochlorothiazide (HCTZ)  Secondary Diagnosis:	Pulmonary nodule seen on imaging study  Goal:	Treat appropriately without complications  Assessment and plan of treatment:	Multiple left lower lobe pulmonary nodules,  Secondary Diagnosis:	NSTEMI (non-ST elevated myocardial infarction)  Goal:	Treat appropriately without complications Principal Discharge DX:	Pubic ramus fracture  Goal:	Treat appropriately without complications  Assessment and plan of treatment:	You were admitted after fall in [place] and was found to have acute, nondisplaced left inferior pubic ramus and left acetabular column fractures. It was immobilized with TLSO brace. Please use it as explained. follow up with orthopedics after discharge as outpatient.  Secondary Diagnosis:	Compression fracture of body of thoracic vertebra  Goal:	Treat appropriately without complications  Assessment and plan of treatment:	New severe T12 vertebral body compression fracture. You underwent kyphoplasty on 8/3/2018. No complications after the procedure. Follow up with neurosurgery after discharge.  Secondary Diagnosis:	Hypertension  Goal:	Treat appropriately without complications  Assessment and plan of treatment:	take medications as prescribed and follow up with your primary care doctor. Please stop using hydrochlorothiazide (HCTZ)  Secondary Diagnosis:	Pulmonary nodule seen on imaging study  Goal:	Treat appropriately without complications  Assessment and plan of treatment:	Multiple left lower lobe pulmonary nodules. Follow up with , pulmonologist in 6months as outpatient.  Secondary Diagnosis:	NSTEMI (non-ST elevated myocardial infarction)  Goal:	Treat appropriately without complications  Assessment and plan of treatment:	You presented with elevated cardiac enzymes and normal EKG. Dr.Jennifer Ochoa, Cardiologist suggested you to have a cath but you refused to have it now. Please follow up with her as outpatient to monitor your condition. Principal Discharge DX:	Pubic ramus fracture  Goal:	Treat appropriately without complications  Assessment and plan of treatment:	You were admitted after fall in [place] and was found to have acute, nondisplaced left inferior pubic ramus and left acetabular column fractures. follow up with orthopedics after discharge as outpatient.  Secondary Diagnosis:	Compression fracture of body of thoracic vertebra  Goal:	Treat appropriately without complications  Assessment and plan of treatment:	New severe T12 vertebral body compression fracture. You underwent kyphoplasty on 8/3/2018. No complications after the procedure. Follow up with neurosurgery after discharge.  Secondary Diagnosis:	Hypertension  Goal:	Treat appropriately without complications  Assessment and plan of treatment:	take medications as prescribed and follow up with your primary care doctor. Please stop using hydrochlorothiazide (HCTZ)  Secondary Diagnosis:	Pulmonary nodule seen on imaging study  Goal:	Treat appropriately without complications  Assessment and plan of treatment:	Multiple left lower lobe pulmonary nodules. Follow up with , pulmonologist in 6months as outpatient.  Secondary Diagnosis:	NSTEMI (non-ST elevated myocardial infarction)  Goal:	Treat appropriately without complications  Assessment and plan of treatment:	You presented with elevated cardiac enzymes and normal EKG. Dr.Jennifer Ochoa, Cardiologist suggested you to have a cath but you refused to have it now. Please follow up with her as outpatient to monitor your condition.

## 2018-08-01 NOTE — DISCHARGE NOTE ADULT - CARE PROVIDERS DIRECT ADDRESSES
[email or web address]

## 2018-08-01 NOTE — DISCHARGE NOTE ADULT - PLAN OF CARE
You were admitted after fall Treat appropriately without complications You were admitted after fall in [place] and was found to have nondisplaced left inferior and superior pubic rami fractures. T12 compression fracture You were admitted after fall in [place] and was found to have acute, nondisplaced left inferior pubic ramus and left acetabular column fractures. New severe T12 vertebral body compression fracture Multiple left lower lobe pulmonary nodules, take medications as prescribed and follow up with your primary care doctor. Please stop using hydrochlorothiazide (HCTZ) You were admitted after fall in [place] and was found to have acute, nondisplaced left inferior pubic ramus and left acetabular column fractures. It was immobilized with TLSO brace. Please use it as explained. follow up with orthopedics after discharge as outpatient. New severe T12 vertebral body compression fracture. You underwent kyphoplasty on 8/3/2018. No complications after the procedure. Follow up with neurosurgery after discharge. Multiple left lower lobe pulmonary nodules. Follow up with , pulmonologist in 6months as outpatient. You presented with elevated cardiac enzymes and normal EKG. Dr.Jennifer Ochoa, Cardiologist suggested you to have a cath but you refused to have it now. Please follow up with her as outpatient to monitor your condition. You were admitted after fall in [place] and was found to have acute, nondisplaced left inferior pubic ramus and left acetabular column fractures. follow up with orthopedics after discharge as outpatient.

## 2018-08-01 NOTE — PROGRESS NOTE ADULT - SUBJECTIVE AND OBJECTIVE BOX
LOW GUZMÁN 89y Female  MRN#: [id number]   CODE STATUS:________      SUBJECTIVE  Patient is a 89y old Female who presents with a chief complaint of s/p fall ,transferred fro [facility] for trauma work up  found to have T 12 and pelvic fracture (2018 06:13)  Currently admitted to medicine with the primary diagnosis of Pubic ramus fracture  Hospital course has been complicated by _______.   Today is hospital day 5d, and this morning she is _________ and reports ________ overnight events.     Present Today:           Taveras Catheter ()No/ ()Yes? Indication:          Central Line ()No/ ()Yes? Indication:          IV Fluids ()No/ ()Yes? Type:  Rate:  Indication:      OBJECTIVE  PAST MEDICAL & SURGICAL HISTORY  Arthritis  High cholesterol  Hypertension  History of   S/P cholecystectomy    ALLERGIES:  No Known Allergies    MEDICATIONS:  STANDING MEDICATIONS  aspirin  chewable 81 milliGRAM(s) Oral daily  carvedilol 3.125 milliGRAM(s) Oral every 12 hours  docusate sodium 100 milliGRAM(s) Oral two times a day  enoxaparin Injectable 40 milliGRAM(s) SubCutaneous at bedtime  lisinopril 20 milliGRAM(s) Oral daily  multivitamin 1 Tablet(s) Oral daily  senna 2 Tablet(s) Oral at bedtime  simvastatin 40 milliGRAM(s) Oral at bedtime    PRN MEDICATIONS  acetaminophen   Tablet 650 milliGRAM(s) Oral every 6 hours PRN  magnesium hydroxide Suspension 30 milliLiter(s) Oral daily PRN  oxyCODONE    IR 5 milliGRAM(s) Oral every 6 hours PRN      VITAL SIGNS: Last 24 Hours  T(C): 36.7 (01 Aug 2018 05:49), Max: 36.7 (2018 21:04)  T(F): 98 (01 Aug 2018 05:49), Max: 98.1 (2018 21:04)  HR: 67 (01 Aug 2018 05:49) (67 - 76)  BP: 151/71 (01 Aug 2018 05:49) (114/62 - 151/71)  BP(mean): --  RR: 18 (01 Aug 2018 05:49) (18 - 18)  SpO2: 95% (01 Aug 2018 01:58) (95% - 95%)    LABS:                        12.4   7.54  )-----------( 209      ( 01 Aug 2018 05:40 )             37.3     08-    138  |  101  |  16  ----------------------------<  120<H>  3.9   |  24  |  0.6<L>    Ca    9.0      01 Aug 2018 05:40  Mg     2.1         TPro  6.1  /  Alb  3.5  /  TBili  0.5  /  DBili  x   /  AST  29  /  ALT  33  /  AlkPhos  51                    RADIOLOGY:      PHYSICAL EXAM:    GENERAL: NAD, well-developed, AAOx3  HEENT:  Atraumatic, Normocephalic. EOMI, PERRLA, conjunctiva and sclera clear, No JVD  PULMONARY: Clear to auscultation bilaterally; No wheeze  CARDIOVASCULAR: Regular rate and rhythm; No murmurs, rubs, or gallops  GASTROINTESTINAL: Soft, Nontender, Nondistended; Bowel sounds present  MUSCULOSKELETAL:  2+ Peripheral Pulses, No clubbing, cyanosis, or edema  NEUROLOGY: non-focal  SKIN: No rashes or lesions      ADMISSION SUMMARY  Patient is a 89y old Female who presents with a chief complaint of s/p fall ,transferred fro [facility] for trauma work up  found to have T 12 and pelvic fracture.Currently admitted to medicine with the primary diagnosis of Pubic ramus fracture    ASSESSMENT & PLAN  L acetabular fx and T 12 fx, s/p mechanical fall - stable  -cleared by trauma   -Weight bearing as tolerated as per ortho  -no intervention for pelvic fx as per ortho  -Neuro Surgery conservative tx  -c/w pain control: oxycodone and Tylenol PRN (limit NSAIDS)  -TLSO brace received, PT/rehab eval appreciated (c/w bedside therapy as tolerated 3hrs/day by PT/OT)    #Troponemia - stable  -ECHO showed AWMI and ischemic cardiomyopathy EF of 30%  -pt cancelled cath, Dr. Khoury aware and ok, no life vest is needed  -pt will f/u with her own cardiologist as outpatient  -continue medical therapy with ASA, statin, lisinopril, coreg 3.125mg BID  -Trop: 0.17 > 0.17, CK: 6.4 > 6.4    #Incidental pulmonary nodule found on CT  -Pulm: Pt will need a repeat CT chest and follow up with Dr. Donnelly in 6 months    #HTN  -Nephro: D/C HCTZ due to hyperkalemia  -c/w lisinopril   -added coreg 3.125 mg PO BID    #CAD/DLD;  -ASA 81mg, statin, lisinopril, coreg 3.125mg BID    #DVT Prophylaxis: cw lovenox    #Diet; DASH    #Planned Disposition: Rehab

## 2018-08-01 NOTE — DISCHARGE NOTE ADULT - MEDICATION SUMMARY - MEDICATIONS TO TAKE
I will START or STAY ON the medications listed below when I get home from the hospital:    aspirin 81 mg oral tablet, chewable  -- 1 tab(s) by mouth once a day  -- Indication: For NSTEMI (non-ST elevated myocardial infarction)    acetaminophen 325 mg oral tablet  -- 2 tab(s) by mouth every 6 hours, As needed, pain  -- Indication: For Compression fracture of body of thoracic vertebra    lisinopril 20 mg oral tablet  -- 1 tab(s) by mouth once a day  -- Indication: For Hypertension    enoxaparin  -- 40 milligram(s) subcutaneous once a day  -- Indication: For lower extremity DVT prophylaxis    simvastatin 40 mg oral tablet  -- 1 tab(s) by mouth once a day (at bedtime)  -- Indication: For NSTEMI (non-ST elevated myocardial infarction)    carvedilol 3.125 mg oral tablet  -- 1 tab(s) by mouth every 12 hours  -- Indication: For NSTEMI (non-ST elevated myocardial infarction)    Multiple Vitamins oral tablet  -- 1 tab(s) by mouth once a day  -- Indication: For Vitamins I will START or STAY ON the medications listed below when I get home from the hospital:    aspirin 81 mg oral tablet, chewable  -- 1 tab(s) by mouth once a day  -- Indication: For NSTEMI (non-ST elevated myocardial infarction)    acetaminophen 325 mg oral tablet  -- 2 tab(s) by mouth every 6 hours, As needed, pain  -- Indication: For Compression fracture of body of thoracic vertebra    oxyCODONE 5 mg oral tablet  -- 1 tab(s) by mouth every 6 hours, As needed, Severe Pain (7 - 10)  -- Indication: For PUBIC RAMUS FRACTURE;COMPRESSION FRACTURE OF BODY OF THORACIC VERTEBRA    lisinopril 20 mg oral tablet  -- 1 tab(s) by mouth once a day  -- Indication: For Hypertension    simvastatin 40 mg oral tablet  -- 1 tab(s) by mouth once a day (at bedtime)  -- Indication: For NSTEMI (non-ST elevated myocardial infarction)    carvedilol 3.125 mg oral tablet  -- 1 tab(s) by mouth every 12 hours  -- Indication: For NSTEMI (non-ST elevated myocardial infarction)    nystatin 100,000 units/g topical powder  -- 1 application on skin every 8 hours  -- Indication: For rash under breast    senna oral tablet  -- 2 tab(s) by mouth once a day (at bedtime), As Needed  -- Indication: For Constipation    docusate sodium 100 mg oral capsule  -- 1 cap(s) by mouth 2 times a day  -- Indication: For Constipation    Multiple Vitamins oral tablet  -- 1 tab(s) by mouth once a day  -- Indication: For Vitamins

## 2018-08-01 NOTE — PROGRESS NOTE ADULT - SUBJECTIVE AND OBJECTIVE BOX
89 F with Hx of HTN, HLD ,arthritis ,transferred from [facility] for trauma work up  after a mechanical fall 2 days ago ,found to have pelvic and T12 fx.    #L acetabular fx and T 12 fx, s/p mechanical fall - stable  -cleared by trauma   -Weight bearing as tolerated as per ortho  -no intervention for pelvic fx as per ortho  -Neuro Surgery conservative tx  -c/w pain control: oxycodone and Tylenol PRN (limit NSAIDS)  -TLSO brace received, PT/rehab eval appreciated (c/w bedside therapy as tolerated 3hrs/day by PT/OT)    #Troponemia - stable  -ECHO showed AWMI and ischemic cardiomyopathy EF of 30%  -pt cancelled cath, Dr. Khoury aware and ok, no life vest is needed  -pt will f/u w/ her own cardiologist as outpatient  -cont medical therapy with ASA, statin, lisinopril, coreg 3.125mg BID  -Trop: 0.17 > 0.17, CK: 6.4 > 6.4    #Incidental pulmonary nodule found on CT  -Pulm: Pt will need a repeat CT chest and follow up with Dr. Donnelly in 6 months    #HTN  -Nephro: D/C HCTZ due to hyperkalemia  -c/w lisinopril   -added coreg 3.125 mg PO BID    #CAD/DLD;  -ASA 81mg, statin, lisinopril, coreg 3.125mg BID    #Hypokalemia-resolved 3.7   -KCL prn    #DVT ppx; cw lovenox    #Diet; DASH    #Planned Disposition: Rehab

## 2018-08-01 NOTE — PROGRESS NOTE ADULT - ASSESSMENT
ASSESSMENT & PLAN  L acetabular fx and T 12 fx, s/p mechanical fall - stable  -cleared by trauma   -Weight bearing as tolerated as per ortho  -no intervention for pelvic fx as per ortho  -Neuro Surgery conservative tx  -c/w pain control: oxycodone and Tylenol PRN (limit NSAIDS)  -TLSO brace received, PT/rehab eval appreciated (c/w bedside therapy as tolerated 3hrs/day by PT/OT)    #Troponemia - stable  -ECHO showed AWMI and ischemic cardiomyopathy EF of 30%  -pt cancelled cath, Dr. Khoury aware and ok, no life vest is needed  -pt will f/u with her own cardiologist as outpatient  -continue medical therapy with ASA, statin, lisinopril, coreg 3.125mg BID  -Trop: 0.17 > 0.17, CK: 6.4 > 6.4    #Incidental pulmonary nodule found on CT  -Pulm: Pt will need a repeat CT chest and follow up with Dr. Donnelly in 6 months    #HTN  -Nephro: D/C HCTZ due to hyperkalemia  -c/w lisinopril   -added coreg 3.125 mg PO BID    #CAD/DLD;  -ASA 81mg, statin, lisinopril, coreg 3.125mg BID    #DVT Prophylaxis: cw lovenox    #Diet; DASH    #Planned Disposition: Rehab

## 2018-08-02 LAB
ANION GAP SERPL CALC-SCNC: 14 MMOL/L — SIGNIFICANT CHANGE UP (ref 7–14)
APTT BLD: 34.9 SEC — SIGNIFICANT CHANGE UP (ref 27–39.2)
BASOPHILS # BLD AUTO: 0.03 K/UL — SIGNIFICANT CHANGE UP (ref 0–0.2)
BASOPHILS NFR BLD AUTO: 0.4 % — SIGNIFICANT CHANGE UP (ref 0–1)
BUN SERPL-MCNC: 15 MG/DL — SIGNIFICANT CHANGE UP (ref 10–20)
CALCIUM SERPL-MCNC: 9 MG/DL — SIGNIFICANT CHANGE UP (ref 8.5–10.1)
CHLORIDE SERPL-SCNC: 103 MMOL/L — SIGNIFICANT CHANGE UP (ref 98–110)
CO2 SERPL-SCNC: 24 MMOL/L — SIGNIFICANT CHANGE UP (ref 17–32)
CREAT SERPL-MCNC: 0.6 MG/DL — LOW (ref 0.7–1.5)
EOSINOPHIL # BLD AUTO: 0.25 K/UL — SIGNIFICANT CHANGE UP (ref 0–0.7)
EOSINOPHIL NFR BLD AUTO: 3.6 % — SIGNIFICANT CHANGE UP (ref 0–8)
GLUCOSE SERPL-MCNC: 127 MG/DL — HIGH (ref 70–99)
HCT VFR BLD CALC: 36.3 % — LOW (ref 37–47)
HGB BLD-MCNC: 11.9 G/DL — LOW (ref 12–16)
IMM GRANULOCYTES NFR BLD AUTO: 0.4 % — HIGH (ref 0.1–0.3)
INR BLD: 1.24 RATIO — SIGNIFICANT CHANGE UP (ref 0.65–1.3)
LYMPHOCYTES # BLD AUTO: 1.7 K/UL — SIGNIFICANT CHANGE UP (ref 1.2–3.4)
LYMPHOCYTES # BLD AUTO: 24.3 % — SIGNIFICANT CHANGE UP (ref 20.5–51.1)
MCHC RBC-ENTMCNC: 28.6 PG — SIGNIFICANT CHANGE UP (ref 27–31)
MCHC RBC-ENTMCNC: 32.8 G/DL — SIGNIFICANT CHANGE UP (ref 32–37)
MCV RBC AUTO: 87.3 FL — SIGNIFICANT CHANGE UP (ref 81–99)
MONOCYTES # BLD AUTO: 0.67 K/UL — HIGH (ref 0.1–0.6)
MONOCYTES NFR BLD AUTO: 9.6 % — HIGH (ref 1.7–9.3)
NEUTROPHILS # BLD AUTO: 4.31 K/UL — SIGNIFICANT CHANGE UP (ref 1.4–6.5)
NEUTROPHILS NFR BLD AUTO: 61.7 % — SIGNIFICANT CHANGE UP (ref 42.2–75.2)
NRBC # BLD: 0 /100 WBCS — SIGNIFICANT CHANGE UP (ref 0–0)
PLATELET # BLD AUTO: 205 K/UL — SIGNIFICANT CHANGE UP (ref 130–400)
POTASSIUM SERPL-MCNC: 4 MMOL/L — SIGNIFICANT CHANGE UP (ref 3.5–5)
POTASSIUM SERPL-SCNC: 4 MMOL/L — SIGNIFICANT CHANGE UP (ref 3.5–5)
PROTHROM AB SERPL-ACNC: 13.4 SEC — HIGH (ref 9.95–12.87)
RBC # BLD: 4.16 M/UL — LOW (ref 4.2–5.4)
RBC # FLD: 14.3 % — SIGNIFICANT CHANGE UP (ref 11.5–14.5)
SODIUM SERPL-SCNC: 141 MMOL/L — SIGNIFICANT CHANGE UP (ref 135–146)
WBC # BLD: 6.99 K/UL — SIGNIFICANT CHANGE UP (ref 4.8–10.8)
WBC # FLD AUTO: 6.99 K/UL — SIGNIFICANT CHANGE UP (ref 4.8–10.8)

## 2018-08-02 RX ADMIN — CARVEDILOL PHOSPHATE 3.12 MILLIGRAM(S): 80 CAPSULE, EXTENDED RELEASE ORAL at 17:09

## 2018-08-02 RX ADMIN — CARVEDILOL PHOSPHATE 3.12 MILLIGRAM(S): 80 CAPSULE, EXTENDED RELEASE ORAL at 05:35

## 2018-08-02 RX ADMIN — LISINOPRIL 20 MILLIGRAM(S): 2.5 TABLET ORAL at 05:35

## 2018-08-02 RX ADMIN — Medication 1 TABLET(S): at 11:15

## 2018-08-02 RX ADMIN — Medication 81 MILLIGRAM(S): at 11:15

## 2018-08-02 RX ADMIN — ENOXAPARIN SODIUM 40 MILLIGRAM(S): 100 INJECTION SUBCUTANEOUS at 21:36

## 2018-08-02 RX ADMIN — Medication 100 MILLIGRAM(S): at 17:09

## 2018-08-02 RX ADMIN — SENNA PLUS 2 TABLET(S): 8.6 TABLET ORAL at 21:35

## 2018-08-02 RX ADMIN — SIMVASTATIN 40 MILLIGRAM(S): 20 TABLET, FILM COATED ORAL at 21:35

## 2018-08-02 RX ADMIN — Medication 650 MILLIGRAM(S): at 21:35

## 2018-08-02 RX ADMIN — Medication 100 MILLIGRAM(S): at 05:35

## 2018-08-02 NOTE — PROGRESS NOTE ADULT - SUBJECTIVE AND OBJECTIVE BOX
spoke with the resident we put the patient tentatively on the schedule for tomorrow for Kyphoplasty pending clearance

## 2018-08-02 NOTE — PROGRESS NOTE ADULT - SUBJECTIVE AND OBJECTIVE BOX
NEPHROLOGY FOLLOW UP:    pt seen and examined  insurance denied 4a  still with back pain and hip pain  now wants kyphoplasty  spoke with daughter, physiatry, her PMD and resident    PAST MEDICAL & SURGICAL HISTORY:  Arthritis  High cholesterol  Hypertension  History of   S/P cholecystectomy    Allergies:  No Known Allergies    Home Medications Reviewed    SOCIAL HISTORY:  Denies ETOH,Smoking,     FAMILY HISTORY:  No pertinent family history in first degree relatives    REVIEW OF SYSTEMS:  All other review of systems is negative unless indicated above.    PHYSICAL EXAM:  NAD  awake and alert  dry mm  no jvd  cta bl  rrr  soft, nt  no edema    Hospital Medications:   MEDICATIONS  (STANDING):  aspirin  chewable 81 milliGRAM(s) Oral daily  carvedilol 3.125 milliGRAM(s) Oral every 12 hours  docusate sodium 100 milliGRAM(s) Oral two times a day  enoxaparin Injectable 40 milliGRAM(s) SubCutaneous at bedtime  lisinopril 20 milliGRAM(s) Oral daily  multivitamin 1 Tablet(s) Oral daily  senna 2 Tablet(s) Oral at bedtime  simvastatin 40 milliGRAM(s) Oral at bedtime        VITALS:  T(F): 97.4 (18 @ 12:51), Max: 98.6 (18 @ 20:00)  HR: 68 (18 @ 12:51)  BP: 131/67 (18 @ 12:51)  RR: 18 (18 @ 12:51)  SpO2: 95% (18 @ 19:55)  Wt(kg): --     @ :  -   @ 07:00  --------------------------------------------------------  IN: 0 mL / OUT: 602 mL / NET: -602 mL     @ 07:  -   @ 07:00  --------------------------------------------------------  IN: 930 mL / OUT: 1301 mL / NET: -371 mL     @ 07:01  -   @ 17:08  --------------------------------------------------------  IN: 0 mL / OUT: 1 mL / NET: -1 mL          LABS:      141  |  103  |  15  ----------------------------<  127<H>  4.0   |  24  |  0.6<L>    Ca    9.0      02 Aug 2018 07:03  Mg     2.1                                 11.9   6.99  )-----------( 205      ( 02 Aug 2018 07:03 )             36.3       Urine Studies:        RADIOLOGY & ADDITIONAL STUDIES:

## 2018-08-02 NOTE — PRE-ANESTHESIA EVALUATION ADULT - LAST ECHOCARDIOGRAM
Left Ventricle: The left ventricular internal cavity size is normal. Left   ventricular wall thickness is normal. Global LV systolic function was   moderately to severely decreased. Left ventricular ejection fraction, by   visual estimation, is 30%. Spectral Doppler shows impaired relaxation   pattern of left ventricular myocardial filling (Grade I diastolic   dysfunction).

## 2018-08-02 NOTE — PRE-ANESTHESIA EVALUATION ADULT - NSANTHOSAYNRD_GEN_A_CORE
No. JAYLIN screening performed.  STOP BANG Legend: 0-2 = LOW Risk; 3-4 = INTERMEDIATE Risk; 5-8 = HIGH Risk

## 2018-08-02 NOTE — PROGRESS NOTE ADULT - SUBJECTIVE AND OBJECTIVE BOX
Patient was seen and examined. Spoke with RN. Chart reviewed.    No events overnight.  Vital Signs Last 24 Hrs  T(F): 97.3 (02 Aug 2018 05:53), Max: 99.3 (01 Aug 2018 13:29)  HR: 65 (02 Aug 2018 05:53) (65 - 77)  BP: 115/57 (02 Aug 2018 05:53) (115/57 - 134/66)  SpO2: 95% (01 Aug 2018 19:55) (95% - 95%)  MEDICATIONS  (STANDING):  aspirin  chewable 81 milliGRAM(s) Oral daily  carvedilol 3.125 milliGRAM(s) Oral every 12 hours  docusate sodium 100 milliGRAM(s) Oral two times a day  enoxaparin Injectable 40 milliGRAM(s) SubCutaneous at bedtime  lisinopril 20 milliGRAM(s) Oral daily  multivitamin 1 Tablet(s) Oral daily  senna 2 Tablet(s) Oral at bedtime  simvastatin 40 milliGRAM(s) Oral at bedtime    MEDICATIONS  (PRN):  acetaminophen   Tablet 650 milliGRAM(s) Oral every 6 hours PRN pain  magnesium hydroxide Suspension 30 milliLiter(s) Oral daily PRN Constipation  oxyCODONE    IR 5 milliGRAM(s) Oral every 6 hours PRN Severe Pain (7 - 10)    Labs:                        11.9   6.99  )-----------( 205      ( 02 Aug 2018 07:03 )             36.3                         12.4   7.54  )-----------( 209      ( 01 Aug 2018 05:40 )             37.3     02 Aug 2018 07:03    141    |  103    |  15     ----------------------------<  127    4.0     |  24     |  0.6    01 Aug 2018 05:40    138    |  101    |  16     ----------------------------<  120    3.9     |  24     |  0.6      Ca    9.0        02 Aug 2018 07:03  Ca    9.0        01 Aug 2018 05:40  Mg     2.1       01 Aug 2018 05:40              Radiology:    General: comfortable, NAD  Neurology: A&Ox3, nonfocal  Head:  Normocephalic, atraumatic  ENT:  Mucosa moist, no ulcerations  Neck:  Supple, no JVD,   Skin: no breakdowns (as per RN)  Resp: CTA B/L  CV: RRR, S1S2,   GI: Soft, NT, bowel sounds  MS: No edema, + peripheral pulses, FROM all 4 extremity

## 2018-08-02 NOTE — PROGRESS NOTE ADULT - ASSESSMENT
L acetabular fx and T 12 fx, s/p mechanical fall - stable  -cleared by trauma   -Weight bearing as tolerated as per ortho  -no intervention for pelvic fx as per ortho  -Neuro Surgery conservative tx  -c/w pain control: oxycodone and Tylenol PRN (limit NSAIDS)  -TLSO brace received, PT/rehab eval appreciated (c/w bedside therapy as tolerated 3hrs/day by PT/OT)    #Troponemia - stable  -ECHO showed AWMI and ischemic cardiomyopathy EF of 30%  -pt cancelled cath, Dr. Khoury aware and ok, no life vest is needed  -pt will f/u with her own cardiologist as outpatient  -continue medical therapy with ASA, statin, lisinopril, coreg 3.125mg BID  -Trop: 0.17 > 0.17, CK: 6.4 > 6.4    #Incidental pulmonary nodule found on CT  -Pulm: Pt will need a repeat CT chest and follow up with Dr. Donnelly in 6 months    #HTN  -Nephro: D/C HCTZ due to hyperkalemia  -c/w lisinopril   -added coreg 3.125 mg PO BID    #CAD/DLD;  -ASA 81mg, statin, lisinopril, coreg 3.125mg BID    #DVT Prophylaxis: cw lovenox    #Diet; DASH    #Planned Disposition: Rehab inpt

## 2018-08-02 NOTE — PROGRESS NOTE ADULT - SUBJECTIVE AND OBJECTIVE BOX
LOW GUZMÁN 89y Female  MRN#: [id number]   CODE STATUS:________      SUBJECTIVE  Patient is a 89y old Female who presents with a chief complaint of s/p fall [place]  found to have T 12 and pelvic fracture (01 Aug 2018 15:05)  Currently admitted to medicine with the primary diagnosis of Pubic ramus fracture  Hospital course has been complicated by _______.   Today is hospital day 6d, and this morning she is _________ and reports ________ overnight events.     Present Today:           Taveras Catheter ()No/ ()Yes? Indication:          Central Line ()No/ ()Yes? Indication:          IV Fluids ()No/ ()Yes? Type:  Rate:  Indication:      OBJECTIVE  PAST MEDICAL & SURGICAL HISTORY  Arthritis  High cholesterol  Hypertension  History of   S/P cholecystectomy    ALLERGIES:  No Known Allergies    MEDICATIONS:  STANDING MEDICATIONS  aspirin  chewable 81 milliGRAM(s) Oral daily  carvedilol 3.125 milliGRAM(s) Oral every 12 hours  docusate sodium 100 milliGRAM(s) Oral two times a day  enoxaparin Injectable 40 milliGRAM(s) SubCutaneous at bedtime  lisinopril 20 milliGRAM(s) Oral daily  multivitamin 1 Tablet(s) Oral daily  senna 2 Tablet(s) Oral at bedtime  simvastatin 40 milliGRAM(s) Oral at bedtime    PRN MEDICATIONS  acetaminophen   Tablet 650 milliGRAM(s) Oral every 6 hours PRN  magnesium hydroxide Suspension 30 milliLiter(s) Oral daily PRN  oxyCODONE    IR 5 milliGRAM(s) Oral every 6 hours PRN      VITAL SIGNS: Last 24 Hours  T(C): 37.1 (02 Aug 2018 20:03), Max: 37.1 (02 Aug 2018 20:03)  T(F): 98.7 (02 Aug 2018 20:03), Max: 98.7 (02 Aug 2018 20:03)  HR: 67 (02 Aug 2018 20:03) (65 - 71)  BP: 126/59 (02 Aug 2018 20:03) (115/57 - 142/70)  BP(mean): --  RR: 18 (02 Aug 2018 20:03) (18 - 18)  SpO2: --    LABS:                        11.9   6.99  )-----------( 205      ( 02 Aug 2018 07:03 )             36.3     08-    141  |  103  |  15  ----------------------------<  127<H>  4.0   |  24  |  0.6<L>    Ca    9.0      02 Aug 2018 07:03  Mg     2.1     08-                    RADIOLOGY:      PHYSICAL EXAM:    GENERAL: NAD, well-developed, AAOx3  HEENT:  Atraumatic, Normocephalic. EOMI, PERRLA, conjunctiva and sclera clear, No JVD  PULMONARY: Clear to auscultation bilaterally; No wheeze  CARDIOVASCULAR: Regular rate and rhythm; No murmurs, rubs, or gallops  GASTROINTESTINAL: Soft, Nontender, Nondistended; Bowel sounds present  MUSCULOSKELETAL:  2+ Peripheral Pulses, No clubbing, cyanosis, or edema  NEUROLOGY: non-focal  SKIN: No rashes or lesions      ADMISSION SUMMARY  Patient is a 89y old Female who presents with a chief complaint of s/p fall ,transferred for [facility] for trauma work up  found to have T 12 and pelvic fracture   Currently admitted to medicine with the primary diagnosis of Pubic ramus fracture      ASSESSMENT & PLAN    L acetabular fx and T 12 fx, s/p mechanical fall - stable  -cleared by trauma   -Weight bearing as tolerated as per ortho  -no intervention for pelvic fx as per ortho  -Patient decided to have kyphoplasty .   -Neuro Surgery scheduled her for surgery tomorrow. Waiting for Cardiology clearance. Tried to reach Dr.Jennifer Ochoa for the clearance.  -c/w pain control: oxycodone and Tylenol PRN (limit NSAIDS)  -TLSO brace received, PT/rehab eval appreciated (c/w bedside therapy as tolerated 3hrs/day by PT/OT)    #Troponemia - stable  -ECHO showed AWMI and ischemic cardiomyopathy EF of 30%  -pt cancelled cath, Dr. Khoury aware and ok, no life vest is needed  -pt will f/u with her own cardiologist as outpatient  -continue medical therapy with ASA, statin, lisinopril, coreg 3.125mg BID  -Trop: 0.17 > 0.17, CK: 6.4 > 6.4    #Incidental pulmonary nodule found on CT  -Pulm: Pt will need a repeat CT chest and follow up with Dr. Donnelly in 6 months  #HTN  -Nephro: D/C HCTZ due to hypokalemia  -c/w lisinopril   -added coreg 3.125 mg PO BID    #CAD/DLD;  -ASA 81mg, statin, lisinopril, coreg 3.125mg BID    #DVT Prophylaxis: cw lovenox    #Diet; DASH    #Planned Disposition: Rehab

## 2018-08-03 ENCOUNTER — RESULT REVIEW (OUTPATIENT)
Age: 83
End: 2018-08-03

## 2018-08-03 LAB
ALBUMIN SERPL ELPH-MCNC: 3.2 G/DL — LOW (ref 3.5–5.2)
ALP SERPL-CCNC: 54 U/L — SIGNIFICANT CHANGE UP (ref 30–115)
ALT FLD-CCNC: 24 U/L — SIGNIFICANT CHANGE UP (ref 0–41)
ANION GAP SERPL CALC-SCNC: 13 MMOL/L — SIGNIFICANT CHANGE UP (ref 7–14)
AST SERPL-CCNC: 19 U/L — SIGNIFICANT CHANGE UP (ref 0–41)
BILIRUB SERPL-MCNC: 0.4 MG/DL — SIGNIFICANT CHANGE UP (ref 0.2–1.2)
BUN SERPL-MCNC: 14 MG/DL — SIGNIFICANT CHANGE UP (ref 10–20)
CALCIUM SERPL-MCNC: 9.1 MG/DL — SIGNIFICANT CHANGE UP (ref 8.5–10.1)
CHLORIDE SERPL-SCNC: 102 MMOL/L — SIGNIFICANT CHANGE UP (ref 98–110)
CO2 SERPL-SCNC: 24 MMOL/L — SIGNIFICANT CHANGE UP (ref 17–32)
CREAT SERPL-MCNC: 0.6 MG/DL — LOW (ref 0.7–1.5)
GLUCOSE SERPL-MCNC: 118 MG/DL — HIGH (ref 70–99)
HCT VFR BLD CALC: 36.1 % — LOW (ref 37–47)
HGB BLD-MCNC: 11.8 G/DL — LOW (ref 12–16)
MAGNESIUM SERPL-MCNC: 2.1 MG/DL — SIGNIFICANT CHANGE UP (ref 1.8–2.4)
MCHC RBC-ENTMCNC: 28.6 PG — SIGNIFICANT CHANGE UP (ref 27–31)
MCHC RBC-ENTMCNC: 32.7 G/DL — SIGNIFICANT CHANGE UP (ref 32–37)
MCV RBC AUTO: 87.4 FL — SIGNIFICANT CHANGE UP (ref 81–99)
NRBC # BLD: 0 /100 WBCS — SIGNIFICANT CHANGE UP (ref 0–0)
NT-PROBNP SERPL-SCNC: 6153 PG/ML — HIGH (ref 0–300)
PLATELET # BLD AUTO: 197 K/UL — SIGNIFICANT CHANGE UP (ref 130–400)
POTASSIUM SERPL-MCNC: 4.2 MMOL/L — SIGNIFICANT CHANGE UP (ref 3.5–5)
POTASSIUM SERPL-SCNC: 4.2 MMOL/L — SIGNIFICANT CHANGE UP (ref 3.5–5)
PROT SERPL-MCNC: 5.2 G/DL — LOW (ref 6–8)
RBC # BLD: 4.13 M/UL — LOW (ref 4.2–5.4)
RBC # FLD: 14.2 % — SIGNIFICANT CHANGE UP (ref 11.5–14.5)
SODIUM SERPL-SCNC: 139 MMOL/L — SIGNIFICANT CHANGE UP (ref 135–146)
WBC # BLD: 6.39 K/UL — SIGNIFICANT CHANGE UP (ref 4.8–10.8)
WBC # FLD AUTO: 6.39 K/UL — SIGNIFICANT CHANGE UP (ref 4.8–10.8)

## 2018-08-03 PROCEDURE — ZZZZZ: CPT

## 2018-08-03 PROCEDURE — 22513 PERQ VERTEBRAL AUGMENTATION: CPT

## 2018-08-03 RX ORDER — ACETAMINOPHEN 500 MG
650 TABLET ORAL EVERY 6 HOURS
Qty: 0 | Refills: 0 | Status: DISCONTINUED | OUTPATIENT
Start: 2018-08-03 | End: 2018-08-06

## 2018-08-03 RX ORDER — CARVEDILOL PHOSPHATE 80 MG/1
3.12 CAPSULE, EXTENDED RELEASE ORAL EVERY 12 HOURS
Qty: 0 | Refills: 0 | Status: DISCONTINUED | OUTPATIENT
Start: 2018-08-03 | End: 2018-08-06

## 2018-08-03 RX ORDER — ENOXAPARIN SODIUM 100 MG/ML
40 INJECTION SUBCUTANEOUS AT BEDTIME
Qty: 0 | Refills: 0 | Status: DISCONTINUED | OUTPATIENT
Start: 2018-08-03 | End: 2018-08-06

## 2018-08-03 RX ORDER — SODIUM CHLORIDE 9 MG/ML
1000 INJECTION, SOLUTION INTRAVENOUS
Qty: 0 | Refills: 0 | Status: DISCONTINUED | OUTPATIENT
Start: 2018-08-03 | End: 2018-08-03

## 2018-08-03 RX ORDER — DOCUSATE SODIUM 100 MG
100 CAPSULE ORAL
Qty: 0 | Refills: 0 | Status: DISCONTINUED | OUTPATIENT
Start: 2018-08-03 | End: 2018-08-06

## 2018-08-03 RX ORDER — SODIUM CHLORIDE 9 MG/ML
3 INJECTION INTRAMUSCULAR; INTRAVENOUS; SUBCUTANEOUS EVERY 8 HOURS
Qty: 0 | Refills: 0 | Status: DISCONTINUED | OUTPATIENT
Start: 2018-08-03 | End: 2018-08-06

## 2018-08-03 RX ORDER — ONDANSETRON 8 MG/1
4 TABLET, FILM COATED ORAL ONCE
Qty: 0 | Refills: 0 | Status: DISCONTINUED | OUTPATIENT
Start: 2018-08-03 | End: 2018-08-03

## 2018-08-03 RX ORDER — SENNA PLUS 8.6 MG/1
2 TABLET ORAL AT BEDTIME
Qty: 0 | Refills: 0 | Status: DISCONTINUED | OUTPATIENT
Start: 2018-08-03 | End: 2018-08-06

## 2018-08-03 RX ORDER — LISINOPRIL 2.5 MG/1
20 TABLET ORAL DAILY
Qty: 0 | Refills: 0 | Status: DISCONTINUED | OUTPATIENT
Start: 2018-08-03 | End: 2018-08-06

## 2018-08-03 RX ORDER — CEFAZOLIN SODIUM 1 G
1000 VIAL (EA) INJECTION EVERY 8 HOURS
Qty: 0 | Refills: 0 | Status: COMPLETED | OUTPATIENT
Start: 2018-08-03 | End: 2018-08-04

## 2018-08-03 RX ORDER — ASPIRIN/CALCIUM CARB/MAGNESIUM 324 MG
81 TABLET ORAL DAILY
Qty: 0 | Refills: 0 | Status: DISCONTINUED | OUTPATIENT
Start: 2018-08-03 | End: 2018-08-06

## 2018-08-03 RX ORDER — OXYCODONE HYDROCHLORIDE 5 MG/1
5 TABLET ORAL EVERY 6 HOURS
Qty: 0 | Refills: 0 | Status: DISCONTINUED | OUTPATIENT
Start: 2018-08-03 | End: 2018-08-06

## 2018-08-03 RX ORDER — MORPHINE SULFATE 50 MG/1
2 CAPSULE, EXTENDED RELEASE ORAL
Qty: 0 | Refills: 0 | Status: DISCONTINUED | OUTPATIENT
Start: 2018-08-03 | End: 2018-08-03

## 2018-08-03 RX ORDER — SIMVASTATIN 20 MG/1
40 TABLET, FILM COATED ORAL AT BEDTIME
Qty: 0 | Refills: 0 | Status: DISCONTINUED | OUTPATIENT
Start: 2018-08-03 | End: 2018-08-06

## 2018-08-03 RX ADMIN — CARVEDILOL PHOSPHATE 3.12 MILLIGRAM(S): 80 CAPSULE, EXTENDED RELEASE ORAL at 17:04

## 2018-08-03 RX ADMIN — Medication 100 MILLIGRAM(S): at 17:04

## 2018-08-03 RX ADMIN — Medication 81 MILLIGRAM(S): at 17:03

## 2018-08-03 RX ADMIN — CARVEDILOL PHOSPHATE 3.12 MILLIGRAM(S): 80 CAPSULE, EXTENDED RELEASE ORAL at 06:05

## 2018-08-03 RX ADMIN — SIMVASTATIN 40 MILLIGRAM(S): 20 TABLET, FILM COATED ORAL at 22:04

## 2018-08-03 RX ADMIN — Medication 100 MILLIGRAM(S): at 22:03

## 2018-08-03 RX ADMIN — SODIUM CHLORIDE 3 MILLILITER(S): 9 INJECTION INTRAMUSCULAR; INTRAVENOUS; SUBCUTANEOUS at 22:23

## 2018-08-03 RX ADMIN — LISINOPRIL 20 MILLIGRAM(S): 2.5 TABLET ORAL at 06:05

## 2018-08-03 RX ADMIN — Medication 100 MILLIGRAM(S): at 06:05

## 2018-08-03 RX ADMIN — ENOXAPARIN SODIUM 40 MILLIGRAM(S): 100 INJECTION SUBCUTANEOUS at 22:03

## 2018-08-03 RX ADMIN — Medication 1 TABLET(S): at 17:03

## 2018-08-03 RX ADMIN — SENNA PLUS 2 TABLET(S): 8.6 TABLET ORAL at 22:04

## 2018-08-03 NOTE — PROGRESS NOTE ADULT - ASSESSMENT
resolved electrolyte derangements  HTN   fall  pelvic fracture / vertebral fracture  pulm nodules  EF 30% / ischemic cmp / old AWMI    plan:    gentle ivf  cont lisinopril 20mg pp qd  cont coreg 6.125mg po bid  now s/p thoracic kyphoplasty  pain control  pt / rehab  monitor bp's

## 2018-08-03 NOTE — PROGRESS NOTE ADULT - SUBJECTIVE AND OBJECTIVE BOX
Patient seen/ chart reviewed           afebrile            vss       awake / alert      able to move all ext

## 2018-08-03 NOTE — DIETITIAN INITIAL EVALUATION ADULT. - FEEDING SKILL
independent/Pt reports consuming 50-75% of her meal trays and is content. Pt reports she's not really a big eater and is consuming the same amount of food she eats at home.

## 2018-08-03 NOTE — BRIEF OPERATIVE NOTE - POST-OP DX
Closed compression fracture of thoracic vertebra with delayed healing, subsequent encounter  08/03/2018    Active  Cindy Perez

## 2018-08-03 NOTE — DIETITIAN INITIAL EVALUATION ADULT. - ENERGY NEEDS
Estimated Calorie Needs: 1663-1366 kcal/day (MSJ x 1.1-1.2 AF)--slightly decreased needs d/t overweight BMI + advanced age noted  Estimated Protein Needs: 62-69 gm/day (0.9-1 gm/kg lowest wt)  Estimated Fluid Needs: 1 ml/kcal

## 2018-08-03 NOTE — PROGRESS NOTE ADULT - ASSESSMENT
Imp : multitrauma - left pelvic fx / acetabular fx                              - T12 comp fx, s/p kyphoplasty  Plan : continue bedside therapy            patient can tolerate 3hr PT/OT daily            will screen for 4a

## 2018-08-03 NOTE — PROGRESS NOTE ADULT - SUBJECTIVE AND OBJECTIVE BOX
LOW GUZMÁN 89y Female  MRN#: [id number]     SUBJECTIVE  Patient is a 89y old Female who presents with a chief complaint of s/p fall ,transferred for [facility] for trauma work up  found to have T 12 and pelvic fracture   Currently admitted to medicine with the primary diagnosis of Pubic ramus fracture    Today is hospital day 7d, and this morning she reports no overnight events.    OBJECTIVE  PAST MEDICAL & SURGICAL HISTORY  Arthritis  High cholesterol  Hypertension  History of   S/P cholecystectomy    ALLERGIES:  No Known Allergies    MEDICATIONS:  STANDING MEDICATIONS  aspirin  chewable 81 milliGRAM(s) Oral daily  carvedilol 3.125 milliGRAM(s) Oral every 12 hours  ceFAZolin   IVPB 1000 milliGRAM(s) IV Intermittent every 8 hours  docusate sodium 100 milliGRAM(s) Oral two times a day  enoxaparin Injectable 40 milliGRAM(s) SubCutaneous at bedtime  lisinopril 20 milliGRAM(s) Oral daily  multivitamin 1 Tablet(s) Oral daily  senna 2 Tablet(s) Oral at bedtime  simvastatin 40 milliGRAM(s) Oral at bedtime  sodium chloride 0.9% lock flush 3 milliLiter(s) IV Push every 8 hours    PRN MEDICATIONS  acetaminophen   Tablet 650 milliGRAM(s) Oral every 6 hours PRN  oxyCODONE    IR 5 milliGRAM(s) Oral every 6 hours PRN      VITAL SIGNS: Last 24 Hours  T(C): 35.7 (03 Aug 2018 14:38), Max: 37.4 (03 Aug 2018 13:05)  T(F): 96.3 (03 Aug 2018 14:38), Max: 99.3 (03 Aug 2018 13:05)  HR: 72 (03 Aug 2018 14:38) (64 - 72)  BP: 143/71 (03 Aug 2018 14:38) (119/58 - 159/78)  BP(mean): --  RR: 18 (03 Aug 2018 14:38) (18 - 20)  SpO2: 99% (03 Aug 2018 14:05) (97% - 99%)    LABS:                        11.8   6.39  )-----------( 197      ( 03 Aug 2018 05:53 )             36.1     08-03    139  |  102  |  14  ----------------------------<  118<H>  4.2   |  24  |  0.6<L>    Ca    9.1      03 Aug 2018 05:53  Mg     2.1     -    TPro  5.2<L>  /  Alb  3.2<L>  /  TBili  0.4  /  DBili  x   /  AST  19  /  ALT  24  /  AlkPhos  54  08-    PT/INR - ( 02 Aug 2018 22:15 )   PT: 13.40 sec;   INR: 1.24 ratio         PTT - ( 02 Aug 2018 22:15 )  PTT:34.9 sec              RADIOLOGY:      PHYSICAL EXAM:    GENERAL: NAD, well-developed, AAOx3  HEENT:  Atraumatic, Normocephalic. EOMI, PERRLA, conjunctiva and sclera clear, No JVD  PULMONARY: Clear to auscultation bilaterally; No wheeze  CARDIOVASCULAR: Regular rate and rhythm; No murmurs, rubs, or gallops  GASTROINTESTINAL: Soft, Nontender, Nondistended; Bowel sounds present  MUSCULOSKELETAL:  2+ Peripheral Pulses, No clubbing, cyanosis, or edema  NEUROLOGY: non-focal  SKIN: No rashes or lesions      ADMISSION SUMMARY  Patient is a 89y old Female who presents with a chief complaint of s/p fall ,transferred for [facility] for trauma work up  found to have T 12 and pelvic fracture . s/p Kyphoplasty  Currently admitted to medicine with the primary diagnosis of Pubic ramus fracture and T12 fracture        ASSESSMENT & PLAN    # L acetabular fx and T 12 fx, s/p mechanical fall s/p Kyphoplasty  -cleared by trauma   -Weight bearing as tolerated as per ortho  -no intervention for pelvic fx as per ortho  -Patient had kyphoplasty today. Reports improved pain control post surgery.  -Normal LE muscle function  -c/w pain control: oxycodone and Tylenol PRN (limit NSAIDS)  -TLSO brace received, PT/rehab eval appreciated (c/w bedside therapy as tolerated 3hrs/day by PT/OT)    #Troponemia - stable  -ECHO showed AWMI and ischemic cardiomyopathy EF of 30%  -pt cancelled cath, Dr. Khoury aware and ok, no life vest is needed  -pt will f/u with her own cardiologist as outpatient  -continue medical therapy with ASA, statin, lisinopril, coreg 3.125mg BID  -Trop: 0.17 > 0.17, CK: 6.4 > 6.4    #Incidental pulmonary nodule found on CT  -Pulm: Pt will need a repeat CT chest and follow up with Dr. Donnelly in 6 months  #HTN  -Nephro: D/C HCTZ due to hypokalemia  -c/w lisinopril -added coreg 3.125 mg PO BID    #CAD/DLD;  -ASA 81mg, statin, lisinopril, coreg 3.125mg BID    #DVT Prophylaxis: cw lovenox    #Diet; DASH  #Planned Disposition: Rehab

## 2018-08-03 NOTE — DIETITIAN INITIAL EVALUATION ADULT. - ORAL INTAKE PTA
Pt reports good appetite/po intake and follows a low salt diet PTA. Denies use of oral nutrition supplements and has NKFA./good

## 2018-08-03 NOTE — DIETITIAN INITIAL EVALUATION ADULT. - PHYSICAL APPEARANCE
alert and oriented. BMI: 27.7 (62"/152#), IBW: 110#, UBW: 150#--denies any unintentional wt loss. Ajay 18/overweight

## 2018-08-03 NOTE — DIETITIAN INITIAL EVALUATION ADULT. - OTHER INFO
Pt s/p fall ,transferred from [facility] for trauma work up, found to have T 12 and pelvic fracture. Pt cleared by trauma; Weight bearing as tolerated as per ortho. No intervention for pelvic fx as per ortho. Patient decided to have kyphoplasty; neurosurgery scheduled her for surgery tomorrow. Waiting for Cardiology clearance. Reason for Assessment: LOS

## 2018-08-03 NOTE — CHART NOTE - NSCHARTNOTEFT_GEN_A_CORE
PACU ANESTHESIA ADMISSION NOTE      Procedure: Thoracic kyphoplasty: T12    Post op diagnosis:  Closed compression fracture of thoracic vertebra with delayed healing, subsequent encounter      ____  Intubated  TV:______       Rate: ______      FiO2: ______    _x___  Patent Airway    _x___  Full return of protective reflexes    _x___  Full recovery from anesthesia / back to baseline status    Vitals:            T:   99.3             BP :    159/78            R: 12             Sat:   97%            P: 68      Mental Status:  _x___ Awake   _____ Alert   _____ Drowsy   _____ Sedated    Nausea/Vomiting:  _x___  NO       ______Yes,   See Post - Op Orders         Pain Scale (0-10):  __0___    Treatment: ____ None    __x__ See Post - Op/PCA Orders    Post - Operative Fluids:   __x__ Oral   ____ See Post - Op Orders    Plan: Discharge:   ____Home       ___x__Floor     _____Critical Care    _____  Other:_________________    Comments:  No anesthesia issues or complications noted.  Discharge when criteria met.

## 2018-08-03 NOTE — PROGRESS NOTE ADULT - SUBJECTIVE AND OBJECTIVE BOX
Patient was seen and examined. Spoke with RN. Chart reviewed.    No events overnight.  Vital Signs Last 24 Hrs  T(F): 97.8 (03 Aug 2018 09:59), Max: 98.7 (02 Aug 2018 20:03)  HR: 67 (03 Aug 2018 09:59) (67 - 71)  BP: 119/58 (03 Aug 2018 09:59) (119/58 - 142/70)  SpO2: 97% (03 Aug 2018 09:59) (97% - 97%)  MEDICATIONS  (STANDING):  lactated ringers. 1000 milliLiter(s) (50 mL/Hr) IV Continuous <Continuous>    MEDICATIONS  (PRN):  morphine  - Injectable 2 milliGRAM(s) IV Push every 10 minutes PRN Moderate Pain (4 - 6)  ondansetron Injectable 4 milliGRAM(s) IV Push once PRN Nausea and/or Vomiting    Labs:                        11.8   6.39  )-----------( 197      ( 03 Aug 2018 05:53 )             36.1                         11.9   6.99  )-----------( 205      ( 02 Aug 2018 07:03 )             36.3     03 Aug 2018 05:53    139    |  102    |  14     ----------------------------<  118    4.2     |  24     |  0.6    02 Aug 2018 07:03    141    |  103    |  15     ----------------------------<  127    4.0     |  24     |  0.6      Ca    9.1        03 Aug 2018 05:53  Ca    9.0        02 Aug 2018 07:03  Mg     2.1       03 Aug 2018 05:53    TPro  5.2    /  Alb  3.2    /  TBili  0.4    /  DBili  x      /  AST  19     /  ALT  24     /  AlkPhos  54     03 Aug 2018 05:53    PT/INR - ( 02 Aug 2018 22:15 )   PT: 13.40 sec;   INR: 1.24 ratio         PTT - ( 02 Aug 2018 22:15 )  PTT:34.9 sec        Radiology:    General: comfortable, NAD  Neurology: A&Ox3, nonfocal  Head:  Normocephalic, atraumatic  ENT:  Mucosa moist, no ulcerations  Neck:  Supple, no JVD,   Skin: no breakdowns (as per RN)  Resp: CTA B/L  CV: RRR, S1S2,   GI: Soft, NT, bowel sounds  MS: No edema, + peripheral pulses, FROM all 4 extremity

## 2018-08-03 NOTE — PROGRESS NOTE ADULT - SUBJECTIVE AND OBJECTIVE BOX
NEPHROLOGY FOLLOW UP:    pt earlier today  now post op kyphoplasty  spoke with team  no new complaints    PAST MEDICAL & SURGICAL HISTORY:  Arthritis  High cholesterol  Hypertension  History of   S/P cholecystectomy    Allergies:  No Known Allergies    Home Medications Reviewed    SOCIAL HISTORY:  Denies ETOH,Smoking,     FAMILY HISTORY:  No pertinent family history in first degree relatives    REVIEW OF SYSTEMS:  All other review of systems is negative unless indicated above.    PHYSICAL EXAM:  NAD  awake and alert  dry mm  no jvd  cta bl  rrr  soft, nt  no edema    Hospital Medications:   MEDICATIONS  (STANDING):  lactated ringers. 1000 milliLiter(s) (50 mL/Hr) IV Continuous <Continuous>        VITALS:  T(F): 99.3 (18 @ 13:05), Max: 99.3 (18 @ 13:05)  HR: 64 (18 @ 13:50)  BP: 143/74 (18 @ 13:35)  RR: 18 (18 @ 13:50)  SpO2: 99% (18 @ 13:50)  Wt(kg): --     @ 07:01  -   @ 07:00  --------------------------------------------------------  IN: 930 mL / OUT: 1301 mL / NET: -371 mL     @ 07:01  -   @ 07:00  --------------------------------------------------------  IN: 0 mL / OUT: 151 mL / NET: -151 mL      Height (cm): 157.48 ( @ 10:15)  Weight (kg): 69 ( 10:15)  BMI (kg/m2): 27.8 ( 10:15)  BSA (m2): 1.7 ( 10:15)    LABS:      139  |  102  |  14  ----------------------------<  118<H>  4.2   |  24  |  0.6<L>    Ca    9.1      03 Aug 2018 05:53  Mg     2.1         TPro  5.2<L>  /  Alb  3.2<L>  /  TBili  0.4  /  DBili      /  AST  19  /  ALT  24  /  AlkPhos  54                            11.8   6.39  )-----------( 197      ( 03 Aug 2018 05:53 )             36.1       Urine Studies:        RADIOLOGY & ADDITIONAL STUDIES:

## 2018-08-03 NOTE — PROGRESS NOTE ADULT - ASSESSMENT
ADMISSION SUMMARY  Patient is a 89y old Female who presents with a chief complaint of s/p fall ,transferred for [facility] for trauma work up  found to have T 12 and pelvic fracture   Currently admitted to medicine with the primary diagnosis of Pubic ramus fracture      ASSESSMENT & PLAN    L acetabular fx and T 12 fx, s/p mechanical fall - stable  -cleared by trauma   -Weight bearing as tolerated as per ortho  -no intervention for pelvic fx as per ortho  -Patient decided to have kyphoplasty .   -Neuro Surgery scheduled her for surgery tomorrow. Waiting for Cardiology clearance. Tried to reach Dr.Jennifer Ochoa for the clearance.  -c/w pain control: oxycodone and Tylenol PRN (limit NSAIDS)  -TLSO brace received, PT/rehab eval appreciated (c/w bedside therapy as tolerated 3hrs/day by PT/OT)    #Troponemia - stable  -ECHO showed AWMI and ischemic cardiomyopathy EF of 30%  -pt cancelled cath, Dr. Khoury aware and ok, no life vest is needed  -pt will f/u with her own cardiologist as outpatient  -continue medical therapy with ASA, statin, lisinopril, coreg 3.125mg BID  -Trop: 0.17 > 0.17, CK: 6.4 > 6.4    #Incidental pulmonary nodule found on CT  -Pulm: Pt will need a repeat CT chest and follow up with Dr. Donnelly in 6 months  #HTN  -Nephro: D/C HCTZ due to hypokalemia  -c/w lisinopril   -added coreg 3.125 mg PO BID  #CAD/DLD;  -ASA 81mg, statin, lisinopril, coreg 3.125mg BID    #DVT Prophylaxis: cw lovenox    #Diet; DASH    #Planned Disposition: Rehab

## 2018-08-03 NOTE — PROGRESS NOTE ADULT - SUBJECTIVE AND OBJECTIVE BOX
LOW GUZMÁN  MRN-[id number]      S/P  T12 kyphoplasty       Current issues:  stable back pain improved some  no radicular pain     HPI:  89 F with Hx of HTN, HLD ,arthritis ,transferred from [facility] for trauma work up .   She was complaining of  back and Left lateral thigh pain of 2 day duration after a mechanical fall at a hotel in [facility].  Patient stated  she was waiting for elevator when she tripped and fall backwards, landing on her left posterior hip and back.  Patient denies LOC.  Visited an [facility] where she was prescribed tylenol , [place] returning to the [place] she came straight to the ED here at [facility] first and than was transferred here at [place] for further work up.    She also  reported  limiting her mobility due to exacerbation of her back pain.  Pain is localized to the left lumbar region and hip.    pt was takings aspirin 325 mg daily ,as per pt it helped with her arthritis and good for heart.  no c/o headache, N/V ,fever and chills ,no c/o urinary or fecal incontinence ,neither any weakness or paresthesias     In ER ;  · Temp (F): 97.8	  · Temp (C) Temp (C): 36.6	  · Temp site Temp Site: oral	  · Heart Rate Heart Rate (beats/min): 76	  · BP Systolic Systolic: 136	  · BP Diastolic Diastolic (mm Hg): 68	  · Respiration Rate (breaths/min) Respiration Rate (breaths/min): 18	  · SpO2 (%) SpO2 (%): 96	  · O2 delivery Patient On: room air	    trauma work up done;  found to have Left acetabular and T12 compression fx   trauma, ortho and Neurosurgery consulted  later ,pt was cleaerd by trauma, no intervention as per ortho and NS has requested MRI of lumber and thoracic spine  got IV morphine and 1 L NS in ER (2018 06:13)      Allergies    No Known Allergies    Intolerances      MEDICATIONS  (STANDING):  aspirin  chewable 81 milliGRAM(s) Oral daily  carvedilol 3.125 milliGRAM(s) Oral every 12 hours  ceFAZolin   IVPB 1000 milliGRAM(s) IV Intermittent every 8 hours  docusate sodium 100 milliGRAM(s) Oral two times a day  enoxaparin Injectable 40 milliGRAM(s) SubCutaneous at bedtime  lisinopril 20 milliGRAM(s) Oral daily  multivitamin 1 Tablet(s) Oral daily  senna 2 Tablet(s) Oral at bedtime  simvastatin 40 milliGRAM(s) Oral at bedtime  sodium chloride 0.9% lock flush 3 milliLiter(s) IV Push every 8 hours    MEDICATIONS  (PRN):  acetaminophen   Tablet 650 milliGRAM(s) Oral every 6 hours PRN pain  oxyCODONE    IR 5 milliGRAM(s) Oral every 6 hours PRN Severe Pain (7 - 10)    Vital Signs Last 24 Hrs  T(C): 35.7 (03 Aug 2018 14:38), Max: 37.4 (03 Aug 2018 13:05)  T(F): 96.3 (03 Aug 2018 14:38), Max: 99.3 (03 Aug 2018 13:05)  HR: 72 (03 Aug 2018 14:38) (64 - 72)  BP: 143/71 (03 Aug 2018 14:38) (119/58 - 159/78)  BP(mean): --  RR: 18 (03 Aug 2018 14:38) (18 - 20)  SpO2: 99% (03 Aug 2018 14:05) (97% - 99%)    I&O's Detail    02 Aug 2018 07:  -  03 Aug 2018 07:00  --------------------------------------------------------  IN:  Total IN: 0 mL    OUT:    Stool: 1 mL    Voided: 150 mL  Total OUT: 151 mL    Total NET: -151 mL      03 Aug 2018 07:01  -  03 Aug 2018 18:22  --------------------------------------------------------  IN:  Total IN: 0 mL    OUT:    Voided: 200 mL  Total OUT: 200 mL    Total NET: -200 mL            139  |  102  |  14  ----------------------------<  118<H>  4.2   |  24  |  0.6<L>    Ca    9.1      03 Aug 2018 05:53  Mg     2.1     08-    TPro  5.2<L>  /  Alb  3.2<L>  /  TBili  0.4  /  DBili  x   /  AST  19  /  ALT  24  /  AlkPhos  54  08-                          11.8   6.39  )-----------( 197      ( 03 Aug 2018 05:53 )             36.1           Exam:    mobile LE strength intact  back while in bed less pain than usual  local pain from incision     Plan:  stable   PT OOB rehab eval              Home Medications:  acetaminophen 325 mg oral tablet: 2 tab(s) orally every 6 hours, As needed, pain (01 Aug 2018 15:28)  aspirin 81 mg oral tablet, chewable: 1 tab(s) orally once a day (01 Aug 2018 15:28)  carvedilol 3.125 mg oral tablet: 1 tab(s) orally every 12 hours (01 Aug 2018 15:28)  enoxaparin: 40 milligram(s) subcutaneous once a day (01 Aug 2018 15:28)  lisinopril 20 mg oral tablet: 1 tab(s) orally once a day (01 Aug 2018 15:28)  Multiple Vitamins oral tablet: 1 tab(s) orally once a day (01 Aug 2018 15:28)  simvastatin 40 mg oral tablet: 1 tab(s) orally once a day (at bedtime) (01 Aug 2018 15:28)    PAST MEDICAL & SURGICAL HISTORY:  Arthritis  High cholesterol  Hypertension  History of   S/P cholecystectomy

## 2018-08-03 NOTE — BRIEF OPERATIVE NOTE - PROCEDURE
<<-----Click on this checkbox to enter Procedure Thoracic kyphoplasty  08/03/2018  T12  Active  HANYTIVALA

## 2018-08-04 LAB
ANION GAP SERPL CALC-SCNC: 17 MMOL/L — HIGH (ref 7–14)
BUN SERPL-MCNC: 14 MG/DL — SIGNIFICANT CHANGE UP (ref 10–20)
CALCIUM SERPL-MCNC: 9.2 MG/DL — SIGNIFICANT CHANGE UP (ref 8.5–10.1)
CHLORIDE SERPL-SCNC: 101 MMOL/L — SIGNIFICANT CHANGE UP (ref 98–110)
CO2 SERPL-SCNC: 23 MMOL/L — SIGNIFICANT CHANGE UP (ref 17–32)
CREAT SERPL-MCNC: 0.7 MG/DL — SIGNIFICANT CHANGE UP (ref 0.7–1.5)
GLUCOSE SERPL-MCNC: 155 MG/DL — HIGH (ref 70–99)
HCT VFR BLD CALC: 39.2 % — SIGNIFICANT CHANGE UP (ref 37–47)
HGB BLD-MCNC: 12.7 G/DL — SIGNIFICANT CHANGE UP (ref 12–16)
MCHC RBC-ENTMCNC: 28.7 PG — SIGNIFICANT CHANGE UP (ref 27–31)
MCHC RBC-ENTMCNC: 32.4 G/DL — SIGNIFICANT CHANGE UP (ref 32–37)
MCV RBC AUTO: 88.5 FL — SIGNIFICANT CHANGE UP (ref 81–99)
NRBC # BLD: 0 /100 WBCS — SIGNIFICANT CHANGE UP (ref 0–0)
NT-PROBNP SERPL-SCNC: 6626 PG/ML — HIGH (ref 0–300)
PLATELET # BLD AUTO: 211 K/UL — SIGNIFICANT CHANGE UP (ref 130–400)
POTASSIUM SERPL-MCNC: 4.4 MMOL/L — SIGNIFICANT CHANGE UP (ref 3.5–5)
POTASSIUM SERPL-SCNC: 4.4 MMOL/L — SIGNIFICANT CHANGE UP (ref 3.5–5)
RBC # BLD: 4.43 M/UL — SIGNIFICANT CHANGE UP (ref 4.2–5.4)
RBC # FLD: 14.5 % — SIGNIFICANT CHANGE UP (ref 11.5–14.5)
SODIUM SERPL-SCNC: 141 MMOL/L — SIGNIFICANT CHANGE UP (ref 135–146)
WBC # BLD: 7.48 K/UL — SIGNIFICANT CHANGE UP (ref 4.8–10.8)
WBC # FLD AUTO: 7.48 K/UL — SIGNIFICANT CHANGE UP (ref 4.8–10.8)

## 2018-08-04 RX ADMIN — Medication 100 MILLIGRAM(S): at 06:01

## 2018-08-04 RX ADMIN — SODIUM CHLORIDE 3 MILLILITER(S): 9 INJECTION INTRAMUSCULAR; INTRAVENOUS; SUBCUTANEOUS at 21:25

## 2018-08-04 RX ADMIN — SENNA PLUS 2 TABLET(S): 8.6 TABLET ORAL at 21:34

## 2018-08-04 RX ADMIN — SIMVASTATIN 40 MILLIGRAM(S): 20 TABLET, FILM COATED ORAL at 21:34

## 2018-08-04 RX ADMIN — CARVEDILOL PHOSPHATE 3.12 MILLIGRAM(S): 80 CAPSULE, EXTENDED RELEASE ORAL at 06:01

## 2018-08-04 RX ADMIN — Medication 650 MILLIGRAM(S): at 21:49

## 2018-08-04 RX ADMIN — CARVEDILOL PHOSPHATE 3.12 MILLIGRAM(S): 80 CAPSULE, EXTENDED RELEASE ORAL at 17:05

## 2018-08-04 RX ADMIN — SODIUM CHLORIDE 3 MILLILITER(S): 9 INJECTION INTRAMUSCULAR; INTRAVENOUS; SUBCUTANEOUS at 13:00

## 2018-08-04 RX ADMIN — LISINOPRIL 20 MILLIGRAM(S): 2.5 TABLET ORAL at 06:01

## 2018-08-04 RX ADMIN — SODIUM CHLORIDE 3 MILLILITER(S): 9 INJECTION INTRAMUSCULAR; INTRAVENOUS; SUBCUTANEOUS at 06:04

## 2018-08-04 RX ADMIN — Medication 100 MILLIGRAM(S): at 17:05

## 2018-08-04 RX ADMIN — ENOXAPARIN SODIUM 40 MILLIGRAM(S): 100 INJECTION SUBCUTANEOUS at 21:34

## 2018-08-04 RX ADMIN — Medication 81 MILLIGRAM(S): at 11:15

## 2018-08-04 RX ADMIN — Medication 1 TABLET(S): at 11:15

## 2018-08-04 RX ADMIN — Medication 100 MILLIGRAM(S): at 13:01

## 2018-08-04 NOTE — PROGRESS NOTE ADULT - SUBJECTIVE AND OBJECTIVE BOX
Subjective: 89yFemale with a pmhx of PUBIC RAMUS FRACTURE;COMPRESSION FRACTURE OF BODY OF THORACIC VERTEBRA  ^PUBIC RAMUS FRACTURE;COMPRESSION FRACTURE OF BODY OF THORACIC VERTEBRA  No pertinent family history in first degree relatives  Handoff  MEWS Score  Arthritis  High cholesterol  Hypertension  Closed compression fracture of thoracic vertebra with delayed healing, subsequent encounter  Closed compression fracture of thoracic vertebra with delayed healing, subsequent encounter  Pubic ramus fracture  Thoracic kyphoplasty  History of   S/P cholecystectomy  No significant past surgical history  FALL/NO LOC  90+  NSTEMI (non-ST elevated myocardial infarction)  Pulmonary nodule seen on imaging study  Hypertension  Fall, initial encounter  Compression fracture of body of thoracic vertebra    POD#1 S/P  T12 kyphoplasty     Pt seen and examined at bedside. Pt admits to having residual back pain but admits it is 25% better than pre-op. Pt is abl;e to sit in a chair comfortably. Pt denies radicular pain    Allergies    No Known Allergies    Intolerances        Vital Signs Last 24 Hrs  T(C): 36.1 (04 Aug 2018 05:40), Max: 37.4 (03 Aug 2018 13:05)  T(F): 97 (04 Aug 2018 05:40), Max: 99.3 (03 Aug 2018 13:05)  HR: 59 (04 Aug 2018 05:40) (59 - 72)  BP: 114/59 (04 Aug 2018 05:40) (114/59 - 159/78)  BP(mean): --  RR: 18 (04 Aug 2018 05:40) (18 - 20)  SpO2: 99% (03 Aug 2018 14:05) (99% - 99%)      acetaminophen   Tablet 650 milliGRAM(s) Oral every 6 hours PRN  aspirin  chewable 81 milliGRAM(s) Oral daily  carvedilol 3.125 milliGRAM(s) Oral every 12 hours  ceFAZolin   IVPB 1000 milliGRAM(s) IV Intermittent every 8 hours  docusate sodium 100 milliGRAM(s) Oral two times a day  enoxaparin Injectable 40 milliGRAM(s) SubCutaneous at bedtime  lisinopril 20 milliGRAM(s) Oral daily  multivitamin 1 Tablet(s) Oral daily  oxyCODONE    IR 5 milliGRAM(s) Oral every 6 hours PRN  senna 2 Tablet(s) Oral at bedtime  simvastatin 40 milliGRAM(s) Oral at bedtime  sodium chloride 0.9% lock flush 3 milliLiter(s) IV Push every 8 hours        18 @ 07:01  -  18 @ 07:00  --------------------------------------------------------  IN: 360 mL / OUT: 700 mL / NET: -340 mL          Exam:  b/l lower extremity motor intact  still complaining of back pain but improved since pre-op        CBC Full  -  ( 03 Aug 2018 05:53 )  WBC Count : 6.39 K/uL  Hemoglobin : 11.8 g/dL  Hematocrit : 36.1 %  Platelet Count - Automated : 197 K/uL  Mean Cell Volume : 87.4 fL  Mean Cell Hemoglobin : 28.6 pg  Mean Cell Hemoglobin Concentration : 32.7 g/dL        139  |  102  |  14  ----------------------------<  118<H>  4.2   |  24  |  0.6<L>    Ca    9.1      03 Aug 2018 05:53  Mg     2.1         TPro  5.2<L>  /  Alb  3.2<L>  /  TBili  0.4  /  DBili  x   /  AST  19  /  ALT  24  /  AlkPhos  54  -    PT/INR - ( 02 Aug 2018 22:15 )   PT: 13.40 sec;   INR: 1.24 ratio         PTT - ( 02 Aug 2018 22:15 )  PTT:34.9 sec        Wound:  removed bandaids from back incisions    Imaging:  < from: MR Lumbar Spine No Cont (18 @ 11:33) >  Impression:    1. T12 vertebral body moderate acute compression fracture with mild   retropulsion into the spinal canal and mild spinal canal stenosis. No   cord compression. No additional compression fractures or areas of bone   marrow edema.    2. Indeterminant subcentimeter lesion seen in the T10 vertebral body was   noted on thoracic spine MRI. Recommend bone scan to evaluate for   additional osseous lesions.    < end of copied text >      Assessment/Plan: as above  Pt doing well  OOB, Ambulate  Pt/rehab  should follow up with Dr. Peerz at [address] [place] [address] in 10-14 days [phone]  recall PRN  D/w attending

## 2018-08-04 NOTE — PROGRESS NOTE ADULT - SUBJECTIVE AND OBJECTIVE BOX
LOW GUZMÁN 89y Female  MRN#: [id number]   CODE STATUS:________      SUBJECTIVE  Patient is a 89y old Female who presents with a chief complaint of fall [place]  found to have T 12 and pelvic fracture s/p kyphoplasty on 8/3/2018.  Currently admitted to medicine with the primary diagnosis of Pubic ramus fracture  Today is hospital day 8d, and this morning she reports no overnight events.     OBJECTIVE  PAST MEDICAL & SURGICAL HISTORY  Arthritis  High cholesterol  Hypertension  History of   S/P cholecystectomy    ALLERGIES:  No Known Allergies    MEDICATIONS:  STANDING MEDICATIONS  aspirin  chewable 81 milliGRAM(s) Oral daily  carvedilol 3.125 milliGRAM(s) Oral every 12 hours  ceFAZolin   IVPB 1000 milliGRAM(s) IV Intermittent every 8 hours  docusate sodium 100 milliGRAM(s) Oral two times a day  enoxaparin Injectable 40 milliGRAM(s) SubCutaneous at bedtime  lisinopril 20 milliGRAM(s) Oral daily  multivitamin 1 Tablet(s) Oral daily  senna 2 Tablet(s) Oral at bedtime  simvastatin 40 milliGRAM(s) Oral at bedtime  sodium chloride 0.9% lock flush 3 milliLiter(s) IV Push every 8 hours    PRN MEDICATIONS  acetaminophen   Tablet 650 milliGRAM(s) Oral every 6 hours PRN  oxyCODONE    IR 5 milliGRAM(s) Oral every 6 hours PRN      VITAL SIGNS: Last 24 Hours  T(C): 36.1 (04 Aug 2018 05:40), Max: 37.4 (03 Aug 2018 13:05)  T(F): 97 (04 Aug 2018 05:40), Max: 99.3 (03 Aug 2018 13:05)  HR: 59 (04 Aug 2018 05:40) (59 - 72)  BP: 114/59 (04 Aug 2018 05:40) (114/59 - 159/78)  BP(mean): --  RR: 18 (04 Aug 2018 05:40) (18 - 20)  SpO2: 99% (03 Aug 2018 14:05) (99% - 99%)    LABS:                        12.7   7.48  )-----------( 211      ( 04 Aug 2018 10:37 )             39.2     08-03    139  |  102  |  14  ----------------------------<  118<H>  4.2   |  24  |  0.6<L>    Ca    9.1      03 Aug 2018 05:53  Mg     2.1     -    TPro  5.2<L>  /  Alb  3.2<L>  /  TBili  0.4  /  DBili  x   /  AST  19  /  ALT  24  /  AlkPhos  54  08-    PT/INR - ( 02 Aug 2018 22:15 )   PT: 13.40 sec;   INR: 1.24 ratio         PTT - ( 02 Aug 2018 22:15 )  PTT:34.9 sec              RADIOLOGY:      PHYSICAL EXAM:    GENERAL: NAD, well-developed, AAOx3  HEENT:  Atraumatic, Normocephalic. EOMI, PERRLA, conjunctiva and sclera clear, No JVD  PULMONARY: Clear to auscultation bilaterally; No wheeze  CARDIOVASCULAR: Regular rate and rhythm; No murmurs, rubs, or gallops  GASTROINTESTINAL: Soft, Nontender, Nondistended; Bowel sounds present  MUSCULOSKELETAL:  2+ Peripheral Pulses, No clubbing, cyanosis, or edema  NEUROLOGY: non-focal  SKIN: No rashes or lesions      ADMISSION SUMMARY  Patient is a 89y old Female who presents with a chief complaint of s/p fall ,transferred for [facility] for trauma work up  found to have T 12 and pelvic fracture.  Currently admitted to medicine with the primary diagnosis of Pubic ramus fracture.    ASSESSMENT & PLAN  # L acetabular fx and T 12 fx, s/p mechanical fall s/p Kyphoplasty  -cleared by trauma   -Weight bearing as tolerated as per ortho  -no intervention for pelvic fx as per ortho  -Patient had kyphoplasty on 8/3/2018. Reports improved pain control post surgery.  -Normal LE muscle function  -c/w pain control: oxycodone and Tylenol PRN (limit NSAIDS)  -TLSO brace received, PT/rehab eval appreciated (c/w bedside therapy as tolerated 3hrs/day by PT/OT)    #Troponemia - stable  -ECHO showed AWMI and ischemic cardiomyopathy EF of 30%  -pt cancelled cath, Dr. Khoury aware and ok, no life vest is needed  -pt will f/u with her own cardiologist as outpatient  -continue medical therapy with ASA, statin, lisinopril, coreg 3.125mg BID  -Trop: 0.17 > 0.17, CK: 6.4 > 6.4    #Incidental pulmonary nodule found on CT  -Pulm: Pt will need a repeat CT chest and follow up with Dr. Donnelly in 6 months  #HTN  -Nephro: D/C HCTZ due to hypokalemia  -c/w lisinopril -added coreg 3.125 mg PO BID    #CAD/DLD;  -ASA 81mg, statin, lisinopril, coreg 3.125mg BID    #DVT Prophylaxis: cw lovenox    #Diet; DASH  #Planned Disposition: Rehab

## 2018-08-04 NOTE — PROGRESS NOTE ADULT - SUBJECTIVE AND OBJECTIVE BOX
Patient was seen and examined. Spoke with RN. Chart reviewed.    No events overnight.  Vital Signs Last 24 Hrs  T(F): 97.3 (04 Aug 2018 12:53), Max: 97.6 (03 Aug 2018 20:40)  HR: 68 (04 Aug 2018 12:53) (59 - 72)  BP: 134/75 (04 Aug 2018 12:53) (114/59 - 143/71)  SpO2: --  MEDICATIONS  (STANDING):  aspirin  chewable 81 milliGRAM(s) Oral daily  carvedilol 3.125 milliGRAM(s) Oral every 12 hours  docusate sodium 100 milliGRAM(s) Oral two times a day  enoxaparin Injectable 40 milliGRAM(s) SubCutaneous at bedtime  lisinopril 20 milliGRAM(s) Oral daily  multivitamin 1 Tablet(s) Oral daily  senna 2 Tablet(s) Oral at bedtime  simvastatin 40 milliGRAM(s) Oral at bedtime  sodium chloride 0.9% lock flush 3 milliLiter(s) IV Push every 8 hours    MEDICATIONS  (PRN):  acetaminophen   Tablet 650 milliGRAM(s) Oral every 6 hours PRN pain  oxyCODONE    IR 5 milliGRAM(s) Oral every 6 hours PRN Severe Pain (7 - 10)    Labs:                        12.7   7.48  )-----------( 211      ( 04 Aug 2018 10:37 )             39.2                         11.8   6.39  )-----------( 197      ( 03 Aug 2018 05:53 )             36.1     04 Aug 2018 10:37    141    |  101    |  14     ----------------------------<  155    4.4     |  23     |  0.7    03 Aug 2018 05:53    139    |  102    |  14     ----------------------------<  118    4.2     |  24     |  0.6      Ca    9.2        04 Aug 2018 10:37  Ca    9.1        03 Aug 2018 05:53  Mg     2.1       03 Aug 2018 05:53    TPro  5.2    /  Alb  3.2    /  TBili  0.4    /  DBili  x      /  AST  19     /  ALT  24     /  AlkPhos  54     03 Aug 2018 05:53    PT/INR - ( 02 Aug 2018 22:15 )   PT: 13.40 sec;   INR: 1.24 ratio         PTT - ( 02 Aug 2018 22:15 )  PTT:34.9 sec        Radiology:    General: comfortable, NAD  Neurology: A&Ox3, nonfocal  Head:  Normocephalic, atraumatic  ENT:  Mucosa moist, no ulcerations  Neck:  Supple, no JVD,   Skin: no breakdowns (as per RN)  Resp: CTA B/L  CV: RRR, S1S2,   GI: Soft, NT, bowel sounds  MS: No edema, + peripheral pulses, FROM all 4 extremity

## 2018-08-04 NOTE — PROGRESS NOTE ADULT - ASSESSMENT
ASSESSMENT & PLAN  # L acetabular fx and T 12 fx, s/p mechanical fall s/p Kyphoplasty  -cleared by trauma   -Weight bearing as tolerated as per ortho  -no intervention for pelvic fx as per ortho  -Patient had kyphoplasty on 8/3/2018. Reports improved pain control post surgery.  -Normal LE muscle function  -c/w pain control: oxycodone and Tylenol PRN (limit NSAIDS)  -TLSO brace received, PT/rehab eval appreciated (c/w bedside therapy as tolerated 3hrs/day by PT/OT)    #Troponemia - stable  -ECHO showed AWMI and ischemic cardiomyopathy EF of 30%  -pt cancelled cath, Dr. Khoury aware and ok, no life vest is needed  -pt will f/u with her own cardiologist as outpatient  -continue medical therapy with ASA, statin, lisinopril, coreg 3.125mg BID  -Trop: 0.17 > 0.17, CK: 6.4 > 6.4    #Incidental pulmonary nodule found on CT  -Pulm: Pt will need a repeat CT chest and follow up with Dr. Donnelly in 6 months  #HTN  -Nephro: D/C HCTZ due to hypokalemia  -c/w lisinopril -added coreg 3.125 mg PO BID    #CAD/DLD;  -ASA 81mg, statin, lisinopril, coreg 3.125mg BID    #DVT Prophylaxis: cw lovenox    #Diet; DASH  #Planned Disposition: Rehab

## 2018-08-05 LAB
ANION GAP SERPL CALC-SCNC: 14 MMOL/L — SIGNIFICANT CHANGE UP (ref 7–14)
BUN SERPL-MCNC: 14 MG/DL — SIGNIFICANT CHANGE UP (ref 10–20)
CALCIUM SERPL-MCNC: 9.2 MG/DL — SIGNIFICANT CHANGE UP (ref 8.5–10.1)
CHLORIDE SERPL-SCNC: 104 MMOL/L — SIGNIFICANT CHANGE UP (ref 98–110)
CO2 SERPL-SCNC: 23 MMOL/L — SIGNIFICANT CHANGE UP (ref 17–32)
CREAT SERPL-MCNC: 0.7 MG/DL — SIGNIFICANT CHANGE UP (ref 0.7–1.5)
GLUCOSE SERPL-MCNC: 206 MG/DL — HIGH (ref 70–99)
HCT VFR BLD CALC: 38.4 % — SIGNIFICANT CHANGE UP (ref 37–47)
HGB BLD-MCNC: 12.5 G/DL — SIGNIFICANT CHANGE UP (ref 12–16)
MCHC RBC-ENTMCNC: 28.2 PG — SIGNIFICANT CHANGE UP (ref 27–31)
MCHC RBC-ENTMCNC: 32.6 G/DL — SIGNIFICANT CHANGE UP (ref 32–37)
MCV RBC AUTO: 86.7 FL — SIGNIFICANT CHANGE UP (ref 81–99)
NRBC # BLD: 0 /100 WBCS — SIGNIFICANT CHANGE UP (ref 0–0)
PLATELET # BLD AUTO: 197 K/UL — SIGNIFICANT CHANGE UP (ref 130–400)
POTASSIUM SERPL-MCNC: 4.1 MMOL/L — SIGNIFICANT CHANGE UP (ref 3.5–5)
POTASSIUM SERPL-SCNC: 4.1 MMOL/L — SIGNIFICANT CHANGE UP (ref 3.5–5)
RBC # BLD: 4.43 M/UL — SIGNIFICANT CHANGE UP (ref 4.2–5.4)
RBC # FLD: 14.4 % — SIGNIFICANT CHANGE UP (ref 11.5–14.5)
SODIUM SERPL-SCNC: 141 MMOL/L — SIGNIFICANT CHANGE UP (ref 135–146)
WBC # BLD: 5.3 K/UL — SIGNIFICANT CHANGE UP (ref 4.8–10.8)
WBC # FLD AUTO: 5.3 K/UL — SIGNIFICANT CHANGE UP (ref 4.8–10.8)

## 2018-08-05 RX ADMIN — CARVEDILOL PHOSPHATE 3.12 MILLIGRAM(S): 80 CAPSULE, EXTENDED RELEASE ORAL at 05:11

## 2018-08-05 RX ADMIN — Medication 100 MILLIGRAM(S): at 17:56

## 2018-08-05 RX ADMIN — Medication 100 MILLIGRAM(S): at 05:11

## 2018-08-05 RX ADMIN — Medication 650 MILLIGRAM(S): at 18:47

## 2018-08-05 RX ADMIN — Medication 81 MILLIGRAM(S): at 11:21

## 2018-08-05 RX ADMIN — SENNA PLUS 2 TABLET(S): 8.6 TABLET ORAL at 21:38

## 2018-08-05 RX ADMIN — SODIUM CHLORIDE 3 MILLILITER(S): 9 INJECTION INTRAMUSCULAR; INTRAVENOUS; SUBCUTANEOUS at 14:06

## 2018-08-05 RX ADMIN — Medication 1 TABLET(S): at 11:21

## 2018-08-05 RX ADMIN — LISINOPRIL 20 MILLIGRAM(S): 2.5 TABLET ORAL at 05:11

## 2018-08-05 RX ADMIN — SIMVASTATIN 40 MILLIGRAM(S): 20 TABLET, FILM COATED ORAL at 21:38

## 2018-08-05 RX ADMIN — ENOXAPARIN SODIUM 40 MILLIGRAM(S): 100 INJECTION SUBCUTANEOUS at 21:38

## 2018-08-05 RX ADMIN — SODIUM CHLORIDE 3 MILLILITER(S): 9 INJECTION INTRAMUSCULAR; INTRAVENOUS; SUBCUTANEOUS at 21:37

## 2018-08-05 RX ADMIN — CARVEDILOL PHOSPHATE 3.12 MILLIGRAM(S): 80 CAPSULE, EXTENDED RELEASE ORAL at 17:56

## 2018-08-05 RX ADMIN — Medication 650 MILLIGRAM(S): at 11:22

## 2018-08-05 RX ADMIN — SODIUM CHLORIDE 3 MILLILITER(S): 9 INJECTION INTRAMUSCULAR; INTRAVENOUS; SUBCUTANEOUS at 05:02

## 2018-08-05 NOTE — PROGRESS NOTE ADULT - SUBJECTIVE AND OBJECTIVE BOX
LOW GUZMÁN  89y  Female    Patient is a 89y old  Female who presents with a chief complaint of s/p fall ,transferred for [facility] for trauma work up  found to have T 12 and pelvic fracture (01 Aug 2018 15:05)    ALLERGIES:  No Known Allergies      INTERVAL HPI/OVERNIGHT EVENTS:    VITALS:  T(F): 96.7 (08-05-18 @ 05:38), Max: 98.9 (08-04-18 @ 20:32)  HR: 63 (08-05-18 @ 05:38) (60 - 76)  BP: 118/58 (08-05-18 @ 05:38) (118/58 - 137/64)  RR: 18 (08-05-18 @ 05:01) (18 - 18)  SpO2: 95% (08-05-18 @ 08:06) (95% - 95%)    LABS:  08-04    141  |  101  |  14  ----------------------------<  155<H>  4.4   |  23  |  0.7    Ca    9.2      04 Aug 2018 10:37      MICROBIOLOGY:    MEDICATION:  acetaminophen   Tablet 650 milliGRAM(s) Oral every 6 hours PRN  aspirin  chewable 81 milliGRAM(s) Oral daily  carvedilol 3.125 milliGRAM(s) Oral every 12 hours  docusate sodium 100 milliGRAM(s) Oral two times a day  enoxaparin Injectable 40 milliGRAM(s) SubCutaneous at bedtime  lisinopril 20 milliGRAM(s) Oral daily  multivitamin 1 Tablet(s) Oral daily  oxyCODONE    IR 5 milliGRAM(s) Oral every 6 hours PRN  senna 2 Tablet(s) Oral at bedtime  simvastatin 40 milliGRAM(s) Oral at bedtime  sodium chloride 0.9% lock flush 3 milliLiter(s) IV Push every 8 hours    RADIOLOGY & ADDITIONAL TESTS:

## 2018-08-05 NOTE — PROGRESS NOTE ADULT - ASSESSMENT
ASSESSMENT & PLAN  # L acetabular fx and T 12 fx, s/p mechanical fall s/p Kyphoplasty  -cleared by trauma   -Weight bearing as tolerated as per ortho  -no intervention for pelvic fx as per ortho  -Patient had kyphoplasty on 8/3/2018. Reports improved pain control post surgery.  -Normal LE muscle function  -c/w pain control: oxycodone and Tylenol PRN (limit NSAIDS)  -TLSO brace received, PT/rehab eval appreciated (c/w bedside therapy as tolerated 3hrs/day by PT/OT)    #Troponemia - stable  -ECHO showed AWMI and ischemic cardiomyopathy EF of 30%  -pt cancelled cath, Dr. Khoury aware and ok, no life vest is needed  -pt will f/u with her own cardiologist as outpatient  -continue medical therapy with ASA, statin, lisinopril, coreg 3.125mg BID  -Trop: 0.17 > 0.17, CK: 6.4 > 6.4    #Incidental pulmonary nodule found on CT  -Pulm: Pt will need a repeat CT chest and follow up with Dr. Donnelly in 6 months  #HTN  -Nephro: D/C HCTZ due to hypokalemia  -c/w lisinopril -added coreg 3.125 mg PO BID    #CAD/DLD;  -ASA 81mg, statin, lisinopril, coreg 3.125mg BID    #DVT Prophylaxis: cw lovenox    #Diet; DASH  #Planned Disposition: Rehab  AWAIT Placement

## 2018-08-06 ENCOUNTER — INPATIENT (INPATIENT)
Facility: HOSPITAL | Age: 83
LOS: 9 days | Discharge: HOME HEALTH | End: 2018-08-16
Attending: PHYSICAL MEDICINE & REHABILITATION | Admitting: PHYSICAL MEDICINE & REHABILITATION
Payer: MEDICARE

## 2018-08-06 VITALS — SYSTOLIC BLOOD PRESSURE: 126 MMHG | HEART RATE: 64 BPM | DIASTOLIC BLOOD PRESSURE: 59 MMHG | TEMPERATURE: 97 F

## 2018-08-06 DIAGNOSIS — Z98.891 HISTORY OF UTERINE SCAR FROM PREVIOUS SURGERY: Chronic | ICD-10-CM

## 2018-08-06 DIAGNOSIS — Z90.49 ACQUIRED ABSENCE OF OTHER SPECIFIED PARTS OF DIGESTIVE TRACT: Chronic | ICD-10-CM

## 2018-08-06 LAB
ANION GAP SERPL CALC-SCNC: 15 MMOL/L — HIGH (ref 7–14)
BUN SERPL-MCNC: 13 MG/DL — SIGNIFICANT CHANGE UP (ref 10–20)
CALCIUM SERPL-MCNC: 9.4 MG/DL — SIGNIFICANT CHANGE UP (ref 8.5–10.1)
CHLORIDE SERPL-SCNC: 101 MMOL/L — SIGNIFICANT CHANGE UP (ref 98–110)
CO2 SERPL-SCNC: 23 MMOL/L — SIGNIFICANT CHANGE UP (ref 17–32)
CREAT SERPL-MCNC: 0.7 MG/DL — SIGNIFICANT CHANGE UP (ref 0.7–1.5)
GLUCOSE SERPL-MCNC: 125 MG/DL — HIGH (ref 70–99)
HCT VFR BLD CALC: 39.2 % — SIGNIFICANT CHANGE UP (ref 37–47)
HGB BLD-MCNC: 12.7 G/DL — SIGNIFICANT CHANGE UP (ref 12–16)
MCHC RBC-ENTMCNC: 28.4 PG — SIGNIFICANT CHANGE UP (ref 27–31)
MCHC RBC-ENTMCNC: 32.4 G/DL — SIGNIFICANT CHANGE UP (ref 32–37)
MCV RBC AUTO: 87.7 FL — SIGNIFICANT CHANGE UP (ref 81–99)
NRBC # BLD: 0 /100 WBCS — SIGNIFICANT CHANGE UP (ref 0–0)
PLATELET # BLD AUTO: 212 K/UL — SIGNIFICANT CHANGE UP (ref 130–400)
POTASSIUM SERPL-MCNC: 4.1 MMOL/L — SIGNIFICANT CHANGE UP (ref 3.5–5)
POTASSIUM SERPL-SCNC: 4.1 MMOL/L — SIGNIFICANT CHANGE UP (ref 3.5–5)
RBC # BLD: 4.47 M/UL — SIGNIFICANT CHANGE UP (ref 4.2–5.4)
RBC # FLD: 14.5 % — SIGNIFICANT CHANGE UP (ref 11.5–14.5)
SODIUM SERPL-SCNC: 139 MMOL/L — SIGNIFICANT CHANGE UP (ref 135–146)
WBC # BLD: 5.28 K/UL — SIGNIFICANT CHANGE UP (ref 4.8–10.8)
WBC # FLD AUTO: 5.28 K/UL — SIGNIFICANT CHANGE UP (ref 4.8–10.8)

## 2018-08-06 RX ORDER — DOCUSATE SODIUM 100 MG
1 CAPSULE ORAL
Qty: 0 | Refills: 0 | COMMUNITY
Start: 2018-08-06

## 2018-08-06 RX ORDER — PANTOPRAZOLE SODIUM 20 MG/1
40 TABLET, DELAYED RELEASE ORAL
Qty: 0 | Refills: 0 | Status: DISCONTINUED | OUTPATIENT
Start: 2018-08-06 | End: 2018-08-16

## 2018-08-06 RX ORDER — OXYCODONE HYDROCHLORIDE 5 MG/1
1 TABLET ORAL
Qty: 0 | Refills: 0 | COMMUNITY
Start: 2018-08-06

## 2018-08-06 RX ORDER — NYSTATIN CREAM 100000 [USP'U]/G
1 CREAM TOPICAL EVERY 8 HOURS
Qty: 0 | Refills: 0 | Status: DISCONTINUED | OUTPATIENT
Start: 2018-08-06 | End: 2018-08-16

## 2018-08-06 RX ORDER — DOCUSATE SODIUM 100 MG
1 CAPSULE ORAL
Qty: 20 | Refills: 0 | OUTPATIENT
Start: 2018-08-06 | End: 2018-08-15

## 2018-08-06 RX ORDER — SENNA PLUS 8.6 MG/1
2 TABLET ORAL
Qty: 0 | Refills: 0 | COMMUNITY
Start: 2018-08-06

## 2018-08-06 RX ORDER — CARVEDILOL PHOSPHATE 80 MG/1
3.12 CAPSULE, EXTENDED RELEASE ORAL EVERY 12 HOURS
Qty: 0 | Refills: 0 | Status: DISCONTINUED | OUTPATIENT
Start: 2018-08-06 | End: 2018-08-16

## 2018-08-06 RX ORDER — OXYCODONE HYDROCHLORIDE 5 MG/1
10 TABLET ORAL EVERY 4 HOURS
Qty: 0 | Refills: 0 | Status: DISCONTINUED | OUTPATIENT
Start: 2018-08-06 | End: 2018-08-06

## 2018-08-06 RX ORDER — DOCUSATE SODIUM 100 MG
100 CAPSULE ORAL THREE TIMES A DAY
Qty: 0 | Refills: 0 | Status: DISCONTINUED | OUTPATIENT
Start: 2018-08-06 | End: 2018-08-16

## 2018-08-06 RX ORDER — LISINOPRIL 2.5 MG/1
20 TABLET ORAL DAILY
Qty: 0 | Refills: 0 | Status: DISCONTINUED | OUTPATIENT
Start: 2018-08-06 | End: 2018-08-16

## 2018-08-06 RX ORDER — NYSTATIN CREAM 100000 [USP'U]/G
1 CREAM TOPICAL EVERY 8 HOURS
Qty: 0 | Refills: 0 | Status: DISCONTINUED | OUTPATIENT
Start: 2018-08-06 | End: 2018-08-06

## 2018-08-06 RX ORDER — ENOXAPARIN SODIUM 100 MG/ML
40 INJECTION SUBCUTANEOUS DAILY
Qty: 0 | Refills: 0 | Status: DISCONTINUED | OUTPATIENT
Start: 2018-08-06 | End: 2018-08-16

## 2018-08-06 RX ORDER — SENNA PLUS 8.6 MG/1
1 TABLET ORAL AT BEDTIME
Qty: 0 | Refills: 0 | Status: DISCONTINUED | OUTPATIENT
Start: 2018-08-06 | End: 2018-08-16

## 2018-08-06 RX ORDER — ACETAMINOPHEN 500 MG
650 TABLET ORAL EVERY 6 HOURS
Qty: 0 | Refills: 0 | Status: DISCONTINUED | OUTPATIENT
Start: 2018-08-06 | End: 2018-08-06

## 2018-08-06 RX ORDER — ACETAMINOPHEN 500 MG
650 TABLET ORAL EVERY 4 HOURS
Qty: 0 | Refills: 0 | Status: DISCONTINUED | OUTPATIENT
Start: 2018-08-06 | End: 2018-08-16

## 2018-08-06 RX ORDER — SIMVASTATIN 20 MG/1
40 TABLET, FILM COATED ORAL AT BEDTIME
Qty: 0 | Refills: 0 | Status: DISCONTINUED | OUTPATIENT
Start: 2018-08-06 | End: 2018-08-16

## 2018-08-06 RX ORDER — NYSTATIN CREAM 100000 [USP'U]/G
1 CREAM TOPICAL
Qty: 0 | Refills: 0 | COMMUNITY
Start: 2018-08-06

## 2018-08-06 RX ORDER — OXYCODONE HYDROCHLORIDE 5 MG/1
5 TABLET ORAL EVERY 4 HOURS
Qty: 0 | Refills: 0 | Status: DISCONTINUED | OUTPATIENT
Start: 2018-08-06 | End: 2018-08-06

## 2018-08-06 RX ORDER — ASPIRIN/CALCIUM CARB/MAGNESIUM 324 MG
81 TABLET ORAL DAILY
Qty: 0 | Refills: 0 | Status: DISCONTINUED | OUTPATIENT
Start: 2018-08-06 | End: 2018-08-16

## 2018-08-06 RX ADMIN — CARVEDILOL PHOSPHATE 3.12 MILLIGRAM(S): 80 CAPSULE, EXTENDED RELEASE ORAL at 05:35

## 2018-08-06 RX ADMIN — Medication 650 MILLIGRAM(S): at 21:42

## 2018-08-06 RX ADMIN — NYSTATIN CREAM 1 APPLICATION(S): 100000 CREAM TOPICAL at 13:16

## 2018-08-06 RX ADMIN — SODIUM CHLORIDE 3 MILLILITER(S): 9 INJECTION INTRAMUSCULAR; INTRAVENOUS; SUBCUTANEOUS at 13:18

## 2018-08-06 RX ADMIN — Medication 100 MILLIGRAM(S): at 21:42

## 2018-08-06 RX ADMIN — Medication 81 MILLIGRAM(S): at 11:58

## 2018-08-06 RX ADMIN — CARVEDILOL PHOSPHATE 3.12 MILLIGRAM(S): 80 CAPSULE, EXTENDED RELEASE ORAL at 17:48

## 2018-08-06 RX ADMIN — LISINOPRIL 20 MILLIGRAM(S): 2.5 TABLET ORAL at 05:35

## 2018-08-06 RX ADMIN — Medication 1 TABLET(S): at 11:58

## 2018-08-06 RX ADMIN — SODIUM CHLORIDE 3 MILLILITER(S): 9 INJECTION INTRAMUSCULAR; INTRAVENOUS; SUBCUTANEOUS at 05:32

## 2018-08-06 RX ADMIN — Medication 100 MILLIGRAM(S): at 05:35

## 2018-08-06 RX ADMIN — Medication 650 MILLIGRAM(S): at 10:10

## 2018-08-06 RX ADMIN — NYSTATIN CREAM 1 APPLICATION(S): 100000 CREAM TOPICAL at 21:44

## 2018-08-06 RX ADMIN — SIMVASTATIN 40 MILLIGRAM(S): 20 TABLET, FILM COATED ORAL at 21:42

## 2018-08-06 RX ADMIN — SENNA PLUS 1 TABLET(S): 8.6 TABLET ORAL at 21:43

## 2018-08-06 NOTE — PROGRESS NOTE ADULT - SUBJECTIVE AND OBJECTIVE BOX
NEPHROLOGY FOLLOW UP:    s/p kyphoplasty  not comfortable with tlso brace  for dc to 4a today  no cp / fever/ sob    PAST MEDICAL & SURGICAL HISTORY:  Arthritis  High cholesterol  Hypertension  History of   S/P cholecystectomy    Allergies:  No Known Allergies    Home Medications Reviewed    SOCIAL HISTORY:  Denies ETOH,Smoking,     FAMILY HISTORY:  No pertinent family history in first degree relatives    REVIEW OF SYSTEMS:  All other review of systems is negative unless indicated above.    PHYSICAL EXAM:  NAD  awake and alert  dry mm  no jvd  cta bl  rrr  soft, nt  no edema    Hospital Medications:   MEDICATIONS  (STANDING):  aspirin  chewable 81 milliGRAM(s) Oral daily  carvedilol 3.125 milliGRAM(s) Oral every 12 hours  docusate sodium 100 milliGRAM(s) Oral two times a day  enoxaparin Injectable 40 milliGRAM(s) SubCutaneous at bedtime  lisinopril 20 milliGRAM(s) Oral daily  multivitamin 1 Tablet(s) Oral daily  nystatin Powder 1 Application(s) Topical every 8 hours  senna 2 Tablet(s) Oral at bedtime  simvastatin 40 milliGRAM(s) Oral at bedtime  sodium chloride 0.9% lock flush 3 milliLiter(s) IV Push every 8 hours        VITALS:  T(F): 96.9 (18 @ 14:15), Max: 97.5 (18 @ 05:11)  HR: 64 (18 @ 14:15)  BP: 126/59 (18 @ 14:15)  RR: 18 (18 @ 05:11)  SpO2: 95% (18 @ 05:11)  Wt(kg): --     @ 07:  -   @ 07:00  --------------------------------------------------------  IN: 290 mL / OUT: 300 mL / NET: -10 mL     @ 07:01  -   @ 07:00  --------------------------------------------------------  IN: 760 mL / OUT: 422 mL / NET: 338 mL     @ 07:01  -   @ 18:49  --------------------------------------------------------  IN: 0 mL / OUT: 1 mL / NET: -1 mL      Height (cm): 157.48 ( @ 09:59)  Weight (kg): 69 (:59)  BMI (kg/m2): 27.8 (:59)  BSA (m2): 1.7 ( 09:59)    LABS:      139  |  101  |  13  ----------------------------<  125<H>  4.1   |  23  |  0.7    Ca    9.4      06 Aug 2018 07:45                            12.7   5.28  )-----------( 212      ( 06 Aug 2018 07:45 )             39.2       Urine Studies:        RADIOLOGY & ADDITIONAL STUDIES:

## 2018-08-06 NOTE — PROGRESS NOTE ADULT - ASSESSMENT
resolved electrolyte derangements  HTN   fall  pelvic fracture / vertebral fracture s/p kyphoplasty  pulm nodules  EF 30% / ischemic cmp / old AWMI    plan:      cont lisinopril 20mg pp qd  cont coreg 6.125mg po bid  tlso brace  pain control  pt / rehab  dvt prophylaxis  dc to 4a today

## 2018-08-06 NOTE — OCCUPATIONAL THERAPY INITIAL EVALUATION ADULT - PLANNED THERAPY INTERVENTIONS, OT EVAL
IADL retraining/parent/caregiver training.../ADL retraining/bed mobility training/transfer training/balance training

## 2018-08-06 NOTE — PROGRESS NOTE ADULT - SUBJECTIVE AND OBJECTIVE BOX
LOW GUZMÁN  89y  Female    Patient is a 89y old  Female who presents with a chief complaint of s/p fall ,transferred for [facility] for trauma work up  found to have T 12 and pelvic fracture (01 Aug 2018 15:05)    ALLERGIES:  No Known Allergies      INTERVAL HPI/OVERNIGHT EVENTS:    VITALS:  T(F): 97.5 (08-06-18 @ 05:11), Max: 98.2 (08-05-18 @ 14:37)  HR: 64 (08-06-18 @ 05:11) (64 - 86)  BP: 123/58 (08-06-18 @ 05:11) (118/56 - 141/68)  RR: 18 (08-06-18 @ 05:11) (18 - 18)  SpO2: 95% (08-06-18 @ 05:11) (95% - 95%)    LABS:  08-05    141  |  104  |  14  ----------------------------<  206<H>  4.1   |  23  |  0.7    Ca    9.2      05 Aug 2018 10:15      MICROBIOLOGY:    MEDICATION:  acetaminophen   Tablet 650 milliGRAM(s) Oral every 6 hours PRN  acetaminophen   Tablet. 650 milliGRAM(s) Oral every 6 hours PRN  aspirin  chewable 81 milliGRAM(s) Oral daily  carvedilol 3.125 milliGRAM(s) Oral every 12 hours  docusate sodium 100 milliGRAM(s) Oral two times a day  enoxaparin Injectable 40 milliGRAM(s) SubCutaneous at bedtime  lisinopril 20 milliGRAM(s) Oral daily  multivitamin 1 Tablet(s) Oral daily  oxyCODONE    IR 5 milliGRAM(s) Oral every 6 hours PRN  senna 2 Tablet(s) Oral at bedtime  simvastatin 40 milliGRAM(s) Oral at bedtime  sodium chloride 0.9% lock flush 3 milliLiter(s) IV Push every 8 hours    RADIOLOGY & ADDITIONAL TESTS:

## 2018-08-06 NOTE — PROGRESS NOTE ADULT - PROVIDER SPECIALTY LIST ADULT
Cardiology
Cardiology
Internal Medicine
Nephrology
Neurosurgery
Physiatry
Physiatry
Internal Medicine

## 2018-08-07 PROBLEM — I10 ESSENTIAL (PRIMARY) HYPERTENSION: Chronic | Status: ACTIVE | Noted: 2018-07-26

## 2018-08-07 PROBLEM — E78.00 PURE HYPERCHOLESTEROLEMIA, UNSPECIFIED: Chronic | Status: ACTIVE | Noted: 2018-07-26

## 2018-08-07 PROBLEM — M19.90 UNSPECIFIED OSTEOARTHRITIS, UNSPECIFIED SITE: Chronic | Status: ACTIVE | Noted: 2018-07-27

## 2018-08-07 LAB — SURGICAL PATHOLOGY STUDY: SIGNIFICANT CHANGE UP

## 2018-08-07 RX ADMIN — SENNA PLUS 1 TABLET(S): 8.6 TABLET ORAL at 21:44

## 2018-08-07 RX ADMIN — ENOXAPARIN SODIUM 40 MILLIGRAM(S): 100 INJECTION SUBCUTANEOUS at 11:44

## 2018-08-07 RX ADMIN — Medication 1 TABLET(S): at 11:43

## 2018-08-07 RX ADMIN — Medication 100 MILLIGRAM(S): at 11:44

## 2018-08-07 RX ADMIN — NYSTATIN CREAM 1 APPLICATION(S): 100000 CREAM TOPICAL at 11:44

## 2018-08-07 RX ADMIN — Medication 100 MILLIGRAM(S): at 05:48

## 2018-08-07 RX ADMIN — NYSTATIN CREAM 1 APPLICATION(S): 100000 CREAM TOPICAL at 05:48

## 2018-08-07 RX ADMIN — Medication 650 MILLIGRAM(S): at 12:54

## 2018-08-07 RX ADMIN — LISINOPRIL 20 MILLIGRAM(S): 2.5 TABLET ORAL at 05:48

## 2018-08-07 RX ADMIN — SIMVASTATIN 40 MILLIGRAM(S): 20 TABLET, FILM COATED ORAL at 21:44

## 2018-08-07 RX ADMIN — Medication 100 MILLIGRAM(S): at 21:44

## 2018-08-07 RX ADMIN — PANTOPRAZOLE SODIUM 40 MILLIGRAM(S): 20 TABLET, DELAYED RELEASE ORAL at 08:12

## 2018-08-07 RX ADMIN — NYSTATIN CREAM 1 APPLICATION(S): 100000 CREAM TOPICAL at 21:45

## 2018-08-07 RX ADMIN — CARVEDILOL PHOSPHATE 3.12 MILLIGRAM(S): 80 CAPSULE, EXTENDED RELEASE ORAL at 05:48

## 2018-08-07 RX ADMIN — Medication 650 MILLIGRAM(S): at 20:17

## 2018-08-07 RX ADMIN — Medication 81 MILLIGRAM(S): at 11:44

## 2018-08-07 RX ADMIN — CARVEDILOL PHOSPHATE 3.12 MILLIGRAM(S): 80 CAPSULE, EXTENDED RELEASE ORAL at 18:45

## 2018-08-08 LAB
24R-OH-CALCIDIOL SERPL-MCNC: 30 NG/ML — SIGNIFICANT CHANGE UP (ref 30–80)
ALBUMIN SERPL ELPH-MCNC: 3.6 G/DL — SIGNIFICANT CHANGE UP (ref 3.5–5.2)
ALP SERPL-CCNC: 81 U/L — SIGNIFICANT CHANGE UP (ref 30–115)
ALT FLD-CCNC: 16 U/L — SIGNIFICANT CHANGE UP (ref 0–41)
ANION GAP SERPL CALC-SCNC: 13 MMOL/L — SIGNIFICANT CHANGE UP (ref 7–14)
APPEARANCE UR: CLEAR — SIGNIFICANT CHANGE UP
AST SERPL-CCNC: 16 U/L — SIGNIFICANT CHANGE UP (ref 0–41)
BASOPHILS # BLD AUTO: 0.02 K/UL — SIGNIFICANT CHANGE UP (ref 0–0.2)
BASOPHILS NFR BLD AUTO: 0.4 % — SIGNIFICANT CHANGE UP (ref 0–1)
BILIRUB SERPL-MCNC: 0.3 MG/DL — SIGNIFICANT CHANGE UP (ref 0.2–1.2)
BILIRUB UR-MCNC: NEGATIVE — SIGNIFICANT CHANGE UP
BUN SERPL-MCNC: 12 MG/DL — SIGNIFICANT CHANGE UP (ref 10–20)
CALCIUM SERPL-MCNC: 9.1 MG/DL — SIGNIFICANT CHANGE UP (ref 8.5–10.1)
CHLORIDE SERPL-SCNC: 105 MMOL/L — SIGNIFICANT CHANGE UP (ref 98–110)
CO2 SERPL-SCNC: 23 MMOL/L — SIGNIFICANT CHANGE UP (ref 17–32)
COLOR SPEC: YELLOW — SIGNIFICANT CHANGE UP
CREAT SERPL-MCNC: 0.6 MG/DL — LOW (ref 0.7–1.5)
DIFF PNL FLD: NEGATIVE — SIGNIFICANT CHANGE UP
EOSINOPHIL # BLD AUTO: 0.22 K/UL — SIGNIFICANT CHANGE UP (ref 0–0.7)
EOSINOPHIL NFR BLD AUTO: 4.1 % — SIGNIFICANT CHANGE UP (ref 0–8)
GLUCOSE SERPL-MCNC: 123 MG/DL — HIGH (ref 70–99)
GLUCOSE UR QL: NEGATIVE MG/DL — SIGNIFICANT CHANGE UP
HCT VFR BLD CALC: 36.8 % — LOW (ref 37–47)
HGB BLD-MCNC: 12.1 G/DL — SIGNIFICANT CHANGE UP (ref 12–16)
IMM GRANULOCYTES NFR BLD AUTO: 0.4 % — HIGH (ref 0.1–0.3)
KETONES UR-MCNC: NEGATIVE — SIGNIFICANT CHANGE UP
LEUKOCYTE ESTERASE UR-ACNC: ABNORMAL
LYMPHOCYTES # BLD AUTO: 1.52 K/UL — SIGNIFICANT CHANGE UP (ref 1.2–3.4)
LYMPHOCYTES # BLD AUTO: 28 % — SIGNIFICANT CHANGE UP (ref 20.5–51.1)
MAGNESIUM SERPL-MCNC: 2.2 MG/DL — SIGNIFICANT CHANGE UP (ref 1.8–2.4)
MCHC RBC-ENTMCNC: 28.4 PG — SIGNIFICANT CHANGE UP (ref 27–31)
MCHC RBC-ENTMCNC: 32.9 G/DL — SIGNIFICANT CHANGE UP (ref 32–37)
MCV RBC AUTO: 86.4 FL — SIGNIFICANT CHANGE UP (ref 81–99)
MONOCYTES # BLD AUTO: 0.63 K/UL — HIGH (ref 0.1–0.6)
MONOCYTES NFR BLD AUTO: 11.6 % — HIGH (ref 1.7–9.3)
NEUTROPHILS # BLD AUTO: 3.02 K/UL — SIGNIFICANT CHANGE UP (ref 1.4–6.5)
NEUTROPHILS NFR BLD AUTO: 55.5 % — SIGNIFICANT CHANGE UP (ref 42.2–75.2)
NITRITE UR-MCNC: NEGATIVE — SIGNIFICANT CHANGE UP
NRBC # BLD: 0 /100 WBCS — SIGNIFICANT CHANGE UP (ref 0–0)
NT-PROBNP SERPL-SCNC: 7185 PG/ML — HIGH (ref 0–300)
PH UR: 6.5 — SIGNIFICANT CHANGE UP (ref 5–8)
PLATELET # BLD AUTO: 191 K/UL — SIGNIFICANT CHANGE UP (ref 130–400)
POTASSIUM SERPL-MCNC: 4.1 MMOL/L — SIGNIFICANT CHANGE UP (ref 3.5–5)
POTASSIUM SERPL-SCNC: 4.1 MMOL/L — SIGNIFICANT CHANGE UP (ref 3.5–5)
PROT SERPL-MCNC: 5.6 G/DL — LOW (ref 6–8)
PROT UR-MCNC: NEGATIVE MG/DL — SIGNIFICANT CHANGE UP
RBC # BLD: 4.26 M/UL — SIGNIFICANT CHANGE UP (ref 4.2–5.4)
RBC # FLD: 14.2 % — SIGNIFICANT CHANGE UP (ref 11.5–14.5)
SODIUM SERPL-SCNC: 141 MMOL/L — SIGNIFICANT CHANGE UP (ref 135–146)
SP GR SPEC: 1.02 — SIGNIFICANT CHANGE UP (ref 1.01–1.03)
UROBILINOGEN FLD QL: 1 MG/DL (ref 0.2–0.2)
WBC # BLD: 5.43 K/UL — SIGNIFICANT CHANGE UP (ref 4.8–10.8)
WBC # FLD AUTO: 5.43 K/UL — SIGNIFICANT CHANGE UP (ref 4.8–10.8)

## 2018-08-08 RX ORDER — SODIUM CHLORIDE 0.65 %
2 AEROSOL, SPRAY (ML) NASAL EVERY 4 HOURS
Qty: 0 | Refills: 0 | Status: DISCONTINUED | OUTPATIENT
Start: 2018-08-08 | End: 2018-08-16

## 2018-08-08 RX ORDER — BENZOCAINE AND MENTHOL 5; 1 G/100ML; G/100ML
1 LIQUID ORAL
Qty: 0 | Refills: 0 | Status: DISCONTINUED | OUTPATIENT
Start: 2018-08-08 | End: 2018-08-16

## 2018-08-08 RX ADMIN — LISINOPRIL 20 MILLIGRAM(S): 2.5 TABLET ORAL at 06:26

## 2018-08-08 RX ADMIN — Medication 1 TABLET(S): at 11:05

## 2018-08-08 RX ADMIN — Medication 100 MILLIGRAM(S): at 13:07

## 2018-08-08 RX ADMIN — NYSTATIN CREAM 1 APPLICATION(S): 100000 CREAM TOPICAL at 06:26

## 2018-08-08 RX ADMIN — Medication 100 MILLIGRAM(S): at 06:26

## 2018-08-08 RX ADMIN — CARVEDILOL PHOSPHATE 3.12 MILLIGRAM(S): 80 CAPSULE, EXTENDED RELEASE ORAL at 17:14

## 2018-08-08 RX ADMIN — ENOXAPARIN SODIUM 40 MILLIGRAM(S): 100 INJECTION SUBCUTANEOUS at 11:05

## 2018-08-08 RX ADMIN — NYSTATIN CREAM 1 APPLICATION(S): 100000 CREAM TOPICAL at 22:02

## 2018-08-08 RX ADMIN — NYSTATIN CREAM 1 APPLICATION(S): 100000 CREAM TOPICAL at 13:07

## 2018-08-08 RX ADMIN — Medication 650 MILLIGRAM(S): at 03:51

## 2018-08-08 RX ADMIN — Medication 81 MILLIGRAM(S): at 11:05

## 2018-08-08 RX ADMIN — Medication 650 MILLIGRAM(S): at 13:07

## 2018-08-08 RX ADMIN — PANTOPRAZOLE SODIUM 40 MILLIGRAM(S): 20 TABLET, DELAYED RELEASE ORAL at 06:26

## 2018-08-08 RX ADMIN — SIMVASTATIN 40 MILLIGRAM(S): 20 TABLET, FILM COATED ORAL at 22:02

## 2018-08-08 RX ADMIN — BENZOCAINE AND MENTHOL 1 LOZENGE: 5; 1 LIQUID ORAL at 11:06

## 2018-08-08 RX ADMIN — CARVEDILOL PHOSPHATE 3.12 MILLIGRAM(S): 80 CAPSULE, EXTENDED RELEASE ORAL at 06:26

## 2018-08-08 RX ADMIN — Medication 650 MILLIGRAM(S): at 19:50

## 2018-08-09 LAB — WBC UR QL: SIGNIFICANT CHANGE UP /HPF

## 2018-08-09 RX ORDER — CHOLECALCIFEROL (VITAMIN D3) 125 MCG
1000 CAPSULE ORAL DAILY
Qty: 0 | Refills: 0 | Status: DISCONTINUED | OUTPATIENT
Start: 2018-08-09 | End: 2018-08-16

## 2018-08-09 RX ADMIN — SIMVASTATIN 40 MILLIGRAM(S): 20 TABLET, FILM COATED ORAL at 22:01

## 2018-08-09 RX ADMIN — Medication 650 MILLIGRAM(S): at 14:46

## 2018-08-09 RX ADMIN — Medication 81 MILLIGRAM(S): at 11:44

## 2018-08-09 RX ADMIN — NYSTATIN CREAM 1 APPLICATION(S): 100000 CREAM TOPICAL at 11:44

## 2018-08-09 RX ADMIN — CARVEDILOL PHOSPHATE 3.12 MILLIGRAM(S): 80 CAPSULE, EXTENDED RELEASE ORAL at 05:13

## 2018-08-09 RX ADMIN — ENOXAPARIN SODIUM 40 MILLIGRAM(S): 100 INJECTION SUBCUTANEOUS at 11:44

## 2018-08-09 RX ADMIN — NYSTATIN CREAM 1 APPLICATION(S): 100000 CREAM TOPICAL at 22:00

## 2018-08-09 RX ADMIN — PANTOPRAZOLE SODIUM 40 MILLIGRAM(S): 20 TABLET, DELAYED RELEASE ORAL at 05:13

## 2018-08-09 RX ADMIN — Medication 650 MILLIGRAM(S): at 22:02

## 2018-08-09 RX ADMIN — BENZOCAINE AND MENTHOL 1 LOZENGE: 5; 1 LIQUID ORAL at 22:03

## 2018-08-09 RX ADMIN — Medication 1 TABLET(S): at 11:44

## 2018-08-09 RX ADMIN — Medication 1000 UNIT(S): at 16:52

## 2018-08-09 RX ADMIN — BENZOCAINE AND MENTHOL 1 LOZENGE: 5; 1 LIQUID ORAL at 14:47

## 2018-08-09 RX ADMIN — CARVEDILOL PHOSPHATE 3.12 MILLIGRAM(S): 80 CAPSULE, EXTENDED RELEASE ORAL at 17:36

## 2018-08-09 RX ADMIN — LISINOPRIL 20 MILLIGRAM(S): 2.5 TABLET ORAL at 05:13

## 2018-08-09 RX ADMIN — NYSTATIN CREAM 1 APPLICATION(S): 100000 CREAM TOPICAL at 05:13

## 2018-08-09 RX ADMIN — BENZOCAINE AND MENTHOL 1 LOZENGE: 5; 1 LIQUID ORAL at 05:19

## 2018-08-09 RX ADMIN — Medication 100 MILLIGRAM(S): at 11:44

## 2018-08-10 PROCEDURE — 93970 EXTREMITY STUDY: CPT | Mod: 26

## 2018-08-10 RX ADMIN — SIMVASTATIN 40 MILLIGRAM(S): 20 TABLET, FILM COATED ORAL at 22:14

## 2018-08-10 RX ADMIN — SENNA PLUS 1 TABLET(S): 8.6 TABLET ORAL at 22:14

## 2018-08-10 RX ADMIN — Medication 1000 UNIT(S): at 15:42

## 2018-08-10 RX ADMIN — CARVEDILOL PHOSPHATE 3.12 MILLIGRAM(S): 80 CAPSULE, EXTENDED RELEASE ORAL at 17:13

## 2018-08-10 RX ADMIN — Medication 1 TABLET(S): at 12:03

## 2018-08-10 RX ADMIN — BENZOCAINE AND MENTHOL 1 LOZENGE: 5; 1 LIQUID ORAL at 22:49

## 2018-08-10 RX ADMIN — ENOXAPARIN SODIUM 40 MILLIGRAM(S): 100 INJECTION SUBCUTANEOUS at 12:03

## 2018-08-10 RX ADMIN — BENZOCAINE AND MENTHOL 1 LOZENGE: 5; 1 LIQUID ORAL at 14:02

## 2018-08-10 RX ADMIN — LISINOPRIL 20 MILLIGRAM(S): 2.5 TABLET ORAL at 06:57

## 2018-08-10 RX ADMIN — CARVEDILOL PHOSPHATE 3.12 MILLIGRAM(S): 80 CAPSULE, EXTENDED RELEASE ORAL at 06:57

## 2018-08-10 RX ADMIN — NYSTATIN CREAM 1 APPLICATION(S): 100000 CREAM TOPICAL at 12:05

## 2018-08-10 RX ADMIN — Medication 650 MILLIGRAM(S): at 22:14

## 2018-08-10 RX ADMIN — Medication 100 MILLIGRAM(S): at 12:03

## 2018-08-10 RX ADMIN — Medication 100 MILLIGRAM(S): at 15:38

## 2018-08-10 RX ADMIN — Medication 81 MILLIGRAM(S): at 12:03

## 2018-08-10 RX ADMIN — Medication 650 MILLIGRAM(S): at 09:25

## 2018-08-10 RX ADMIN — Medication 100 MILLIGRAM(S): at 22:16

## 2018-08-10 RX ADMIN — NYSTATIN CREAM 1 APPLICATION(S): 100000 CREAM TOPICAL at 22:16

## 2018-08-10 RX ADMIN — NYSTATIN CREAM 1 APPLICATION(S): 100000 CREAM TOPICAL at 06:58

## 2018-08-10 RX ADMIN — PANTOPRAZOLE SODIUM 40 MILLIGRAM(S): 20 TABLET, DELAYED RELEASE ORAL at 06:57

## 2018-08-11 RX ADMIN — Medication 1 TABLET(S): at 12:12

## 2018-08-11 RX ADMIN — Medication 650 MILLIGRAM(S): at 10:22

## 2018-08-11 RX ADMIN — NYSTATIN CREAM 1 APPLICATION(S): 100000 CREAM TOPICAL at 05:53

## 2018-08-11 RX ADMIN — CARVEDILOL PHOSPHATE 3.12 MILLIGRAM(S): 80 CAPSULE, EXTENDED RELEASE ORAL at 05:52

## 2018-08-11 RX ADMIN — Medication 81 MILLIGRAM(S): at 12:12

## 2018-08-11 RX ADMIN — NYSTATIN CREAM 1 APPLICATION(S): 100000 CREAM TOPICAL at 21:23

## 2018-08-11 RX ADMIN — Medication 650 MILLIGRAM(S): at 19:44

## 2018-08-11 RX ADMIN — NYSTATIN CREAM 1 APPLICATION(S): 100000 CREAM TOPICAL at 15:47

## 2018-08-11 RX ADMIN — Medication 100 MILLIGRAM(S): at 15:47

## 2018-08-11 RX ADMIN — Medication 100 MILLIGRAM(S): at 05:52

## 2018-08-11 RX ADMIN — Medication 100 MILLIGRAM(S): at 21:24

## 2018-08-11 RX ADMIN — PANTOPRAZOLE SODIUM 40 MILLIGRAM(S): 20 TABLET, DELAYED RELEASE ORAL at 05:52

## 2018-08-11 RX ADMIN — SIMVASTATIN 40 MILLIGRAM(S): 20 TABLET, FILM COATED ORAL at 21:23

## 2018-08-11 RX ADMIN — CARVEDILOL PHOSPHATE 3.12 MILLIGRAM(S): 80 CAPSULE, EXTENDED RELEASE ORAL at 17:06

## 2018-08-11 RX ADMIN — BENZOCAINE AND MENTHOL 1 LOZENGE: 5; 1 LIQUID ORAL at 21:25

## 2018-08-11 RX ADMIN — Medication 1000 UNIT(S): at 12:12

## 2018-08-11 RX ADMIN — ENOXAPARIN SODIUM 40 MILLIGRAM(S): 100 INJECTION SUBCUTANEOUS at 12:13

## 2018-08-11 RX ADMIN — LISINOPRIL 20 MILLIGRAM(S): 2.5 TABLET ORAL at 05:52

## 2018-08-12 PROCEDURE — 99222 1ST HOSP IP/OBS MODERATE 55: CPT

## 2018-08-12 RX ADMIN — Medication 1 TABLET(S): at 11:15

## 2018-08-12 RX ADMIN — Medication 100 MILLIGRAM(S): at 21:47

## 2018-08-12 RX ADMIN — CARVEDILOL PHOSPHATE 3.12 MILLIGRAM(S): 80 CAPSULE, EXTENDED RELEASE ORAL at 17:30

## 2018-08-12 RX ADMIN — LISINOPRIL 20 MILLIGRAM(S): 2.5 TABLET ORAL at 06:37

## 2018-08-12 RX ADMIN — PANTOPRAZOLE SODIUM 40 MILLIGRAM(S): 20 TABLET, DELAYED RELEASE ORAL at 06:37

## 2018-08-12 RX ADMIN — NYSTATIN CREAM 1 APPLICATION(S): 100000 CREAM TOPICAL at 13:07

## 2018-08-12 RX ADMIN — Medication 100 MILLIGRAM(S): at 06:37

## 2018-08-12 RX ADMIN — SIMVASTATIN 40 MILLIGRAM(S): 20 TABLET, FILM COATED ORAL at 21:47

## 2018-08-12 RX ADMIN — Medication 650 MILLIGRAM(S): at 17:30

## 2018-08-12 RX ADMIN — Medication 650 MILLIGRAM(S): at 11:18

## 2018-08-12 RX ADMIN — Medication 1000 UNIT(S): at 11:15

## 2018-08-12 RX ADMIN — CARVEDILOL PHOSPHATE 3.12 MILLIGRAM(S): 80 CAPSULE, EXTENDED RELEASE ORAL at 06:37

## 2018-08-12 RX ADMIN — Medication 100 MILLIGRAM(S): at 11:15

## 2018-08-12 RX ADMIN — BENZOCAINE AND MENTHOL 1 LOZENGE: 5; 1 LIQUID ORAL at 21:49

## 2018-08-12 RX ADMIN — Medication 81 MILLIGRAM(S): at 11:15

## 2018-08-12 RX ADMIN — ENOXAPARIN SODIUM 40 MILLIGRAM(S): 100 INJECTION SUBCUTANEOUS at 11:14

## 2018-08-12 RX ADMIN — SENNA PLUS 1 TABLET(S): 8.6 TABLET ORAL at 21:47

## 2018-08-13 LAB
ALBUMIN SERPL ELPH-MCNC: 3.2 G/DL — LOW (ref 3.5–5.2)
ALP SERPL-CCNC: 87 U/L — SIGNIFICANT CHANGE UP (ref 30–115)
ALT FLD-CCNC: 13 U/L — SIGNIFICANT CHANGE UP (ref 0–41)
ANION GAP SERPL CALC-SCNC: 11 MMOL/L — SIGNIFICANT CHANGE UP (ref 7–14)
AST SERPL-CCNC: 14 U/L — SIGNIFICANT CHANGE UP (ref 0–41)
BASOPHILS # BLD AUTO: 0.02 K/UL — SIGNIFICANT CHANGE UP (ref 0–0.2)
BASOPHILS NFR BLD AUTO: 0.4 % — SIGNIFICANT CHANGE UP (ref 0–1)
BILIRUB SERPL-MCNC: 0.2 MG/DL — SIGNIFICANT CHANGE UP (ref 0.2–1.2)
BUN SERPL-MCNC: 14 MG/DL — SIGNIFICANT CHANGE UP (ref 10–20)
CALCIUM SERPL-MCNC: 9 MG/DL — SIGNIFICANT CHANGE UP (ref 8.5–10.1)
CHLORIDE SERPL-SCNC: 105 MMOL/L — SIGNIFICANT CHANGE UP (ref 98–110)
CO2 SERPL-SCNC: 25 MMOL/L — SIGNIFICANT CHANGE UP (ref 17–32)
CREAT SERPL-MCNC: 0.6 MG/DL — LOW (ref 0.7–1.5)
EOSINOPHIL # BLD AUTO: 0.18 K/UL — SIGNIFICANT CHANGE UP (ref 0–0.7)
EOSINOPHIL NFR BLD AUTO: 3.6 % — SIGNIFICANT CHANGE UP (ref 0–8)
GLUCOSE SERPL-MCNC: 122 MG/DL — HIGH (ref 70–99)
HCT VFR BLD CALC: 36.8 % — LOW (ref 37–47)
HGB BLD-MCNC: 11.9 G/DL — LOW (ref 12–16)
IMM GRANULOCYTES NFR BLD AUTO: 0.2 % — SIGNIFICANT CHANGE UP (ref 0.1–0.3)
LYMPHOCYTES # BLD AUTO: 2.09 K/UL — SIGNIFICANT CHANGE UP (ref 1.2–3.4)
LYMPHOCYTES # BLD AUTO: 42 % — SIGNIFICANT CHANGE UP (ref 20.5–51.1)
MAGNESIUM SERPL-MCNC: 2 MG/DL — SIGNIFICANT CHANGE UP (ref 1.8–2.4)
MCHC RBC-ENTMCNC: 28.4 PG — SIGNIFICANT CHANGE UP (ref 27–31)
MCHC RBC-ENTMCNC: 32.3 G/DL — SIGNIFICANT CHANGE UP (ref 32–37)
MCV RBC AUTO: 87.8 FL — SIGNIFICANT CHANGE UP (ref 81–99)
MONOCYTES # BLD AUTO: 0.5 K/UL — SIGNIFICANT CHANGE UP (ref 0.1–0.6)
MONOCYTES NFR BLD AUTO: 10 % — HIGH (ref 1.7–9.3)
NEUTROPHILS # BLD AUTO: 2.18 K/UL — SIGNIFICANT CHANGE UP (ref 1.4–6.5)
NEUTROPHILS NFR BLD AUTO: 43.8 % — SIGNIFICANT CHANGE UP (ref 42.2–75.2)
NRBC # BLD: 0 /100 WBCS — SIGNIFICANT CHANGE UP (ref 0–0)
PLATELET # BLD AUTO: 206 K/UL — SIGNIFICANT CHANGE UP (ref 130–400)
POTASSIUM SERPL-MCNC: 4.2 MMOL/L — SIGNIFICANT CHANGE UP (ref 3.5–5)
POTASSIUM SERPL-SCNC: 4.2 MMOL/L — SIGNIFICANT CHANGE UP (ref 3.5–5)
PROT SERPL-MCNC: 5.3 G/DL — LOW (ref 6–8)
RBC # BLD: 4.19 M/UL — LOW (ref 4.2–5.4)
RBC # FLD: 14.2 % — SIGNIFICANT CHANGE UP (ref 11.5–14.5)
SODIUM SERPL-SCNC: 141 MMOL/L — SIGNIFICANT CHANGE UP (ref 135–146)
WBC # BLD: 4.98 K/UL — SIGNIFICANT CHANGE UP (ref 4.8–10.8)
WBC # FLD AUTO: 4.98 K/UL — SIGNIFICANT CHANGE UP (ref 4.8–10.8)

## 2018-08-13 RX ADMIN — Medication 650 MILLIGRAM(S): at 16:01

## 2018-08-13 RX ADMIN — Medication 650 MILLIGRAM(S): at 05:48

## 2018-08-13 RX ADMIN — Medication 100 MILLIGRAM(S): at 05:48

## 2018-08-13 RX ADMIN — Medication 1000 UNIT(S): at 12:11

## 2018-08-13 RX ADMIN — PANTOPRAZOLE SODIUM 40 MILLIGRAM(S): 20 TABLET, DELAYED RELEASE ORAL at 05:48

## 2018-08-13 RX ADMIN — Medication 100 MILLIGRAM(S): at 12:11

## 2018-08-13 RX ADMIN — Medication 650 MILLIGRAM(S): at 21:28

## 2018-08-13 RX ADMIN — SIMVASTATIN 40 MILLIGRAM(S): 20 TABLET, FILM COATED ORAL at 21:28

## 2018-08-13 RX ADMIN — BENZOCAINE AND MENTHOL 1 LOZENGE: 5; 1 LIQUID ORAL at 22:26

## 2018-08-13 RX ADMIN — Medication 81 MILLIGRAM(S): at 12:11

## 2018-08-13 RX ADMIN — NYSTATIN CREAM 1 APPLICATION(S): 100000 CREAM TOPICAL at 21:29

## 2018-08-13 RX ADMIN — Medication 1 TABLET(S): at 12:11

## 2018-08-13 RX ADMIN — ENOXAPARIN SODIUM 40 MILLIGRAM(S): 100 INJECTION SUBCUTANEOUS at 12:11

## 2018-08-13 RX ADMIN — LISINOPRIL 20 MILLIGRAM(S): 2.5 TABLET ORAL at 05:48

## 2018-08-13 RX ADMIN — CARVEDILOL PHOSPHATE 3.12 MILLIGRAM(S): 80 CAPSULE, EXTENDED RELEASE ORAL at 05:48

## 2018-08-13 RX ADMIN — NYSTATIN CREAM 1 APPLICATION(S): 100000 CREAM TOPICAL at 12:12

## 2018-08-13 RX ADMIN — CARVEDILOL PHOSPHATE 3.12 MILLIGRAM(S): 80 CAPSULE, EXTENDED RELEASE ORAL at 17:00

## 2018-08-14 RX ADMIN — Medication 1000 UNIT(S): at 12:11

## 2018-08-14 RX ADMIN — NYSTATIN CREAM 1 APPLICATION(S): 100000 CREAM TOPICAL at 05:31

## 2018-08-14 RX ADMIN — Medication 1 TABLET(S): at 12:11

## 2018-08-14 RX ADMIN — NYSTATIN CREAM 1 APPLICATION(S): 100000 CREAM TOPICAL at 12:12

## 2018-08-14 RX ADMIN — CARVEDILOL PHOSPHATE 3.12 MILLIGRAM(S): 80 CAPSULE, EXTENDED RELEASE ORAL at 05:30

## 2018-08-14 RX ADMIN — Medication 650 MILLIGRAM(S): at 21:09

## 2018-08-14 RX ADMIN — SIMVASTATIN 40 MILLIGRAM(S): 20 TABLET, FILM COATED ORAL at 21:08

## 2018-08-14 RX ADMIN — CARVEDILOL PHOSPHATE 3.12 MILLIGRAM(S): 80 CAPSULE, EXTENDED RELEASE ORAL at 17:10

## 2018-08-14 RX ADMIN — Medication 100 MILLIGRAM(S): at 05:30

## 2018-08-14 RX ADMIN — PANTOPRAZOLE SODIUM 40 MILLIGRAM(S): 20 TABLET, DELAYED RELEASE ORAL at 05:30

## 2018-08-14 RX ADMIN — Medication 81 MILLIGRAM(S): at 12:11

## 2018-08-14 RX ADMIN — Medication 650 MILLIGRAM(S): at 08:19

## 2018-08-14 RX ADMIN — Medication 100 MILLIGRAM(S): at 12:11

## 2018-08-14 RX ADMIN — Medication 1 TABLET(S): at 12:12

## 2018-08-14 RX ADMIN — ENOXAPARIN SODIUM 40 MILLIGRAM(S): 100 INJECTION SUBCUTANEOUS at 12:12

## 2018-08-14 RX ADMIN — NYSTATIN CREAM 1 APPLICATION(S): 100000 CREAM TOPICAL at 21:08

## 2018-08-14 RX ADMIN — LISINOPRIL 20 MILLIGRAM(S): 2.5 TABLET ORAL at 05:30

## 2018-08-15 ENCOUNTER — TRANSCRIPTION ENCOUNTER (OUTPATIENT)
Age: 83
End: 2018-08-15

## 2018-08-15 RX ADMIN — Medication 650 MILLIGRAM(S): at 12:30

## 2018-08-15 RX ADMIN — Medication 81 MILLIGRAM(S): at 12:28

## 2018-08-15 RX ADMIN — NYSTATIN CREAM 1 APPLICATION(S): 100000 CREAM TOPICAL at 21:16

## 2018-08-15 RX ADMIN — NYSTATIN CREAM 1 APPLICATION(S): 100000 CREAM TOPICAL at 05:30

## 2018-08-15 RX ADMIN — Medication 1 TABLET(S): at 12:28

## 2018-08-15 RX ADMIN — SENNA PLUS 1 TABLET(S): 8.6 TABLET ORAL at 21:16

## 2018-08-15 RX ADMIN — CARVEDILOL PHOSPHATE 3.12 MILLIGRAM(S): 80 CAPSULE, EXTENDED RELEASE ORAL at 17:02

## 2018-08-15 RX ADMIN — Medication 1000 UNIT(S): at 12:28

## 2018-08-15 RX ADMIN — SIMVASTATIN 40 MILLIGRAM(S): 20 TABLET, FILM COATED ORAL at 21:16

## 2018-08-15 RX ADMIN — ENOXAPARIN SODIUM 40 MILLIGRAM(S): 100 INJECTION SUBCUTANEOUS at 12:29

## 2018-08-15 RX ADMIN — NYSTATIN CREAM 1 APPLICATION(S): 100000 CREAM TOPICAL at 12:30

## 2018-08-15 RX ADMIN — Medication 100 MILLIGRAM(S): at 05:31

## 2018-08-15 RX ADMIN — LISINOPRIL 20 MILLIGRAM(S): 2.5 TABLET ORAL at 05:31

## 2018-08-15 RX ADMIN — Medication 100 MILLIGRAM(S): at 21:15

## 2018-08-15 RX ADMIN — PANTOPRAZOLE SODIUM 40 MILLIGRAM(S): 20 TABLET, DELAYED RELEASE ORAL at 05:31

## 2018-08-15 RX ADMIN — CARVEDILOL PHOSPHATE 3.12 MILLIGRAM(S): 80 CAPSULE, EXTENDED RELEASE ORAL at 05:30

## 2018-08-15 RX ADMIN — Medication 650 MILLIGRAM(S): at 21:15

## 2018-08-15 NOTE — DISCHARGE NOTE ADULT - REASON FOR ADMISSION
89 yo lady with hx HTN, was on vacation with her family in [facility], where she fell and fx left pelvis (inferior ramus), left acetabular column and T12. When she came back to the  she went to [facility], then was transferred to [place], where she had a kyphoplasty by Dr. Perez on 8/3/18. Post-op she had mildly elevated troponins (0.17 x2) and was transferred to telemetry for NSTEMI, no EKG changes. HCTZ was stopped, Coreg was added, and she is continuing with lisinopril 20mg, which she was on PTA (she took lisinopril HCT at home). She is doing well, pain is relieved with Tylenol. She has a TLSO, which is not needed, only for comfort if she wants. She was unable to do the cardiac cath because she could not lie flat on the table; she will f/up with Dr. Paula Ochoa as out-patient. Also found to have multiple LLL pulmonary nodules on CT chest 7/27/18, the largest is 0.9cm; she never smoked; recommend f/up with Dr. Donnelly in 3 to 6 months. 2D Echo 7/30/18 = systolic LVEF = 30%, Grade 1 diastolic dysfunction, and mild MR. She had a URI during her hospital stay and was treated supportively, it resolved in a few days. She had a venous doppler of b/l LE's on 8/10, which was + for left gastrocnemius DVT, she was seen by Vascular, and to continue Lovenox 40mg SC daily and ASA 81mg daily, and have a follow up doppler in 2 weeks. Upon discharge,   She will be discharged home with home care on Aug. 16, 2018. She will follow up with her PCP, Neurosurgery, Cardiology, and with Pulmonary and Vascular. 89 yo lady with hx HTN, was on vacation with her family in [facility], where she fell and fx left pelvis (inferior ramus), left acetabular column and T12. When she came back to the  she went to [facility], then was transferred to [place], where she had a kyphoplasty by Dr. Perez on 8/3/18. Post-op she had mildly elevated troponins (0.17 x2) and was transferred to telemetry for NSTEMI, no EKG changes. HCTZ was stopped, Coreg was added, and she is continuing with lisinopril 20mg, which she was on PTA (she took lisinopril HCT at home). She is doing well, pain is relieved with Tylenol. She has a TLSO, which is not needed, only for comfort if she wants. She was unable to do the cardiac cath because she could not lie flat on the table; she will f/up with Dr. Paula Ochoa as out-patient. Also found to have multiple LLL pulmonary nodules on CT chest 7/27/18, the largest is 0.9cm; she never smoked; recommend f/up with Dr. Donnelly in 3 to 6 months. 2D Echo 7/30/18 = systolic LVEF = 30%, Grade 1 diastolic dysfunction, and mild MR. She had a URI during her hospital stay and was treated supportively, it resolved in a few days. She had a venous doppler of b/l LE's on 8/10, which was + for left gastrocnemius DVT, she was seen by Vascular, and to continue Lovenox 40mg SC daily and ASA 81mg daily, and have a follow up doppler in 2 weeks. Upon discharge, per Vascular, she can continue taking aspirin 81mg po daily, no need for full anticoagulation.   She will be discharged home with home care on Aug. 16, 2018. She will follow up with her PCP, Neurosurgery, Cardiology, and with Pulmonary and Vascular. 89 yo lady with hx HTN, was on vacation with her family in [facility], where she fell and fx left pelvis (inferior ramus), left acetabular column and T12. When she came back to the  she went to [place] [facility], then was transferred to [place], where she had a kyphoplasty by Dr. Perez on 8/3/18. Post-op she had mildly elevated troponins (0.17 x2) and was transferred to telemetry for NSTEMI, no EKG changes. HCTZ was stopped, Coreg was added, and she is continuing with lisinopril 20mg, which she was on PTA (she took lisinopril HCT at home). She is doing well, pain is relieved with Tylenol. She has a TLSO, which is not needed, only for comfort if she wants. She was unable to do the cardiac cath because she could not lie flat on the table; she will f/up with Dr. Paula Ochoa as out-patient. Also found to have multiple LLL pulmonary nodules on CT chest 7/27/18, the largest is 0.9cm; she never smoked; recommend f/up with Dr. Donnelly in 3 to 6 months. 2D Echo 7/30/18 = systolic LVEF = 30%, Grade 1 diastolic dysfunction, and mild MR. She had a URI during her hospital stay and was treated supportively, it resolved in a few days. She had a venous doppler of b/l LE's on 8/10, which was + for left gastrocnemius DVT, she was seen by Vascular, and to continue Lovenox 40mg SC daily and ASA 81mg daily, and have a follow up doppler in 2 weeks. Upon discharge, per Vascular, she can continue taking aspirin 81mg po daily, no need for full anticoagulation.   She will be discharged home with home care on Aug. 16, 2018. She will follow up with her PCP, Neurosurgery, Cardiology, with Pulmonary and Vascular, and as needed with Orthopedics.

## 2018-08-15 NOTE — DISCHARGE NOTE ADULT - SECONDARY DIAGNOSIS.
Hypertension NSTEMI (non-ST elevation myocardial infarction) Pulmonary nodules DVT, lower extremity, distal, acute, left

## 2018-08-15 NOTE — DISCHARGE NOTE ADULT - NS AS ACTIVITY OBS
No Heavy lifting/straining/Do not drive or operate machinery/Showering allowed/Ambulate with rolling walker and assistance/supervision for your safety, you are weight-bearing as tolerated both lower extremities; wear the TLSO brace for comfort Ambulate with rolling walker and supervision for your safety, you are weight-bearing as tolerated both lower extremities; wear the TLSO brace for comfort/Showering allowed/Do not drive or operate machinery/No Heavy lifting/straining

## 2018-08-15 NOTE — DISCHARGE NOTE ADULT - CARE PROVIDERS DIRECT ADDRESSES
,DirectAddress_Unknown,[email or web address]

## 2018-08-15 NOTE — DISCHARGE NOTE ADULT - MEDICATION SUMMARY - MEDICATIONS TO TAKE
I will START or STAY ON the medications listed below when I get home from the hospital:    acetaminophen 325 mg oral tablet  -- 2 tab(s) by mouth every 6 hours, As needed, for pain or fever  -- Indication: For (Tylenol) take as needed for pain or fever    aspirin 81 mg oral tablet, chewable  -- 1 tab(s) by mouth once a day  -- Indication: For Take daily with food, blood-thinner to protect your heart and brain, you can take the enteric-coated aspirin if you prefer    lisinopril 20 mg oral tablet  -- 1 tab(s) by mouth once a day  -- Indication: For Lowers your blood pressure, also protects your heart    simvastatin 40 mg oral tablet  -- 1 tab(s) by mouth once a day (at bedtime)  -- Indication: For lowers your cholesterol    carvedilol 3.125 mg oral tablet  -- 1 tab(s) by mouth every 12 hours  -- Indication: For (Coreg) lowers your blood pressure and heart rate, also protects your heart    nystatin 100,000 units/g topical powder  -- 1 application on skin every 8 hours (3 times a day)  under breasts and to groin area, until the rash has healed  -- Indication: For anti-fungal powder; clean and dry the affected areas in your groin and under breasts before applying the powder 3 times a day; continue until the rash has completely cleared up    docusate sodium 100 mg oral capsule  -- 1 cap(s) by mouth 1 to 3 times a day, As Needed; stool softener to relieve constipation  -- Indication: For (Colace) stool softener, take if needed for constipation    calcium-vitamin D 500 mg-200 intl units oral tablet  -- 1 tab(s) by mouth once a day  -- Indication: For Makes your bones stronger    Multiple Vitamins oral tablet  -- 1 tab(s) by mouth once a day  -- Indication: For vitamins    cholecalciferol oral tablet  -- 1000 unit(s) by mouth once a day  -- Indication: For vitamin D supplement

## 2018-08-15 NOTE — DISCHARGE NOTE ADULT - CARE PLAN
Principal Discharge DX:	Trauma  Goal:	healing of all injuries, regain independent function without pain or discomfort  Assessment and plan of treatment:	You fractured your left hip and pelvis and T12 (bone in your mid-spine); you had kyphoplasty on 8/3 to repair the fracture in your spine. Continue physical therapy at home, take pain medication as needed, and follow up with Dr. Perez (the neurosurgeon) in 10 to 14 days.  Secondary Diagnosis:	Hypertension  Goal:	healthy blood pressure  Assessment and plan of treatment:	Continue diet and medications as prescribed. Monitor your blood pressure at home and with your PCP and cardiologist.  Secondary Diagnosis:	NSTEMI (non-ST elevation myocardial infarction)  Goal:	healthy heart  Assessment and plan of treatment:	You had a very mild heart attack after your surgery. You are on a heart  healthy diet and taking some new medications. Continue at home and follow up with Dr. Ochoa (the cardiologist) in 1 to 2 weeks.  Secondary Diagnosis:	Pulmonary nodules  Goal:	monitor for growth  Assessment and plan of treatment:	Some small nodules were found in your left lung on CT scan of your chest done July 27. They are most likely benign and due to old inflammation, since you never smoked. However, you should follow up with the Pulmonary (lunga) doctor, Dr. Donnelly, in 3 to 6 months, for another CT scan, to make sure they are not getting bigger.  Secondary Diagnosis:	DVT, lower extremity, distal, acute, left  Goal:	resolution of the blood clot  Assessment and plan of treatment:	Continue to wear the TEDs stockings when you are out of bed, to reduce leg swelling. Take your medication as prescribed and follow up with the Vascular doctor, Dr. Gill, in one week. Principal Discharge DX:	Trauma  Goal:	healing of all injuries, regain independent function without pain or discomfort  Assessment and plan of treatment:	You fractured your left hip and pelvis and T12 (bone in your mid-spine); you had kyphoplasty on 8/3 to repair the fracture in your spine. Continue physical therapy at home, take pain medication as needed, and follow up with Dr. Perez (the neurosurgeon) in 10 to 14 days.  Secondary Diagnosis:	Hypertension  Goal:	healthy blood pressure  Assessment and plan of treatment:	Continue diet and medications as prescribed. Monitor your blood pressure at home and with your PCP and cardiologist.  Secondary Diagnosis:	NSTEMI (non-ST elevation myocardial infarction)  Goal:	healthy heart  Assessment and plan of treatment:	You had a very mild heart attack after your surgery. You are on a heart  healthy diet and taking some new medications. Continue at home and follow up with Dr. Ochoa (the cardiologist) in 1 to 2 weeks.  Secondary Diagnosis:	Pulmonary nodules  Goal:	monitor for growth  Assessment and plan of treatment:	Some small nodules were found in your left lung on CT scan of your chest done July 27. They are most likely benign and due to old inflammation, since you never smoked. However, you should follow up with the Pulmonary (lunga) doctor, Dr. Donnelly, in 3 to 6 months, for another CT scan, to make sure they are not getting bigger.  Secondary Diagnosis:	DVT, lower extremity, distal, acute, left  Goal:	resolution of the blood clot  Assessment and plan of treatment:	Continue to wear the TEDs stockings when you are out of bed, to reduce leg swelling. Take your medication as prescribed and follow up with the Vascular doctor, Dr. Gill, in 2 weeks. Principal Discharge DX:	Trauma  Goal:	healing of all injuries, regain independent function without pain or discomfort  Assessment and plan of treatment:	You fractured your left hip and pelvis and T12 (bone in your mid-spine); you had kyphoplasty on 8/3 to repair the fracture in your spine. Continue physical therapy at home, take pain medication as needed, and follow up with Dr. Perez (the neurosurgeon) in 10 to 14 days.  Secondary Diagnosis:	Hypertension  Goal:	healthy blood pressure  Assessment and plan of treatment:	Continue diet and medications as prescribed. Monitor your blood pressure at home and with your PCP and cardiologist.  Secondary Diagnosis:	NSTEMI (non-ST elevation myocardial infarction)  Goal:	healthy heart  Assessment and plan of treatment:	You had a very mild heart attack after your surgery. You are on a heart  healthy diet and taking some new medications. Continue these medications at home and follow up with Dr. Paula Ochoa (the cardiologist) in 1 to 2 weeks.  Secondary Diagnosis:	Pulmonary nodules  Goal:	monitor for growth  Assessment and plan of treatment:	Some small nodules were found in your left lung on CT scan of your chest done July 27. They are most likely benign and due to old inflammation, since you never smoked. However, you should follow up with the Pulmonary (lunga) doctor, Dr. Donnelly, in 3 to 6 months, for another CT scan, to make sure they are not getting bigger.  Secondary Diagnosis:	DVT, lower extremity, distal, acute, left  Goal:	resolution of the blood clot  Assessment and plan of treatment:	Continue to wear the TEDs stockings when you are out of bed, to reduce leg swelling. Take your medication as prescribed and follow up with the Vascular doctor, Dr. Gill, in 2 weeks. Also keep active by doing your physical therapy and walking. Principal Discharge DX:	Trauma  Goal:	healing of all injuries, regain independent function without pain or discomfort  Assessment and plan of treatment:	You fractured your left hip and pelvis and T12 (bone in your mid-spine); you had kyphoplasty on 8/3 to repair the fracture in your spine. Continue physical therapy at home, take pain medication as needed, and follow up with Dr. Perez (the neurosurgeon) in 10 to 14 days. Also follow up with the Orthopedic doctor, Dr. Yared Kimbrough, if needed, if you continue to have left hip and pelvic pain, or if the pain gets worse.  Secondary Diagnosis:	Hypertension  Goal:	healthy blood pressure  Assessment and plan of treatment:	Continue diet and medications as prescribed. Monitor your blood pressure at home and with your PCP and cardiologist.  Secondary Diagnosis:	NSTEMI (non-ST elevation myocardial infarction)  Goal:	healthy heart  Assessment and plan of treatment:	You had a very mild heart attack after your surgery. You are on a heart  healthy diet and taking some new medications. Continue these medications at home and follow up with Dr. Paula Ochoa (the cardiologist) in 1 to 2 weeks.  Secondary Diagnosis:	Pulmonary nodules  Goal:	monitor for growth  Assessment and plan of treatment:	Some small nodules were found in your left lung on CT scan of your chest done July 27. They are most likely benign and due to old inflammation, since you never smoked. However, you should follow up with the Pulmonary (lunga) doctor, Dr. Donnelly, in 3 to 6 months, for another CT scan, to make sure they are not getting bigger.  Secondary Diagnosis:	DVT, lower extremity, distal, acute, left  Goal:	resolution of the blood clot  Assessment and plan of treatment:	Continue to wear the TEDs stockings when you are out of bed, to reduce leg swelling. Take your medication as prescribed and follow up with the Vascular doctor, Dr. Gill, in 2 weeks. Also keep active by doing your physical therapy and walking. Principal Discharge DX:	Trauma  Goal:	healing of all injuries, regain independent function without pain or discomfort  Assessment and plan of treatment:	You fractured your left hip and pelvis and T12 (bone in your mid-spine); you had kyphoplasty on 8/3 to repair the fracture in your spine. Continue physical therapy at home, take pain medication as needed, and follow up with Dr. Perez (the neurosurgeon) in 10 to 14 days. Also follow up with the Orthopedic doctor, Dr. Yared Kimbrough, if needed, if you continue to have left hip and pelvic pain, or if the pain gets worse.  Secondary Diagnosis:	Hypertension  Goal:	healthy blood pressure  Assessment and plan of treatment:	Continue diet and medications as prescribed. Monitor your blood pressure at home and with your PCP and cardiologist.  Secondary Diagnosis:	NSTEMI (non-ST elevation myocardial infarction)  Goal:	healthy heart  Assessment and plan of treatment:	You had a very mild heart attack after your surgery. You are on a heart  healthy diet and taking some new medications. Continue these medications at home and follow up with Dr. Paula Ochoa (the cardiologist) in 1 to 2 weeks.  Secondary Diagnosis:	Pulmonary nodules  Goal:	monitor for growth  Assessment and plan of treatment:	Some small nodules were found in your left lung on CT scan of your chest done July 27. They are most likely benign and due to old inflammation, since you never smoked. However, you should follow up with the Pulmonary (lung) doctor, Dr. Donnelly, in 3 to 6 months, for another CT scan, to make sure they are not getting bigger.  Secondary Diagnosis:	DVT, lower extremity, distal, acute, left  Goal:	resolution of the blood clot  Assessment and plan of treatment:	Continue to wear the TEDs stockings when you are out of bed, to reduce leg swelling. Take your medication as prescribed and follow up with the Vascular doctor, Dr. Gill, in 2 weeks. Also keep active by doing your physical therapy and walking.

## 2018-08-15 NOTE — DISCHARGE NOTE ADULT - PATIENT PORTAL LINK FT
You can access the [facility] Patient Portal, offered by [facility], by registering with the following website: [email or web address]

## 2018-08-15 NOTE — DISCHARGE NOTE ADULT - PLAN OF CARE
healing of all injuries, regain independent function without pain or discomfort You fractured your left hip and pelvis and T12 (bone in your mid-spine); you had kyphoplasty on 8/3 to repair the fracture in your spine. Continue physical therapy at home, take pain medication as needed, and follow up with Dr. Perez (the neurosurgeon) in 10 to 14 days. healthy blood pressure Continue diet and medications as prescribed. Monitor your blood pressure at home and with your PCP and cardiologist. healthy heart You had a very mild heart attack after your surgery. You are on a heart  healthy diet and taking some new medications. Continue at home and follow up with Dr. Ochoa (the cardiologist) in 1 to 2 weeks. monitor for growth Some small nodules were found in your left lung on CT scan of your chest done July 27. They are most likely benign and due to old inflammation, since you never smoked. However, you should follow up with the Pulmonary (lunga) doctor, Dr. Donnelly, in 3 to 6 months, for another CT scan, to make sure they are not getting bigger. resolution of the blood clot Continue to wear the TEDs stockings when you are out of bed, to reduce leg swelling. Take your medication as prescribed and follow up with the Vascular doctor, Dr. Gill, in one week. Continue to wear the TEDs stockings when you are out of bed, to reduce leg swelling. Take your medication as prescribed and follow up with the Vascular doctor, Dr. Gill, in 2 weeks. You had a very mild heart attack after your surgery. You are on a heart  healthy diet and taking some new medications. Continue these medications at home and follow up with Dr. Paula Ochoa (the cardiologist) in 1 to 2 weeks. Continue to wear the TEDs stockings when you are out of bed, to reduce leg swelling. Take your medication as prescribed and follow up with the Vascular doctor, Dr. Gill, in 2 weeks. Also keep active by doing your physical therapy and walking. You fractured your left hip and pelvis and T12 (bone in your mid-spine); you had kyphoplasty on 8/3 to repair the fracture in your spine. Continue physical therapy at home, take pain medication as needed, and follow up with Dr. Perez (the neurosurgeon) in 10 to 14 days. Also follow up with the Orthopedic doctor, Dr. Yared Kimbrough, if needed, if you continue to have left hip and pelvic pain, or if the pain gets worse. Some small nodules were found in your left lung on CT scan of your chest done July 27. They are most likely benign and due to old inflammation, since you never smoked. However, you should follow up with the Pulmonary (lung) doctor, Dr. Donnelly, in 3 to 6 months, for another CT scan, to make sure they are not getting bigger.

## 2018-08-15 NOTE — DISCHARGE NOTE ADULT - CARE PROVIDER_API CALL
Cidny Perez), Surgical Physicians  [address] [place] [address] [address]  [place] [address]  Phone: [phone]  Fax: [phone]    Paula Ochoa), Internal Medicine  [address] [place] [address] [address]  [place] [address]  Phone: [phone]  Fax: [phone]    Rich Gill), Surgery; Vascular Surgery  [address] [place] [address] [address]  [place] [address]  Phone: [phone]  Fax: [phone]    Austin Donnelly), Critical Care Medicine; Geriatric Medicine; Internal Medicine; Pulmonary Disease  [address] [place] [address] [address]  [place] [address]  Phone: [phone]  Fax: [phone]

## 2018-08-15 NOTE — DISCHARGE NOTE ADULT - ADDITIONAL INSTRUCTIONS
**Please show these discharge papers to all your doctors and caregivers**  **Follow up with your primary care provider (PCP), Dr. Anjel Martinez, in 1 week, also with the cardiologist and Vascular doctors in one week; you can follow up with the neurosurgeon in 10 to 14 days, and with the pulmonary doctor in 3 to 6 months. **Please show these discharge papers to all your doctors and caregivers**  **Follow up with your primary care provider (PCP), Dr. Anjel Martinez, in 1 week, also with the cardiologist and Vascular doctors in one to two weeks; you can follow up with the neurosurgeon in 10 to 14 days, and with the pulmonary doctor in 3 to 6 months. **Please show these discharge papers to all your doctors and caregivers**  **Follow up with your primary care provider (PCP), Dr. Anjel Martinez, in 1 week, also with the cardiologist and Vascular doctors in one to two weeks; you can follow up with the neurosurgeon in 10 to 14 days, and with the pulmonary doctor in 3 to 6 months.    **If you have worsening pain in your left hip or pelvis, you should follow up with the orthopedic doctor, Dr. Yared Kimbrough [address] [facility], [phone]**, otherwise follow up only if needed

## 2018-08-16 ENCOUNTER — INBOUND DOCUMENT (OUTPATIENT)
Age: 83
End: 2018-08-16

## 2018-08-16 PROBLEM — Z00.00 ENCOUNTER FOR PREVENTIVE HEALTH EXAMINATION: Status: ACTIVE | Noted: 2018-08-16

## 2018-08-16 RX ORDER — CARVEDILOL PHOSPHATE 80 MG/1
1 CAPSULE, EXTENDED RELEASE ORAL
Qty: 60 | Refills: 0
Start: 2018-08-16 | End: 2018-09-14

## 2018-08-16 RX ORDER — NYSTATIN CREAM 100000 [USP'U]/G
1 CREAM TOPICAL
Qty: 450 | Refills: 0
Start: 2018-08-16

## 2018-08-16 RX ORDER — DOCUSATE SODIUM 100 MG
1 CAPSULE ORAL
Qty: 0 | Refills: 0 | DISCHARGE
Start: 2018-08-16

## 2018-08-16 RX ORDER — LISINOPRIL 2.5 MG/1
1 TABLET ORAL
Qty: 30 | Refills: 0
Start: 2018-08-16 | End: 2018-09-14

## 2018-08-16 RX ORDER — SIMVASTATIN 20 MG/1
1 TABLET, FILM COATED ORAL
Qty: 30 | Refills: 0
Start: 2018-08-16 | End: 2018-09-14

## 2018-08-16 RX ORDER — CHOLECALCIFEROL (VITAMIN D3) 125 MCG
1000 CAPSULE ORAL
Qty: 0 | Refills: 0 | DISCHARGE
Start: 2018-08-16

## 2018-08-16 RX ADMIN — Medication 81 MILLIGRAM(S): at 12:45

## 2018-08-16 RX ADMIN — Medication 1 TABLET(S): at 12:45

## 2018-08-16 RX ADMIN — NYSTATIN CREAM 1 APPLICATION(S): 100000 CREAM TOPICAL at 12:48

## 2018-08-16 RX ADMIN — Medication 650 MILLIGRAM(S): at 11:39

## 2018-08-16 RX ADMIN — NYSTATIN CREAM 1 APPLICATION(S): 100000 CREAM TOPICAL at 05:25

## 2018-08-16 RX ADMIN — PANTOPRAZOLE SODIUM 40 MILLIGRAM(S): 20 TABLET, DELAYED RELEASE ORAL at 05:25

## 2018-08-16 RX ADMIN — Medication 100 MILLIGRAM(S): at 05:25

## 2018-08-16 RX ADMIN — ENOXAPARIN SODIUM 40 MILLIGRAM(S): 100 INJECTION SUBCUTANEOUS at 12:46

## 2018-08-16 RX ADMIN — Medication 1000 UNIT(S): at 12:45

## 2018-08-27 DIAGNOSIS — I82.432 ACUTE EMBOLISM AND THROMBOSIS OF LEFT POPLITEAL VEIN: ICD-10-CM

## 2018-08-27 DIAGNOSIS — E78.5 HYPERLIPIDEMIA, UNSPECIFIED: ICD-10-CM
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
## 2018-08-27 DIAGNOSIS — S22.089D UNSPECIFIED FRACTURE OF T11-T12 VERTEBRA, SUBSEQUENT ENCOUNTER FOR FRACTURE WITH ROUTINE HEALING: ICD-10-CM
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
## 2018-08-27 DIAGNOSIS — S32.402D UNSPECIFIED FRACTURE OF LEFT ACETABULUM, SUBSEQUENT ENCOUNTER FOR FRACTURE WITH ROUTINE HEALING: ICD-10-CM

## 2018-08-27 DIAGNOSIS — I10 ESSENTIAL (PRIMARY) HYPERTENSION: ICD-10-CM

## 2018-08-27 DIAGNOSIS — Z90.49 ACQUIRED ABSENCE OF OTHER SPECIFIED PARTS OF DIGESTIVE TRACT: ICD-10-CM

## 2018-08-27 DIAGNOSIS — I82.402 ACUTE EMBOLISM AND THROMBOSIS OF UNSPECIFIED DEEP VEINS OF LEFT LOWER EXTREMITY: ICD-10-CM

## 2018-08-27 DIAGNOSIS — I21.4 NON-ST ELEVATION (NSTEMI) MYOCARDIAL INFARCTION: ICD-10-CM

## 2018-08-27 DIAGNOSIS — I25.10 ATHEROSCLEROTIC HEART DISEASE OF NATIVE CORONARY ARTERY WITHOUT ANGINA PECTORIS: ICD-10-CM

## 2018-08-27 DIAGNOSIS — R91.8 OTHER NONSPECIFIC ABNORMAL FINDING OF LUNG FIELD: ICD-10-CM

## 2018-08-27 DIAGNOSIS — S32.592D OTHER SPECIFIED FRACTURE OF LEFT PUBIS, SUBSEQUENT ENCOUNTER FOR FRACTURE WITH ROUTINE HEALING: ICD-10-CM

## 2018-08-28 ENCOUNTER — APPOINTMENT (OUTPATIENT)
Dept: NEUROSURGERY | Facility: CLINIC | Age: 83
End: 2018-08-28
Payer: MEDICARE

## 2018-08-28 PROCEDURE — 99212 OFFICE O/P EST SF 10 MIN: CPT

## 2018-08-29 ENCOUNTER — APPOINTMENT (OUTPATIENT)
Dept: VASCULAR SURGERY | Facility: CLINIC | Age: 83
End: 2018-08-29
Payer: MEDICARE

## 2018-08-29 VITALS
BODY MASS INDEX: 27.6 KG/M2 | HEIGHT: 62 IN | OXYGEN SATURATION: 97 % | SYSTOLIC BLOOD PRESSURE: 170 MMHG | DIASTOLIC BLOOD PRESSURE: 80 MMHG | WEIGHT: 150 LBS | HEART RATE: 71 BPM

## 2018-08-29 DIAGNOSIS — Z86.39 PERSONAL HISTORY OF OTHER ENDOCRINE, NUTRITIONAL AND METABOLIC DISEASE: ICD-10-CM

## 2018-08-29 PROCEDURE — 93970 EXTREMITY STUDY: CPT

## 2018-08-29 PROCEDURE — 99213 OFFICE O/P EST LOW 20 MIN: CPT

## 2018-11-23 ENCOUNTER — EMERGENCY (EMERGENCY)
Facility: HOSPITAL | Age: 83
LOS: 0 days | Discharge: HOME | End: 2018-11-23
Attending: EMERGENCY MEDICINE | Admitting: EMERGENCY MEDICINE

## 2018-11-23 VITALS
SYSTOLIC BLOOD PRESSURE: 227 MMHG | TEMPERATURE: 98 F | HEART RATE: 69 BPM | RESPIRATION RATE: 18 BRPM | HEIGHT: 62 IN | OXYGEN SATURATION: 97 % | DIASTOLIC BLOOD PRESSURE: 105 MMHG | WEIGHT: 149.91 LBS

## 2018-11-23 DIAGNOSIS — Z98.891 HISTORY OF UTERINE SCAR FROM PREVIOUS SURGERY: ICD-10-CM

## 2018-11-23 DIAGNOSIS — M25.512 PAIN IN LEFT SHOULDER: ICD-10-CM

## 2018-11-23 DIAGNOSIS — Z90.49 ACQUIRED ABSENCE OF OTHER SPECIFIED PARTS OF DIGESTIVE TRACT: Chronic | ICD-10-CM

## 2018-11-23 DIAGNOSIS — Z90.49 ACQUIRED ABSENCE OF OTHER SPECIFIED PARTS OF DIGESTIVE TRACT: ICD-10-CM

## 2018-11-23 DIAGNOSIS — M79.81 NONTRAUMATIC HEMATOMA OF SOFT TISSUE: ICD-10-CM

## 2018-11-23 DIAGNOSIS — Z79.899 OTHER LONG TERM (CURRENT) DRUG THERAPY: ICD-10-CM

## 2018-11-23 DIAGNOSIS — I10 ESSENTIAL (PRIMARY) HYPERTENSION: ICD-10-CM

## 2018-11-23 DIAGNOSIS — Z98.891 HISTORY OF UTERINE SCAR FROM PREVIOUS SURGERY: Chronic | ICD-10-CM

## 2018-11-23 DIAGNOSIS — Z79.82 LONG TERM (CURRENT) USE OF ASPIRIN: ICD-10-CM

## 2018-11-23 DIAGNOSIS — E78.00 PURE HYPERCHOLESTEROLEMIA, UNSPECIFIED: ICD-10-CM

## 2018-11-23 RX ORDER — HYDROCHLOROTHIAZIDE 25 MG
1 TABLET ORAL
Qty: 15 | Refills: 0
Start: 2018-11-23 | End: 2018-12-07

## 2018-11-23 RX ORDER — AMLODIPINE BESYLATE 2.5 MG/1
1 TABLET ORAL
Qty: 15 | Refills: 0 | OUTPATIENT
Start: 2018-11-23 | End: 2018-12-07

## 2018-11-23 NOTE — ED ADULT TRIAGE NOTE - CHIEF COMPLAINT QUOTE
"I went to the walk in doctor because after my shower today I noticed a big bruise on my left arm and it hurts, They sent me here because my Bp was very high, it was 220/100"

## 2018-11-23 NOTE — ED PROVIDER NOTE - PROGRESS NOTE DETAILS
L shoulder xray done at urgent care-normal. POCUS done to L arm. No sign of DVT. will d/c home with follow up with PMD on monday. will give 2 week supply of HCTZ.

## 2018-11-23 NOTE — ED PROVIDER NOTE - OBJECTIVE STATEMENT
89 y/o F, h/o F, HTN, DJD, T12 compression fx-kyphoplasty, presents with atraumatic L arm ecchymosis onset today. Pt was seen at urgent care and was found to have a BP of 227/105 that came down 180/90 was concerned and decided to come to the ED for eval. Pt is currently asymptomatic. Denies fever, vomiting, vision changes, CP, SOB, palpitations, diaphoresis, cough, headache, lightheadedness, dizziness,  numbness/tingling, neck pain, blood in stool, melena, urinary symptoms,  weakness, edema, leg swelling, sick contacts, recent travel or rash.

## 2018-11-23 NOTE — ED PROVIDER NOTE - MEDICAL DECISION MAKING DETAILS
Asymptomatic HTN - No HA, no chest pain, no blood in urine, no neurologic symptoms. No evidence of end organ dysfunction on examination.  1) Medication regimen discussed 2) Advised patient to keep a blood pressure diary. 3) Hypertensive Emergency symptoms discussed. 4) Reassurance/follow up with PMD for elevated BP. Patient has bruise to left upper arm with small muscle tear (0.5 cm) to anterior deltoid with full ROM and patient n/v intact.  No signs of DVT.  No bony tenderness.  Will DC.

## 2018-11-23 NOTE — ED PROVIDER NOTE - NS ED ROS FT
Review of Systems:  CONSTITUTIONAL: no fever  EYES: no vision changes  ENT: no nasal discharge  RESPIRATORY: no shortness of breath, no cough  CARDIAC: no chest pain, no palpitations  GI: no vomiting,   NEUROLOGIC: no headache,   PSYCH: no anxiety, non suicidal, non homicidal, no hallucination, no depression

## 2018-11-23 NOTE — ED PROVIDER NOTE - ATTENDING CONTRIBUTION TO CARE
Asymptomatic HTN - No HA, no chest pain, no blood in urine, no neurologic symptoms. No evidence of end organ dysfunction on examination.  1) Medication regimen discussed  2) Advised patient to keep a blood pressure diary.  3) Hypertensive Emergency symptoms discussed.  4) Reassurance/follow up with PMD for elevated BP    Patient has bruise to left upper arm with small muscle tear (0.5 cm) to anterior deltoid with full ROM and patient n/v intact.  No signs of DVT.  No bony tenderness.  Will DC.

## 2018-11-23 NOTE — ED PROVIDER NOTE - PHYSICAL EXAMINATION
VITAL SIGNS: I have reviewed nursing notes and confirm.  CONSTITUTIONAL: Well-developed; well-nourished; in no acute distress.  SKIN: Skin exam is warm and dry, <2 sec cap refill  HEAD: Normocephalic; atraumatic.  EYES: PERRL, EOM intact; normal conjunctiva.  ENT: MMM; airway clear.   NECK: Supple; non tender.  CARD: RRR, 2+ radial pulses  RESP: No wheezes, rales or rhonchi, speaking in full sentences  ABD: soft non tender.   EXT: FROm of L shoulder, elbow and wrist. shoulder mildly TTP.   NEURO: Alert and oriented, face symmetric and speech fluent. Strength 5/5 x 4 ext and symmetric, nml gross motor movement,  nml gait. No focal deficits noted. neurovascularly intact. negative romberg, negative pronator drift. VITAL SIGNS: I have reviewed nursing notes and confirm.  CONSTITUTIONAL: Well-developed; well-nourished; in no acute distress.  SKIN: Skin exam is warm and dry, <2 sec cap refill, ecchymosis to inner aspect of L upper extremity. midlly TTP  HEAD: Normocephalic; atraumatic.  EYES: PERRL, EOM intact; normal conjunctiva.  ENT: MMM; airway clear.   NECK: Supple; non tender.  CARD: RRR, 2+ radial pulses  RESP: No wheezes, rales or rhonchi, speaking in full sentences  ABD: soft non tender.   EXT: FROm of L shoulder, elbow and wrist. shoulder mildly TTP.   NEURO: Alert and oriented, face symmetric and speech fluent. Strength 5/5 x 4 ext and symmetric, nml gross motor movement,  nml gait. No focal deficits noted. neurovascularly intact. negative romberg, negative pronator drift.

## 2018-11-25 ENCOUNTER — TRANSCRIPTION ENCOUNTER (OUTPATIENT)
Age: 83
End: 2018-11-25

## 2020-06-26 ENCOUNTER — OUTPATIENT (OUTPATIENT)
Dept: OUTPATIENT SERVICES | Facility: HOSPITAL | Age: 85
LOS: 1 days | Discharge: HOME | End: 2020-06-26

## 2020-06-26 VITALS
HEIGHT: 62 IN | DIASTOLIC BLOOD PRESSURE: 90 MMHG | WEIGHT: 149.03 LBS | SYSTOLIC BLOOD PRESSURE: 204 MMHG | HEART RATE: 72 BPM | RESPIRATION RATE: 18 BRPM | OXYGEN SATURATION: 99 % | TEMPERATURE: 99 F

## 2020-06-26 VITALS — RESPIRATION RATE: 17 BRPM | SYSTOLIC BLOOD PRESSURE: 206 MMHG | HEART RATE: 63 BPM | DIASTOLIC BLOOD PRESSURE: 79 MMHG

## 2020-06-26 DIAGNOSIS — Z90.49 ACQUIRED ABSENCE OF OTHER SPECIFIED PARTS OF DIGESTIVE TRACT: Chronic | ICD-10-CM

## 2020-06-26 DIAGNOSIS — Z98.891 HISTORY OF UTERINE SCAR FROM PREVIOUS SURGERY: Chronic | ICD-10-CM

## 2020-06-26 NOTE — PRE-ANESTHESIA EVALUATION ADULT - NSANTHSUBSTSD_GEN_ALL_CORE
PT DAILY TREATMENT NOTE - [facility] 2-15    Patient Name: Sangeeta Officer  Date:2017  : 1950  [x]  Patient  Verified  [place]or: [place] / Plan: [place] MEDICARE PART A & B / Product Type: Medicare /    In time:12:00p  Out time:1:20p  Total Treatment Time (min): 80  Total Timed Codes (min): 65  1:1 Treatment Time (MC only): 60  Visit #: 7  Treatment Area: Low back pain [M54.5]  Cervicalgia [M54.2]    SUBJECTIVE  Pain Level (0-10 scale): 5/10  Any medication changes, allergies to medications, adverse drug reactions, diagnosis change, or new procedure performed?: [x] No    [] Yes (see summary sheet for update)  Subjective functional status/changes:   [] No changes reported  Sore this morning and had to get into the warm bath to loosen up some. Mornings are usually the worst. Also Tried leaf blowing my yard on Saturday and that may have contributed to some soreness.      OBJECTIVE                    Modality rationale: decrease inflammation, decrease pain and increase tissue extensibility to improve the patients ability to decrease LBP   Min Type Additional Details     15 [x] Estim: []Att   [x]Unatt        []TENS instruct                  [x]IFC  []Premod   []NMES                     []Other:  []w/US   []w/ice   [x]w/heat  Position: supine with bolster  Location: back and neck      []  Traction: [] Cervical       []Lumbar                       [] Prone          []Supine                       []Intermittent   []Continuous Lbs:  [] before manual  [] after manual  []w/heat      []  Ultrasound: []Continuous   [] Pulsed at:                           []1MHz   []3MHz Location:  W/cm2:      [] Paraffin      Location:   []w/heat    - [x]  Ice     []  Heat  []  Ice massage Position: supine  Location: back, hip, knee      []  Laser  []  Other: Position:  Location:      []  Vasopneumatic Device Pressure:       [] lo [] med [] hi   Temperature:        [x] Skin assessment post-treatment:  [x]intact []redness- no adverse reaction    []redness  adverse reaction:       35 min Therapeutic Exercise:  [x] See flow sheet :   Rationale: increase ROM, increase strength and improve coordination to improve the patients ability to increase function and mobility      15 min Manual Therapy:  lumbar hooklying traction, Cervical STM bilateral paraspinals   Rationale: decrease pain, increase ROM, increase tissue extensibility and decrease trigger points to improve the patients ability to increase mobility      With   [] TE   [] TA   [] neuro   [] other: Patient Education: [x] Review HEP    [] Progressed/Changed HEP based on:   [] positioning   [] body mechanics   [] transfers   [] heat/ice application    [] other:        Other Objective/Functional Measures: --                   Pain Level (0-10 scale) post treatment: 0/10 back, 3/10 neck      ASSESSMENT/Changes in Function:   Pt tolerated session better today and improved pain levels compared to last session. Relief noted with manual traction of lumbar and cervical STM. Performed Premod to cervical and lumbar with MH today. Decreased pain levels following session.        Patient will continue to benefit from skilled PT services to modify and progress therapeutic interventions, address functional mobility deficits, address ROM deficits, address strength deficits, analyze and address soft tissue restrictions, analyze and cue movement patterns and analyze and modify body mechanics/ergonomics to attain remaining goals.      [x]  See Plan of Care  []  See progress note/recertification  []  See Discharge Summary         Progress towards goals / Updated goals:  Updated GOALs:   Long Term Goals:  To be accomplished in 4-6 weeks:  1) Pt will be able to read without increase of neck pain  2) Pt will be able to look over shoulders while driving without increase of neck pain  3) Pt will demonstrate ability to lift >/= 20 lbs without increase of symptoms  4) Pt will be able to Stand greater than 60 minutes without increase of pain  5)  Pt will be able to retrieve item form ground without pain    PLAN  []  Upgrade activities as tolerated     []  Continue plan of care  []  Update interventions per flow sheet       []  Discharge due to:_  []  Other:_      Cabrera Lakhani 11/27/2017  1:31 PM No

## 2020-07-06 DIAGNOSIS — H25.12 AGE-RELATED NUCLEAR CATARACT, LEFT EYE: ICD-10-CM

## 2020-07-06 DIAGNOSIS — E78.00 PURE HYPERCHOLESTEROLEMIA, UNSPECIFIED: ICD-10-CM

## 2020-07-06 DIAGNOSIS — I10 ESSENTIAL (PRIMARY) HYPERTENSION: ICD-10-CM

## 2020-07-06 DIAGNOSIS — Z79.82 LONG TERM (CURRENT) USE OF ASPIRIN: ICD-10-CM

## 2020-08-05 NOTE — DISCHARGE NOTE ADULT - NS AS DC FU [facility]H EJECTION FRACTION >40 NO
Report called to Alannah JACQUES on [facility].  
Test Reason : 

Blood Pressure : ***/*** mmHG

Vent. Rate : 061 BPM     Atrial Rate : 061 BPM

   P-R Int : 200 ms          QRS Dur : 120 ms

    QT Int : 448 ms       P-R-T Axes : 024 -66 023 degrees

   QTc Int : 450 ms

 

Atrial-sensed ventricular-paced rhythm

ABNORMAL ECG

WHEN COMPARED WITH ECG OF 29-JAN-2020 13:55,

VENT. RATE HAS DECREASED BY   4 BPM

Confirmed by Jose Lopez ([id number]) on 8/5/2020 9:09:39 AM

 

Referred By:             Confirmed By:Jose Lopez
severely reduced LV function

## 2022-09-26 ENCOUNTER — APPOINTMENT (OUTPATIENT)
Dept: ORTHOPEDIC SURGERY | Facility: CLINIC | Age: 87
End: 2022-09-26

## 2022-09-26 VITALS — HEIGHT: 62 IN | WEIGHT: 150 LBS | BODY MASS INDEX: 27.6 KG/M2

## 2022-09-26 DIAGNOSIS — M67.442 GANGLION, LEFT HAND: ICD-10-CM

## 2022-09-26 PROCEDURE — 99202 OFFICE O/P NEW SF 15 MIN: CPT

## 2022-09-26 NOTE — PHYSICAL EXAM
[de-identified] :   Patient has a mass present across the dorsal aspect of the CMC joint.  Is consistent with a ganglion cyst.  There is good range of motion to the fingers wrist and.  There is no redness or drainage there is no instability.

## 2022-09-26 NOTE — ASSESSMENT
[FreeTextEntry1] :   Patient has a mass present across the left wrist area the CMC joint consistent with a ganglion cyst.  With pressure on the cystic area that cyst actually got small no surgical intervention will be performed.  She does not require aspiration.  She will see me back on an as-needed basis.

## 2022-09-26 NOTE — HISTORY OF PRESENT ILLNESS
[de-identified] : 89-year-old female with left-sided hand mass.  She had developed swelling at the base of the thumb.  Id there was no specific trauma.  She comes in for evaluation today.

## 2022-10-17 NOTE — PROGRESS NOTE ADULT - SUBJECTIVE AND OBJECTIVE BOX
EGD complete, photos taken, no specimens taken    pt tolerated procedure well   20 Spanish peg tube placed a 3 .5 cm skin level   Site looks good dressing and atb ointment applied to site   Scope Number  [id number] used. Patient was seen and examined. Spoke with RN. Chart reviewed.    No events overnight.  Vital Signs Last 24 Hrs  T(F): 98 (01 Aug 2018 05:49), Max: 98.1 (31 Jul 2018 21:04)  HR: 67 (01 Aug 2018 05:49) (67 - 76)  BP: 151/71 (01 Aug 2018 05:49) (114/62 - 151/71)  SpO2: 95% (01 Aug 2018 01:58) (95% - 95%)  MEDICATIONS  (STANDING):  aspirin  chewable 81 milliGRAM(s) Oral daily  carvedilol 3.125 milliGRAM(s) Oral every 12 hours  docusate sodium 100 milliGRAM(s) Oral two times a day  enoxaparin Injectable 40 milliGRAM(s) SubCutaneous at bedtime  lisinopril 20 milliGRAM(s) Oral daily  multivitamin 1 Tablet(s) Oral daily  senna 2 Tablet(s) Oral at bedtime  simvastatin 40 milliGRAM(s) Oral at bedtime    MEDICATIONS  (PRN):  acetaminophen   Tablet 650 milliGRAM(s) Oral every 6 hours PRN pain  magnesium hydroxide Suspension 30 milliLiter(s) Oral daily PRN Constipation  oxyCODONE    IR 5 milliGRAM(s) Oral every 6 hours PRN Severe Pain (7 - 10)    Labs:                        12.4   7.54  )-----------( 209      ( 01 Aug 2018 05:40 )             37.3                         13.5   9.72  )-----------( 220      ( 31 Jul 2018 09:43 )             40.9     01 Aug 2018 05:40    138    |  101    |  16     ----------------------------<  120    3.9     |  24     |  0.6    31 Jul 2018 09:43    140    |  101    |  13     ----------------------------<  146    4.3     |  22     |  0.6      Ca    9.0        01 Aug 2018 05:40  Ca    9.1        31 Jul 2018 09:43  Mg     2.1       01 Aug 2018 05:40  Mg     2.0       31 Jul 2018 09:43    TPro  6.1    /  Alb  3.5    /  TBili  0.5    /  DBili  x      /  AST  29     /  ALT  33     /  AlkPhos  51     31 Jul 2018 09:43            Radiology:    General: comfortable, NAD  Neurology: A&Ox3, nonfocal  Head:  Normocephalic, atraumatic  ENT:  Mucosa moist, no ulcerations  Neck:  Supple, no JVD,   Skin: no breakdowns (as per RN)  Resp: CTA B/L  CV: RRR, S1S2,   GI: Soft, NT, bowel sounds  MS: No edema, + peripheral pulses, FROM all 4 extremity

## 2022-12-21 NOTE — PROGRESS NOTE ADULT - ASSESSMENT
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

## 2023-03-13 NOTE — PROGRESS NOTE ADULT - SUBJECTIVE AND OBJECTIVE BOX
See vascular surgery regarding abdominal aortic aneurysm    Obtain PSA this month    Cystoscopy today showed no recurrence of bladder cancer within the bladder.      CT urogram shows no tumors or other concerning structures in kidneys.  You do have benign cyst in the kidneys which do not require intervention.    Follow-up in July for cystoscopy and urinary cytology.    MRI urogram will be done in the future rather than CT because of allergic reaction to contrast despite taking steroids.   Patient was seen and examined. Spoke with RN. Chart reviewed. comf sitting oob  awaiting brace for pt,       No events overnight.  Vital Signs Last 24 Hrs  T(F): 97.1 (30 Jul 2018 12:38), Max: 98.5 (29 Jul 2018 15:13)  HR: 83 (30 Jul 2018 12:38) (73 - 88)  BP: 162/77 (30 Jul 2018 12:38) (130/68 - 192/86)  SpO2: 96% (30 Jul 2018 08:45) (93% - 96%)  MEDICATIONS  (STANDING):  aspirin  chewable 81 milliGRAM(s) Oral daily  docusate sodium 100 milliGRAM(s) Oral two times a day  enoxaparin Injectable 40 milliGRAM(s) SubCutaneous at bedtime  lisinopril 20 milliGRAM(s) Oral daily  multivitamin 1 Tablet(s) Oral daily  senna 2 Tablet(s) Oral at bedtime  simvastatin 40 milliGRAM(s) Oral at bedtime    MEDICATIONS  (PRN):  acetaminophen   Tablet 650 milliGRAM(s) Oral every 6 hours PRN pain  magnesium hydroxide Suspension 30 milliLiter(s) Oral daily PRN Constipation  morphine  - Injectable 2 milliGRAM(s) IV Push five times a day PRN Severe Pain (7 - 10)    Labs:                        12.7   8.61  )-----------( 199      ( 30 Jul 2018 08:18 )             38.5                         12.0   7.61  )-----------( 164      ( 29 Jul 2018 07:28 )             36.1     30 Jul 2018 08:18    140    |  102    |  13     ----------------------------<  134    4.0     |  25     |  0.6    29 Jul 2018 07:28    140    |  104    |  16     ----------------------------<  139    3.7     |  25     |  0.6      Ca    9.2        30 Jul 2018 08:18  Ca    8.7        29 Jul 2018 07:28  Phos  2.6       29 Jul 2018 07:28  Phos  2.2       28 Jul 2018 13:30  Mg     2.0       29 Jul 2018 07:28              Radiology:    General: comfortable, NAD  Neurology: A&Ox3, nonfocal  Head:  Normocephalic, atraumatic  ENT:  Mucosa moist, no ulcerations  Neck:  Supple, no JVD,   Skin: no breakdowns (as per RN)  Resp: CTA B/L  CV: RRR, S1S2,   GI: Soft, NT, bowel sounds  MS: No edema, + peripheral pulses, FROM all 4 extremity

## 2023-04-11 NOTE — ASU PREOP CHECKLIST - BSA (M2)
1.69 Full Thickness Lip Wedge Repair (Flap) Text: Given the location of the defect and the proximity to free margins a full thickness wedge repair was deemed most appropriate.  Using a sterile surgical marker, the appropriate repair was drawn incorporating the defect and placing the expected incisions perpendicular to the vermilion border.  The vermilion border was also meticulously outlined to ensure appropriate reapproximation during the repair.  The area thus outlined was incised through and through with a #15 scalpel blade.  The muscularis and dermis were reaproximated with deep sutures following hemostasis. Care was taken to realign the vermilion border before proceeding with the superficial closure.  Once the vermilion was realigned the superfical and mucosal closure was finished.

## 2023-07-10 NOTE — ED ADULT NURSE NOTE - PRO INTERPRETER NEED 2
[Restricted in physically strenuous activity but ambulatory and able to carry out work of a light or sedentary nature] : Status 1- Restricted in physically strenuous activity but ambulatory and able to carry out work of a light or sedentary nature, e.g., light house work, office work [Normal] : affect appropriate [Mucositis] : no mucositis [Thrush] : no thrush [Vesicles] : no vesicles [de-identified] : EOMI, anicteric  [de-identified] : normal respiratory effort, no audible breath sounds  English

## 2023-07-25 ENCOUNTER — INPATIENT (INPATIENT)
Facility: HOSPITAL | Age: 88
LOS: 1 days | Discharge: HOME | DRG: 312 | End: 2023-07-27
Attending: INTERNAL MEDICINE | Admitting: INTERNAL MEDICINE
Payer: MEDICARE

## 2023-07-25 VITALS
OXYGEN SATURATION: 98 % | RESPIRATION RATE: 18 BRPM | TEMPERATURE: 96 F | DIASTOLIC BLOOD PRESSURE: 67 MMHG | WEIGHT: 145.06 LBS | HEIGHT: 62 IN | HEART RATE: 55 BPM | SYSTOLIC BLOOD PRESSURE: 149 MMHG

## 2023-07-25 DIAGNOSIS — Z90.49 ACQUIRED ABSENCE OF OTHER SPECIFIED PARTS OF DIGESTIVE TRACT: Chronic | ICD-10-CM

## 2023-07-25 DIAGNOSIS — Z98.891 HISTORY OF UTERINE SCAR FROM PREVIOUS SURGERY: Chronic | ICD-10-CM

## 2023-07-25 DIAGNOSIS — R55 SYNCOPE AND COLLAPSE: ICD-10-CM

## 2023-07-25 LAB
ALBUMIN SERPL ELPH-MCNC: 4 G/DL — SIGNIFICANT CHANGE UP (ref 3.5–5.2)
ALP SERPL-CCNC: 72 U/L — SIGNIFICANT CHANGE UP (ref 30–115)
ALT FLD-CCNC: 19 U/L — SIGNIFICANT CHANGE UP (ref 0–41)
ANION GAP SERPL CALC-SCNC: 10 MMOL/L — SIGNIFICANT CHANGE UP (ref 7–14)
AST SERPL-CCNC: 31 U/L — SIGNIFICANT CHANGE UP (ref 0–41)
BASOPHILS # BLD AUTO: 0.04 K/UL — SIGNIFICANT CHANGE UP (ref 0–0.2)
BASOPHILS NFR BLD AUTO: 0.3 % — SIGNIFICANT CHANGE UP (ref 0–1)
BILIRUB SERPL-MCNC: 0.6 MG/DL — SIGNIFICANT CHANGE UP (ref 0.2–1.2)
BUN SERPL-MCNC: 17 MG/DL — SIGNIFICANT CHANGE UP (ref 10–20)
CALCIUM SERPL-MCNC: 9.5 MG/DL — SIGNIFICANT CHANGE UP (ref 8.4–10.5)
CHLORIDE SERPL-SCNC: 98 MMOL/L — SIGNIFICANT CHANGE UP (ref 98–110)
CK MB CFR SERPL CALC: 3.3 NG/ML — SIGNIFICANT CHANGE UP (ref 0.6–6.3)
CK SERPL-CCNC: 102 U/L — SIGNIFICANT CHANGE UP (ref 0–225)
CO2 SERPL-SCNC: 27 MMOL/L — SIGNIFICANT CHANGE UP (ref 17–32)
CREAT SERPL-MCNC: 0.7 MG/DL — SIGNIFICANT CHANGE UP (ref 0.7–1.5)
EGFR: 80 ML/MIN/1.73M2 — SIGNIFICANT CHANGE UP
EOSINOPHIL # BLD AUTO: 0.14 K/UL — SIGNIFICANT CHANGE UP (ref 0–0.7)
EOSINOPHIL NFR BLD AUTO: 1.1 % — SIGNIFICANT CHANGE UP (ref 0–8)
GLUCOSE SERPL-MCNC: 113 MG/DL — HIGH (ref 70–99)
HCT VFR BLD CALC: 42.6 % — SIGNIFICANT CHANGE UP (ref 37–47)
HGB BLD-MCNC: 14.2 G/DL — SIGNIFICANT CHANGE UP (ref 12–16)
IMM GRANULOCYTES NFR BLD AUTO: 0.3 % — SIGNIFICANT CHANGE UP (ref 0.1–0.3)
LYMPHOCYTES # BLD AUTO: 1.97 K/UL — SIGNIFICANT CHANGE UP (ref 1.2–3.4)
LYMPHOCYTES # BLD AUTO: 15 % — LOW (ref 20.5–51.1)
MAGNESIUM SERPL-MCNC: 2 MG/DL — SIGNIFICANT CHANGE UP (ref 1.8–2.4)
MCHC RBC-ENTMCNC: 28.9 PG — SIGNIFICANT CHANGE UP (ref 27–31)
MCHC RBC-ENTMCNC: 33.3 G/DL — SIGNIFICANT CHANGE UP (ref 32–37)
MCV RBC AUTO: 86.6 FL — SIGNIFICANT CHANGE UP (ref 81–99)
MONOCYTES # BLD AUTO: 0.78 K/UL — HIGH (ref 0.1–0.6)
MONOCYTES NFR BLD AUTO: 5.9 % — SIGNIFICANT CHANGE UP (ref 1.7–9.3)
NEUTROPHILS # BLD AUTO: 10.16 K/UL — HIGH (ref 1.4–6.5)
NEUTROPHILS NFR BLD AUTO: 77.4 % — HIGH (ref 42.2–75.2)
NRBC # BLD: 0 /100 WBCS — SIGNIFICANT CHANGE UP (ref 0–0)
PLATELET # BLD AUTO: 229 K/UL — SIGNIFICANT CHANGE UP (ref 130–400)
PMV BLD: 10.2 FL — SIGNIFICANT CHANGE UP (ref 7.4–10.4)
POTASSIUM SERPL-MCNC: 4.7 MMOL/L — SIGNIFICANT CHANGE UP (ref 3.5–5)
POTASSIUM SERPL-SCNC: 4.7 MMOL/L — SIGNIFICANT CHANGE UP (ref 3.5–5)
PROT SERPL-MCNC: 6.7 G/DL — SIGNIFICANT CHANGE UP (ref 6–8)
RBC # BLD: 4.92 M/UL — SIGNIFICANT CHANGE UP (ref 4.2–5.4)
RBC # FLD: 14.2 % — SIGNIFICANT CHANGE UP (ref 11.5–14.5)
SODIUM SERPL-SCNC: 135 MMOL/L — SIGNIFICANT CHANGE UP (ref 135–146)
TROPONIN T SERPL-MCNC: <0.01 NG/ML — SIGNIFICANT CHANGE UP
TROPONIN T SERPL-MCNC: <0.01 NG/ML — SIGNIFICANT CHANGE UP
WBC # BLD: 13.13 K/UL — HIGH (ref 4.8–10.8)
WBC # FLD AUTO: 13.13 K/UL — HIGH (ref 4.8–10.8)

## 2023-07-25 PROCEDURE — 99285 EMERGENCY DEPT VISIT HI MDM: CPT | Mod: 25

## 2023-07-25 PROCEDURE — 84436 ASSAY OF TOTAL THYROXINE: CPT

## 2023-07-25 PROCEDURE — 74176 CT ABD & PELVIS W/O CONTRAST: CPT | Mod: 26,MA

## 2023-07-25 PROCEDURE — 71045 X-RAY EXAM CHEST 1 VIEW: CPT | Mod: 26

## 2023-07-25 PROCEDURE — 80053 COMPREHEN METABOLIC PANEL: CPT

## 2023-07-25 PROCEDURE — 93010 ELECTROCARDIOGRAM REPORT: CPT | Mod: 77,76

## 2023-07-25 PROCEDURE — 83735 ASSAY OF MAGNESIUM: CPT

## 2023-07-25 PROCEDURE — 93306 TTE W/DOPPLER COMPLETE: CPT

## 2023-07-25 PROCEDURE — 97162 PT EVAL MOD COMPLEX 30 MIN: CPT | Mod: GP

## 2023-07-25 PROCEDURE — 71250 CT THORAX DX C-: CPT | Mod: 26,MA

## 2023-07-25 PROCEDURE — 72125 CT NECK SPINE W/O DYE: CPT | Mod: 26,MA

## 2023-07-25 PROCEDURE — 84484 ASSAY OF TROPONIN QUANT: CPT

## 2023-07-25 PROCEDURE — 84443 ASSAY THYROID STIM HORMONE: CPT

## 2023-07-25 PROCEDURE — 82553 CREATINE MB FRACTION: CPT

## 2023-07-25 PROCEDURE — 93005 ELECTROCARDIOGRAM TRACING: CPT

## 2023-07-25 PROCEDURE — 70450 CT HEAD/BRAIN W/O DYE: CPT | Mod: 26,MA

## 2023-07-25 PROCEDURE — 99223 1ST HOSP IP/OBS HIGH 75: CPT

## 2023-07-25 PROCEDURE — 85027 COMPLETE CBC AUTOMATED: CPT

## 2023-07-25 PROCEDURE — 12001 RPR S/N/AX/GEN/TRNK 2.5CM/<: CPT

## 2023-07-25 PROCEDURE — 82550 ASSAY OF CK (CPK): CPT

## 2023-07-25 RX ORDER — SODIUM CHLORIDE 9 MG/ML
500 INJECTION INTRAMUSCULAR; INTRAVENOUS; SUBCUTANEOUS ONCE
Refills: 0 | Status: COMPLETED | OUTPATIENT
Start: 2023-07-25 | End: 2023-07-25

## 2023-07-25 RX ORDER — SIMVASTATIN 20 MG/1
40 TABLET, FILM COATED ORAL AT BEDTIME
Refills: 0 | Status: DISCONTINUED | OUTPATIENT
Start: 2023-07-25 | End: 2023-07-27

## 2023-07-25 RX ORDER — TETANUS TOXOID, REDUCED DIPHTHERIA TOXOID AND ACELLULAR PERTUSSIS VACCINE, ADSORBED 5; 2.5; 8; 8; 2.5 [IU]/.5ML; [IU]/.5ML; UG/.5ML; UG/.5ML; UG/.5ML
0.5 SUSPENSION INTRAMUSCULAR ONCE
Refills: 0 | Status: COMPLETED | OUTPATIENT
Start: 2023-07-25 | End: 2023-07-25

## 2023-07-25 RX ORDER — HEPARIN SODIUM 5000 [USP'U]/ML
5000 INJECTION INTRAVENOUS; SUBCUTANEOUS EVERY 12 HOURS
Refills: 0 | Status: DISCONTINUED | OUTPATIENT
Start: 2023-07-25 | End: 2023-07-27

## 2023-07-25 RX ORDER — SODIUM CHLORIDE 9 MG/ML
1000 INJECTION INTRAMUSCULAR; INTRAVENOUS; SUBCUTANEOUS
Refills: 0 | Status: DISCONTINUED | OUTPATIENT
Start: 2023-07-25 | End: 2023-07-26

## 2023-07-25 RX ORDER — PANTOPRAZOLE SODIUM 20 MG/1
40 TABLET, DELAYED RELEASE ORAL
Refills: 0 | Status: DISCONTINUED | OUTPATIENT
Start: 2023-07-25 | End: 2023-07-27

## 2023-07-25 RX ORDER — LISINOPRIL 2.5 MG/1
20 TABLET ORAL DAILY
Refills: 0 | Status: DISCONTINUED | OUTPATIENT
Start: 2023-07-25 | End: 2023-07-27

## 2023-07-25 RX ORDER — CHLORHEXIDINE GLUCONATE 213 G/1000ML
1 SOLUTION TOPICAL
Refills: 0 | Status: DISCONTINUED | OUTPATIENT
Start: 2023-07-25 | End: 2023-07-27

## 2023-07-25 RX ORDER — ASPIRIN/CALCIUM CARB/MAGNESIUM 324 MG
81 TABLET ORAL DAILY
Refills: 0 | Status: DISCONTINUED | OUTPATIENT
Start: 2023-07-25 | End: 2023-07-27

## 2023-07-25 RX ORDER — ACETAMINOPHEN 500 MG
650 TABLET ORAL EVERY 6 HOURS
Refills: 0 | Status: DISCONTINUED | OUTPATIENT
Start: 2023-07-25 | End: 2023-07-27

## 2023-07-25 RX ADMIN — SIMVASTATIN 40 MILLIGRAM(S): 20 TABLET, FILM COATED ORAL at 22:24

## 2023-07-25 RX ADMIN — Medication 650 MILLIGRAM(S): at 23:26

## 2023-07-25 RX ADMIN — TETANUS TOXOID, REDUCED DIPHTHERIA TOXOID AND ACELLULAR PERTUSSIS VACCINE, ADSORBED 0.5 MILLILITER(S): 5; 2.5; 8; 8; 2.5 SUSPENSION INTRAMUSCULAR at 15:04

## 2023-07-25 RX ADMIN — SODIUM CHLORIDE 500 MILLILITER(S): 9 INJECTION INTRAMUSCULAR; INTRAVENOUS; SUBCUTANEOUS at 15:04

## 2023-07-25 RX ADMIN — SODIUM CHLORIDE 75 MILLILITER(S): 9 INJECTION INTRAMUSCULAR; INTRAVENOUS; SUBCUTANEOUS at 22:24

## 2023-07-25 RX ADMIN — Medication 650 MILLIGRAM(S): at 22:24

## 2023-07-25 NOTE — ED ADULT TRIAGE NOTE - CHIEF COMPLAINT QUOTE
pt states she syncopized, LOC for a few seconds, not on blood thinner. c/o L clavicle pain and hit back of head (with lac)

## 2023-07-25 NOTE — ED PROVIDER NOTE - CARE PLAN
Principal Discharge DX:	Syncope  Secondary Diagnosis:	Atrial flutter  Secondary Diagnosis:	Closed head injury  Secondary Diagnosis:	Scalp laceration   1

## 2023-07-25 NOTE — ED PROVIDER NOTE - OBJECTIVE STATEMENT
89-year-old female non-smoker past medical history of hypertension, dyslipidemia, and [place]'s palsy, ambulates occasionally with a cane, also with arthritis status post kyphoplasty, brought in by daughter for evaluation 4 hours status post fall, patient was drinking the second half of her coffee while standing, think she felt dizzy then found herself on the floor, daughter heard a thump and found patient upstairs laying on her back, no confusion, no incontinence, was able to stand and took a shower to wash her hair at which time daughter noticed bleeding, called PMD who referred to ED.  Patient denies recent illness, denies headache, has some mild chest wall and back pain, does not recall antecedent headache, palpitations, chest pain, or shortness of breath, had a similar episode in [place] attributed to dehydration

## 2023-07-25 NOTE — ED PROVIDER NOTE - PROGRESS NOTE DETAILS
Patient hemodynamically stable in ED, labs and studies appreciated, agrees to stay, discussed with Dr. An intensivist, patient does not require ICU, will admit telemetry

## 2023-07-25 NOTE — ED ADULT NURSE NOTE - NSFALLHARMRISKINTERV_ED_ALL_ED
Assistance OOB with selected safe patient handling equipment if applicable/Communicate risk of Fall with Harm to all staff, patient, and family/Orthostatic vital signs/Provide visual cue: red socks, yellow wristband, yellow gown, etc/Reinforce activity limits and safety measures with patient and family/Bed in lowest position, wheels locked, appropriate side rails in place/Call bell, personal items and telephone in reach/Instruct patient to call for assistance before getting out of bed/chair/stretcher/Non-slip footwear applied when patient is off stretcher/[place] to call system/Physically safe environment - no spills, clutter or unnecessary equipment/Purposeful Proactive Rounding/Room/bathroom lighting operational, light cord in reach

## 2023-07-25 NOTE — H&P ADULT - HISTORY OF PRESENT ILLNESS
A 89 y/o F with pmhx of HTN, HLD, arthritis,  kyphosis, [place] palsy brought in for syncope. Pt reports  as she was making second coffee she fell to floor and passed out around 8 : 30 am. PT reports hitting back of head, and LOC for a minute.  Pt was regain consciousness quickly  and was aware of her surroundings. Pt denies chest pain or palpitation prior to fall. Pt denies dizziness or headache. Pt denies fecal or urinary incontinence. Pt denies bitting her tongue.  PT denies prior falls. Pt reports took her BP meds at 8 a. am lisinopril 20 , coreg 3.25 and HCTZ 12.5 . PT denies any recent change in BP meds. PT denies fever, chills, burning with urination, shortness of breath, diarrhea.  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 Pt reports  as she was making second coffee she fell to floor and passed out around 8 : 30 am. PT reports hitting back of head, and LOC for a minute.  Pt was regain consciousness quickly  and was aware of her surroundings. Pt denies chest pain or palpitation prior to fall. Pt denies dizziness or headache. Pt denies fecal or urinary incontinence. Pt denies bitting her tongue.  PT denies prior falls. Pt reports took her BP meds at 8 a. am lisinopril 20 , coreg 3.25 and HCTZ 12.5 . PT denies any recent change in BP meds. PT denies fever, chills, burning with urination, shortness of breath, diarrhea.     In ED on telemetry found to have 2:1 Atrial flutter, with slow ventricular response.  Fall was not witnessed, daughter heard a thud in the house.

## 2023-07-25 NOTE — ED ADULT NURSE NOTE - CHPI ED NUR SYMPTOMS POS
dizziness, syncopal episode in kitchen. Hit back of head, c/o pain to sternal area and back of neck/PAIN

## 2023-07-25 NOTE — ED PROVIDER NOTE - PHYSICAL EXAMINATION
Vital Signs: I have reviewed the initial vital signs.  Constitutional: well-nourished, appears much younger than stated age, no acute distress  HEENT: NC with superficial laceration to occiput, conj pink op clear  Neck: supple, minimal ttp C7/T1  Cardiovascular: RRR with extrasystoles, +ttp over ribs 2-4 L>R without crepitus or ecchymosis  Respiratory: CTA no w/r/r  Gastrointestinal: +bs, snt/nd  Musculoskeletal: FROM x 4 no c/c/e, pelvis stable, pulses equal calves nontender  Integumentary: warm, dry, no rash  Neurologic: awake, alert, +right CN VII palsy otherwise intact  Psychiatric: appropriate mood, appropriate affect Vital Signs: I have reviewed the initial vital signs.  Constitutional: well-nourished, appears much younger than stated age, no acute distress  HEENT: NC with superficial laceration to occiput, conj pink op clear  Neck: supple, minimal ttp C7/T1  Cardiovascular: RRR with extrasystoles, +ttp over ribs 2-4 L>R without crepitus or ecchymosis  Respiratory: CTA no w/r/r  Gastrointestinal: +bs, snt/nd withreducible ventral hernia  Musculoskeletal: FROM x 4 no c/c/e, pelvis stable, pulses equal calves nontender  Integumentary: warm, dry, no rash  Neurologic: awake, alert, +right CN VII palsy otherwise intact  Psychiatric: appropriate mood, appropriate affect Vital Signs: I have reviewed the initial vital signs.  Constitutional: well-nourished, appears much younger than stated age, no acute distress  HEENT: NC with superficial laceration to occiput, conj pink op clear  Neck: supple, minimal ttp C7/T1  Cardiovascular: irreg irreg, +ttp over ribs 2-4 L>R without crepitus or ecchymosis  Respiratory: CTA no w/r/r  Gastrointestinal: +bs, snt/nd withreducible ventral hernia  Musculoskeletal: FROM x 4 no c/c/e, pelvis stable, pulses equal calves nontender  Integumentary: warm, dry, no rash  Neurologic: awake, alert, +right CN VII palsy otherwise intact  Psychiatric: appropriate mood, appropriate affect

## 2023-07-25 NOTE — H&P ADULT - NSHPPHYSICALEXAM_GEN_ALL_CORE
VITALS:     ICU Vital Signs Last 24 Hrs  T(C): 36.6 (25 Jul 2023 17:37), Max: 36.6 (25 Jul 2023 17:37)  T(F): 97.9 (25 Jul 2023 17:37), Max: 97.9 (25 Jul 2023 17:37)  HR: 58 (25 Jul 2023 17:37) (55 - 58)  BP: 166/77 (25 Jul 2023 17:37) (149/67 - 166/77)  RR: 18 (25 Jul 2023 17:37) (18 - 18)  SpO2: 98% (25 Jul 2023 17:37) (98% - 98%)    O2 Parameters below as of 25 Jul 2023 17:37  Patient On (Oxygen Delivery Method): room air      GENERAL: NAD, lying in bed comfortably  HEAD:  Atraumatic, Normocephalic, left face droop (hx [place] Palsy )   EYES: EOMI, PERRLA, conjunctiva and sclera clear  ENT: Moist mucous membranes  NECK: Supple, No JVD  CHEST/LUNG: chest tenderness to palpation, no crepitus, Clear to auscultation bilaterally; No rales, rhonchi, wheezing, or rubs. Unlabored respirations  HEART: Regular rate and rhythm; No murmurs, rubs, or gallops  ABDOMEN: Bowel sounds present; Soft, Nontender, Nondistended. No hepatomegally  EXTREMITIES:  2+ Peripheral Pulses, brisk capillary refill. No clubbing, cyanosis, or edema  NERVOUS SYSTEM:  Alert & Oriented X3, speech clear. No deficits   MSK: FROM all 4 extremities, full and equal strength  SKIN: No rashes or lesions VITALS:     ICU Vital Signs Last 24 Hrs  T(C): 36.6 (25 Jul 2023 17:37), Max: 36.6 (25 Jul 2023 17:37)  T(F): 97.9 (25 Jul 2023 17:37), Max: 97.9 (25 Jul 2023 17:37)  HR: 58 (25 Jul 2023 17:37) (55 - 58)  BP: 166/77 (25 Jul 2023 17:37) (149/67 - 166/77)  RR: 18 (25 Jul 2023 17:37) (18 - 18)  SpO2: 98% (25 Jul 2023 17:37) (98% - 98%)    O2 Parameters below as of 25 Jul 2023 17:37  Patient On (Oxygen Delivery Method): room air      GENERAL: NAD, lying in bed comfortably  HEAD:  Atraumatic, Normocephalic, left face droop (hx [place] Palsy ) , occipital scalp stapled.   EYES: EOMI, PERRLA, conjunctiva and sclera clear  ENT: Moist mucous membranes  NECK: Supple, No JVD  CHEST/LUNG: chest tenderness to palpation, no crepitus, Clear to auscultation bilaterally; No rales, rhonchi, wheezing, or rubs. Unlabored respirations  HEART: Regular rate and rhythm; No murmurs, rubs, or gallops  ABDOMEN: Bowel sounds present; Soft, Nontender, Nondistended. No hepatomegally  EXTREMITIES:  2+ Peripheral Pulses, brisk capillary refill. No clubbing, cyanosis, or edema  NERVOUS SYSTEM:  Alert & Oriented X3, speech clear. No deficits   MSK: FROM all 4 extremities, full and equal strength  SKIN: No rashes or lesions

## 2023-07-25 NOTE — ED PROVIDER NOTE - CLINICAL SUMMARY MEDICAL DECISION MAKING FREE TEXT BOX
Very spry 89-year-old with scalp lack status post syncope and fall found to be in new onset atrial flutter with variable block, labs and studies appreciated, will admit for telemetry monitoring, will defer anticoagulation to inpatient team

## 2023-07-25 NOTE — H&P ADULT - CONVERSATION DETAILS
Current plan of care including prognosis discussed with patient, all options were discussed and opted for full code with full understanding of what is involved in detail. Daughter at bedside

## 2023-07-25 NOTE — H&P ADULT - ASSESSMENT
A 89 y/o F with pmhx of HTN, HLD, arthritis,  kyphosis, [place] palsy brought in for syncope.     #Syncope 2/2 meds r/o cardiac   EKG aflutter with 2:1   CT head negative   PAN CT scan negative for fracture   [id number] sore 4  -cardiac monitor   -trend cardiac enzymes  -cardiology consult  -cw ASA  -orthostatic vital   -fall precaution   -will hold coreg as pt stephanie at times   -ECHO  -TSH, T4   -gentle IV hydration     #A- flutter with 2: 1  -cardiac monitor  -[id number] sore 4, will hod AC until pt evaluated by cardio  -will hold corg as pt stephanie to 55 HR      #HTN  -hold coreg  -hold HCTZ  -cw lisinopril   -monitor BP     #HLD  cw statin       #scalp laceration   s/p  repair in ED  outpt follow up with pcp for suture removal   pain meds prn     DVT prophylaxis HSQ  GI prophylaxis      A 89 y/o F with pmhx of HTN, HLD, arthritis,  kyphosis, [place] palsy brought in for syncope.     #Syncope 2/2 possible meds r/o cardiac issues like pauses.   - telemetry with  2:1 , EKG revealed first degree AV block.   CT head negative   PAN CT scan negative for fracture   [id number] sore 4, hold off on AC given fall and subq occipital hematoma.   -cardiac monitor   - trop x 1 -ve, trend cardiac enzymes  -cardiology consult  -cw ASA  -orthostatic vital   -fall precaution   -will hold coreg as pt stephanie at times   -ECHO  -TSH, T4   -gentle IV hydration   - Pt decline by ICU.       #A- flutter with 2: 1  -cardiac monitor  -[id number] sore 4, will hod AC until pt evaluated by cardio  -will hold coreg as pt stephanie to 55 HR      #HTN  -hold coreg  -hold HCTZ  -cw lisinopril   -monitor BP     #HLD  cw statin       DVT prophylaxis HSQ  GI prophylaxis ppi po     Full code

## 2023-07-25 NOTE — PATIENT PROFILE ADULT - FALL HARM RISK - HARM RISK INTERVENTIONS

## 2023-07-25 NOTE — H&P ADULT - NSHPLABSRESULTS_GEN_ALL_CORE
14.2   13.13 )-----------( 229      ( 25 Jul 2023 15:28 )             42.6       07-25    135  |  98  |  17  ----------------------------<  113<H>  4.7   |  27  |  0.7    Ca    9.5      25 Jul 2023 15:28  Mg     2.0     07-25    TPro  6.7  /  Alb  4.0  /  TBili  0.6  /  DBili  x   /  AST  31  /  ALT  19  /  AlkPhos  72  07-25          Magnesium: 2.0 mg/dL (07-25-23 @ 15:28)          Urinalysis Basic - ( 25 Jul 2023 15:28 )    Color: x / Appearance: x / SG: x / pH: x  Gluc: 113 mg/dL / Ketone: x  / Bili: x / Urobili: x   Blood: x / Protein: x / Nitrite: x   Leuk Esterase: x / RBC: x / WBC x   Sq Epi: x / Non Sq Epi: x / Bacteria: x    Lactate Trend      CARDIAC MARKERS ( 25 Jul 2023 15:17 )  x     / <0.01 ng/mL / x     / x     / x          < from: CT Abdomen and Pelvis No Cont (07.25.23 @ 15:49) >      IMPRESSION:    1. Since July 27, 2018, no definite evidence of acute traumatic injury   within the chest, abdomen or pelvis.  2. Increased severe compression deformity of the T12 vertebral body with   post-kyphoplasty changes.  3. Sigmoid and descending colonic diverticulosis, without evidence of   acute diverticulitis.    --- End of Report ---    VILMA SWIFT MD; Attending Radiologist  This document has been electronically signed. Jul 25 2023  5:36PM    < end of copied text >      < from: CT Cervical Spine No Cont (07.25.23 @ 15:49) >    IMPRESSION:    1.  No evidence of acute cervical spine fracture or subluxation.    2.  Diffuse osteopenia and multilevel degenerative changes as described.    --- End of Report ---    JAJA FREEMAN MD; Attending Radiologist  This document has been electronically signed. Jul 25 2023  3:59PM    < end of copied text >    < from: CT Head No Cont (07.25.23 @ 15:49) >      Findings:    The ventricles and cortical sulci are within normal limits for age.    There are patchy hypodensities throughout the hemispheric white matter   without mass effect compatible with mild chronic microvascular changes.    There is no acute intracranial hemorrhage, extra-axial fluid collection   or midline shift.  Gray white matter differentiation is maintained.    Vascular calcifications are noted.    The visualized paranasal sinuses and mastoids are clear.    IMPRESSION:    No evidence of acute intracranial pathology.    --- End of Report ---

## 2023-07-26 LAB
ALBUMIN SERPL ELPH-MCNC: 3.6 G/DL — SIGNIFICANT CHANGE UP (ref 3.5–5.2)
ALP SERPL-CCNC: 72 U/L — SIGNIFICANT CHANGE UP (ref 30–115)
ALT FLD-CCNC: 16 U/L — SIGNIFICANT CHANGE UP (ref 0–41)
ANION GAP SERPL CALC-SCNC: 11 MMOL/L — SIGNIFICANT CHANGE UP (ref 7–14)
AST SERPL-CCNC: 18 U/L — SIGNIFICANT CHANGE UP (ref 0–41)
BILIRUB SERPL-MCNC: 0.4 MG/DL — SIGNIFICANT CHANGE UP (ref 0.2–1.2)
BUN SERPL-MCNC: 11 MG/DL — SIGNIFICANT CHANGE UP (ref 10–20)
CALCIUM SERPL-MCNC: 9.5 MG/DL — SIGNIFICANT CHANGE UP (ref 8.4–10.5)
CHLORIDE SERPL-SCNC: 106 MMOL/L — SIGNIFICANT CHANGE UP (ref 98–110)
CK MB CFR SERPL CALC: 3.1 NG/ML — SIGNIFICANT CHANGE UP (ref 0.6–6.3)
CK SERPL-CCNC: 80 U/L — SIGNIFICANT CHANGE UP (ref 0–225)
CO2 SERPL-SCNC: 25 MMOL/L — SIGNIFICANT CHANGE UP (ref 17–32)
CREAT SERPL-MCNC: 0.6 MG/DL — LOW (ref 0.7–1.5)
EGFR: 83 ML/MIN/1.73M2 — SIGNIFICANT CHANGE UP
GLUCOSE SERPL-MCNC: 124 MG/DL — HIGH (ref 70–99)
HCT VFR BLD CALC: 41.8 % — SIGNIFICANT CHANGE UP (ref 37–47)
HGB BLD-MCNC: 13.5 G/DL — SIGNIFICANT CHANGE UP (ref 12–16)
MAGNESIUM SERPL-MCNC: 2 MG/DL — SIGNIFICANT CHANGE UP (ref 1.8–2.4)
MCHC RBC-ENTMCNC: 28.3 PG — SIGNIFICANT CHANGE UP (ref 27–31)
MCHC RBC-ENTMCNC: 32.3 G/DL — SIGNIFICANT CHANGE UP (ref 32–37)
MCV RBC AUTO: 87.6 FL — SIGNIFICANT CHANGE UP (ref 81–99)
NRBC # BLD: 0 /100 WBCS — SIGNIFICANT CHANGE UP (ref 0–0)
PLATELET # BLD AUTO: 182 K/UL — SIGNIFICANT CHANGE UP (ref 130–400)
PMV BLD: 10.3 FL — SIGNIFICANT CHANGE UP (ref 7.4–10.4)
POTASSIUM SERPL-MCNC: 3.9 MMOL/L — SIGNIFICANT CHANGE UP (ref 3.5–5)
POTASSIUM SERPL-SCNC: 3.9 MMOL/L — SIGNIFICANT CHANGE UP (ref 3.5–5)
PROT SERPL-MCNC: 6 G/DL — SIGNIFICANT CHANGE UP (ref 6–8)
RBC # BLD: 4.77 M/UL — SIGNIFICANT CHANGE UP (ref 4.2–5.4)
RBC # FLD: 14.4 % — SIGNIFICANT CHANGE UP (ref 11.5–14.5)
SODIUM SERPL-SCNC: 142 MMOL/L — SIGNIFICANT CHANGE UP (ref 135–146)
T4 AB SER-ACNC: 7.7 UG/DL — SIGNIFICANT CHANGE UP (ref 4.6–12)
TROPONIN T SERPL-MCNC: <0.01 NG/ML — SIGNIFICANT CHANGE UP
TSH SERPL-MCNC: 3.06 UIU/ML — SIGNIFICANT CHANGE UP (ref 0.27–4.2)
WBC # BLD: 8.34 K/UL — SIGNIFICANT CHANGE UP (ref 4.8–10.8)
WBC # FLD AUTO: 8.34 K/UL — SIGNIFICANT CHANGE UP (ref 4.8–10.8)

## 2023-07-26 PROCEDURE — 99233 SBSQ HOSP IP/OBS HIGH 50: CPT

## 2023-07-26 PROCEDURE — 99222 1ST HOSP IP/OBS MODERATE 55: CPT

## 2023-07-26 PROCEDURE — 93306 TTE W/DOPPLER COMPLETE: CPT | Mod: 26

## 2023-07-26 PROCEDURE — 93010 ELECTROCARDIOGRAM REPORT: CPT

## 2023-07-26 RX ORDER — HYDRALAZINE HCL 50 MG
10 TABLET ORAL ONCE
Refills: 0 | Status: COMPLETED | OUTPATIENT
Start: 2023-07-26 | End: 2023-07-26

## 2023-07-26 RX ORDER — CARVEDILOL PHOSPHATE 80 MG/1
3.12 CAPSULE, EXTENDED RELEASE ORAL EVERY 12 HOURS
Refills: 0 | Status: DISCONTINUED | OUTPATIENT
Start: 2023-07-26 | End: 2023-07-27

## 2023-07-26 RX ADMIN — PANTOPRAZOLE SODIUM 40 MILLIGRAM(S): 20 TABLET, DELAYED RELEASE ORAL at 05:44

## 2023-07-26 RX ADMIN — CARVEDILOL PHOSPHATE 3.12 MILLIGRAM(S): 80 CAPSULE, EXTENDED RELEASE ORAL at 17:30

## 2023-07-26 RX ADMIN — Medication 650 MILLIGRAM(S): at 05:41

## 2023-07-26 RX ADMIN — SIMVASTATIN 40 MILLIGRAM(S): 20 TABLET, FILM COATED ORAL at 20:28

## 2023-07-26 RX ADMIN — HEPARIN SODIUM 5000 UNIT(S): 5000 INJECTION INTRAVENOUS; SUBCUTANEOUS at 17:29

## 2023-07-26 RX ADMIN — Medication 10 MILLIGRAM(S): at 22:07

## 2023-07-26 RX ADMIN — Medication 650 MILLIGRAM(S): at 04:40

## 2023-07-26 RX ADMIN — Medication 81 MILLIGRAM(S): at 11:44

## 2023-07-26 RX ADMIN — LISINOPRIL 20 MILLIGRAM(S): 2.5 TABLET ORAL at 05:44

## 2023-07-26 RX ADMIN — HEPARIN SODIUM 5000 UNIT(S): 5000 INJECTION INTRAVENOUS; SUBCUTANEOUS at 05:44

## 2023-07-26 NOTE — PHYSICAL THERAPY INITIAL EVALUATION ADULT - ADDITIONAL COMMENTS
pt lives with her daughter in a private house with 6 steps to enter and 13 steps to get to bedroom and bathroom area with railing.  Pt was independent community ambulator prior to admission without AD.  Pt has SC ans RW at home, but is not using at this time

## 2023-07-26 NOTE — PHYSICAL THERAPY INITIAL EVALUATION ADULT - GENERAL OBSERVATIONS, REHAB EVAL
13;50-14;15 pt was seen for PT IE at bed side, pt is agreeable, chart thoroughly reviewed, RN Mildred is aware.  Pt was received semi cespedes in bed, in no apparent distress, +hep lock, +call bell within reach, bed side table at reach, daughter is at bed side

## 2023-07-26 NOTE — CONSULT NOTE ADULT - SUBJECTIVE AND OBJECTIVE BOX
CARDIOLOGY CONSULT NOTE     CHIEF COMPLAINT/REASON FOR CONSULT:    HPI:  A 89 y/o F with pmhx of 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 Pt reports  as she was making second coffee she fell to floor and passed out around 8 : 30 am. PT reports hitting back of head, and LOC for a minute.  Pt was regain consciousness quickly  and was aware of her surroundings. Pt denies chest pain or palpitation prior to fall. Pt denies dizziness or headache. Pt denies fecal or urinary incontinence. Pt denies bitting her tongue.  PT denies prior falls. Pt reports took her BP meds at 8 a. am lisinopril 20 , coreg 3.25 and HCTZ 12.5 . PT denies any recent change in BP meds. PT denies fever, chills, burning with urination, shortness of breath, diarrhea.     In ED on telemetry found to have 2:1 Atrial flutter, with slow ventricular response.  Fall was not witnessed, daughter heard a thud in the house.  (2023 18:35)      PAST MEDICAL & SURGICAL HISTORY:  Hypertension      High cholesterol      Arthritis      S/P cholecystectomy      History of           Cardiac Risks:   [x ]HTN, [ ] DM, [ ] Smoking, [ ] FH,  [x ] Lipids        MEDICATIONS:  MEDICATIONS  (STANDING):  aspirin enteric coated 81 milliGRAM(s) Oral daily  chlorhexidine 2% Cloths 1 Application(s) Topical <User Schedule>  heparin   Injectable 5000 Unit(s) SubCutaneous every 12 hours  lisinopril 20 milliGRAM(s) Oral daily  pantoprazole    Tablet 40 milliGRAM(s) Oral before breakfast  simvastatin 40 milliGRAM(s) Oral at bedtime  sodium chloride 0.9%. 1000 milliLiter(s) (75 mL/Hr) IV Continuous <Continuous>      FAMILY HISTORY:  No pertinent family history in first degree relatives        SOCIAL HISTORY:        Allergies    No Known Allergies      	    REVIEW OF SYSTEMS:  CONSTITUTIONAL: No fever, weight loss, or fatigue  EYES: No eye pain, visual disturbances, or discharge  ENMT:  No difficulty hearing, tinnitus, vertigo; No sinus or throat pain  NECK: No pain or stiffness  RESPIRATORY: No cough, wheezing, chills or hemoptysis; No Shortness of Breath  CARDIOVASCULAR: See above  GASTROINTESTINAL: No abdominal or epigastric pain. No nausea, vomiting, or hematemesis; No diarrhea or constipation. No melena or hematochezia.  GENITOURINARY: No dysuria, frequency, hematuria, or incontinence  NEUROLOGICAL: No headaches, memory loss, loss of strength, numbness, or tremors  SKIN: No itching, burning, rashes, or lesions   	      PHYSICAL EXAM:  T(C): 36.1 (23 @ 22:06), Max: 36.6 (23 @ 17:37)  HR: 72 (23 @ 23:30) (55 - 75)  BP: 119/60 (23 @ 23:30) (119/60 - 188/78)  RR: 18 (23 @ 22:06) (18 - 18)  SpO2: 95% (23 @ 22:06) (95% - 98%)  Wt(kg): --  I&O's Summary    2023 07:01  -  2023 07:00  --------------------------------------------------------  IN: 0 mL / OUT: 1200 mL / NET: -1200 mL        Appearance: Normal	  Psychiatry: A & O x 3, Mood & affect appropriate  HEENT:   Normal oral mucosa, PERRL, EOMI	  Lymphatic: No lymphadenopathy  Cardiovascular: Normal S1 S2,RRR, No JVD, i/v1 stephany lsb  Respiratory: Lungs clear to auscultation	  Gastrointestinal:  Soft, Non-tender, + BS	  Skin: No rashes, No ecchymoses, No cyanosis	  Neurologic: Non-focal  Extremities: Normal range of motion, No clubbing, cyanosis or edema  Vascular: Peripheral pulses palpable 2+ bilaterally      ECG:  	< from: 12 Lead ECG (23 @ 15:56) >    Diagnosis Line Atrial fibrillation  Left axis deviation  Anterior infarct ,age undetermined  Abnormal ECG    Confirmed by MARYELLEN AL MD ([id number]) on 2023 7:07:26 AM    < end of copied text >      	  LABS:	 	    CARDIAC MARKERS:                                    13.5   8.34  )-----------( 182      ( 2023 07:30 )             41.8     07    135  |  98  |  17  ----------------------------<  113<H>  4.7   |  27  |  0.7    Ca    9.5      2023 15:28  Mg     2.0         TPro  6.7  /  Alb  4.0  /  TBili  0.6  /  DBili  x   /  AST  31  /  ALT  19  /  AlkPhos  72
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
## 2023-07-26 NOTE — PROGRESS NOTE ADULT - SUBJECTIVE AND OBJECTIVE BOX
Progress note    INTERVAL HPI/OVERNIGHT EVENTS:    Patient seen and examined at bedside. She denies any chest pain, shortness of breath, nausea or vomiting.      REVIEW OF SYSTEMS:  All other 13 Review of systems were reviewed and are negative    FAMILY HISTORY:  No pertinent family history in first degree relatives      T(C): 36.1 (07-25-23 @ 22:06), Max: 36.6 (07-25-23 @ 17:37)  HR: 72 (07-25-23 @ 23:30) (55 - 75)  BP: 119/60 (07-25-23 @ 23:30) (119/60 - 188/78)  RR: 18 (07-25-23 @ 22:06) (18 - 18)  SpO2: 95% (07-25-23 @ 22:06) (95% - 98%)  Wt(kg): --Vital Signs Last 24 Hrs  T(C): 36.1 (25 Jul 2023 22:06), Max: 36.6 (25 Jul 2023 17:37)  T(F): 97 (25 Jul 2023 22:06), Max: 97.9 (25 Jul 2023 17:37)  HR: 72 (25 Jul 2023 23:30) (55 - 75)  BP: 119/60 (25 Jul 2023 23:30) (119/60 - 188/78)  BP(mean): --  RR: 18 (25 Jul 2023 22:06) (18 - 18)  SpO2: 95% (25 Jul 2023 22:06) (95% - 98%)    Parameters below as of 25 Jul 2023 21:15  Patient On (Oxygen Delivery Method): room air      No Known Allergies      PHYSICAL EXAM:    GENERAL: NAD, lying in bed comfortably  HEAD:  Atraumatic, Normocephalic, left face droop (hx [place] Palsy ) , occipital scalp stapled.   EYES: EOMI, PERRLA, conjunctiva and sclera clear  ENT: Moist mucous membranes  NECK: Supple, No JVD  CHEST/LUNG: no crepitus, Clear to auscultation bilaterally; No rales, rhonchi, wheezing, or rubs. Unlabored respirations  HEART: Regular rate and rhythm; No murmurs, rubs, or gallops  ABDOMEN: Bowel sounds present; Soft, Nontender, Nondistended. No hepatomegally  EXTREMITIES:  2+ Peripheral Pulses, brisk capillary refill. No clubbing, cyanosis, or edema  NERVOUS SYSTEM:  Alert & Oriented X3, speech clear. No deficits   MSK: FROM all 4 extremities, full and equal strength  SKIN: No rashes or lesions    Consultant(s) Notes Reviewed:  [x ] YES  [ ] NO  Care Discussed with Consultants/Other Providers [ x] YES  [ ] NO    LABS:      RADIOLOGY & ADDITIONAL TESTS:    Imaging Personally Reviewed:  [ ] YES  [ ] NO  acetaminophen     Tablet .. 650 milliGRAM(s) Oral every 6 hours PRN  aspirin enteric coated 81 milliGRAM(s) Oral daily  chlorhexidine 2% Cloths 1 Application(s) Topical <User Schedule>  heparin   Injectable 5000 Unit(s) SubCutaneous every 12 hours  lisinopril 20 milliGRAM(s) Oral daily  pantoprazole    Tablet 40 milliGRAM(s) Oral before breakfast  simvastatin 40 milliGRAM(s) Oral at bedtime      HEALTH ISSUES - PROBLEM Dx:    A 89 y/o F with pmhx of HTN, HLD, arthritis,  kyphosis, [place] palsy brought in for syncope.     #Syncope 2/2 possible meds r/o cardiac issues like pauses.   - telemetry with  2:1, EKG revealed first degree AV block.   CT head negative   PAN CT scan negative for fracture   [id number] sore 4, hold off on AC given fall and subq occipital hematoma.   - trop x 3 (-)  -cardiology consult recs appreciated  -cw ASA  -orthostatic vital (-)  -fall precaution   -Resume Coreg  -ECHO LVEF 68%  -TSH wnl, T4   -s/p IVF    #A- flutter with 2: 1  -cardiac monitor  -[id number] sore 4, will hod AC until pt evaluated by cardio  -Resume coreg    #HTN  -c/w coreg  -hold HCTZ  -cw lisinopril     #HLD  cw statin     Dispo: needs outpatient MCOT, prepare for dc in am    Total time spent to complete patient's bedside assessment, review medical chart, discuss medical plan of care with covering medical team was ____35____ with > 50% of time spent face to face w/ patient, discussion with patient/family and/or coordination of care Progress note    INTERVAL HPI/OVERNIGHT EVENTS:    Patient seen and examined at bedside. She denies any chest pain, shortness of breath, nausea or vomiting.      REVIEW OF SYSTEMS:  All other 13 Review of systems were reviewed and are negative    FAMILY HISTORY:  No pertinent family history in first degree relatives      T(C): 36.1 (07-25-23 @ 22:06), Max: 36.6 (07-25-23 @ 17:37)  HR: 72 (07-25-23 @ 23:30) (55 - 75)  BP: 119/60 (07-25-23 @ 23:30) (119/60 - 188/78)  RR: 18 (07-25-23 @ 22:06) (18 - 18)  SpO2: 95% (07-25-23 @ 22:06) (95% - 98%)  Wt(kg): --Vital Signs Last 24 Hrs  T(C): 36.1 (25 Jul 2023 22:06), Max: 36.6 (25 Jul 2023 17:37)  T(F): 97 (25 Jul 2023 22:06), Max: 97.9 (25 Jul 2023 17:37)  HR: 72 (25 Jul 2023 23:30) (55 - 75)  BP: 119/60 (25 Jul 2023 23:30) (119/60 - 188/78)  BP(mean): --  RR: 18 (25 Jul 2023 22:06) (18 - 18)  SpO2: 95% (25 Jul 2023 22:06) (95% - 98%)    Parameters below as of 25 Jul 2023 21:15  Patient On (Oxygen Delivery Method): room air      No Known Allergies      PHYSICAL EXAM:    GENERAL: NAD, lying in bed comfortably  HEAD:  Atraumatic, Normocephalic, left face droop (hx [place] Palsy ) , occipital scalp stapled.   EYES: EOMI, PERRLA, conjunctiva and sclera clear  ENT: Moist mucous membranes  NECK: Supple, No JVD  CHEST/LUNG: no crepitus, Clear to auscultation bilaterally; No rales, rhonchi, wheezing, or rubs. Unlabored respirations  HEART: Regular rate and rhythm; No murmurs, rubs, or gallops  ABDOMEN: Bowel sounds present; Soft, Nontender, Nondistended. No hepatomegally  EXTREMITIES:  2+ Peripheral Pulses, brisk capillary refill. No clubbing, cyanosis, or edema  NERVOUS SYSTEM:  Alert & Oriented X3, speech clear. No deficits   MSK: FROM all 4 extremities, full and equal strength  SKIN: No rashes or lesions    Consultant(s) Notes Reviewed:  [x ] YES  [ ] NO  Care Discussed with Consultants/Other Providers [ x] YES  [ ] NO    LABS:      RADIOLOGY & ADDITIONAL TESTS:    Imaging Personally Reviewed:  [ ] YES  [ ] NO  acetaminophen     Tablet .. 650 milliGRAM(s) Oral every 6 hours PRN  aspirin enteric coated 81 milliGRAM(s) Oral daily  chlorhexidine 2% Cloths 1 Application(s) Topical <User Schedule>  heparin   Injectable 5000 Unit(s) SubCutaneous every 12 hours  lisinopril 20 milliGRAM(s) Oral daily  pantoprazole    Tablet 40 milliGRAM(s) Oral before breakfast  simvastatin 40 milliGRAM(s) Oral at bedtime      HEALTH ISSUES - PROBLEM Dx:    A 89 y/o F with pmhx of HTN, HLD, arthritis,  kyphosis, [place] palsy brought in for syncope.     #Syncope 2/2 possible meds r/o cardiac issues like pauses.   - telemetry with  2:1, EKG revealed first degree AV block.   CT head negative   PAN CT scan negative for fracture   [id number] sore 4, hold off on AC given fall and subq occipital hematoma.   - trop x 3 (-)  -cardiology consult recs appreciated  -cw ASA  -orthostatic vital (-)  -fall precaution   -Resume Coreg  -ECHO LVEF 68%  -TSH wnl, T4   -s/p IVF    #A- flutter with 2: 1  -cardiac monitor  -[id number] sore 4, will hod AC until pt evaluated by cardio  -Resume coreg    #HTN  -c/w coreg  -hold HCTZ  -cw lisinopril     #HLD  cw statin     #Obesity  -BMI 36    I had a long discussion regarding risks and benefits regarding pros and cons of starting AC for A-flutter. I explained that patient may have CVA if AC is not started and I also explained that patient with falls may be at risk for intracranial bleed if she falls. At this time, they would like to think about it and let me know tomorrow.    Dispo: needs outpatient MCOT, prepare for dc in am  PT eval pending

## 2023-07-26 NOTE — CONSULT NOTE ADULT - ASSESSMENT
Ptient 89 yo hx htn hld. She had second cup coffee. She fell hit head. She claims no LOC. No chest pain sob or palpitations. She was nsr. Now aflutter, Check ortho bp. I agree hold HCTZ. Afib flutter rate appears controlled. CONSUELO vasc 4. AC if candidate.  Ambulate with assistance. Consider out patient 30 day MCOT.

## 2023-07-26 NOTE — PHYSICAL THERAPY INITIAL EVALUATION ADULT - PERTINENT HX OF CURRENT PROBLEM, REHAB EVAL
A 89 y/o F with pmhx of HTN, HLD, arthritis,  kyphosis, [place] palsy brought in for syncope.  #Syncope 2/2 possible meds r/o cardiac issues like pauses.

## 2023-07-27 ENCOUNTER — TRANSCRIPTION ENCOUNTER (OUTPATIENT)
Age: 88
End: 2023-07-27

## 2023-07-27 VITALS
TEMPERATURE: 97 F | DIASTOLIC BLOOD PRESSURE: 69 MMHG | RESPIRATION RATE: 16 BRPM | HEART RATE: 57 BPM | SYSTOLIC BLOOD PRESSURE: 127 MMHG | OXYGEN SATURATION: 97 %

## 2023-07-27 PROCEDURE — 99239 HOSP IP/OBS DSCHRG MGMT >30: CPT

## 2023-07-27 RX ORDER — METOPROLOL TARTRATE 50 MG
1 TABLET ORAL
Qty: 60 | Refills: 0
Start: 2023-07-27

## 2023-07-27 RX ORDER — APIXABAN 2.5 MG/1
1 TABLET, FILM COATED ORAL
Qty: 60 | Refills: 0
Start: 2023-07-27 | End: 2023-08-25

## 2023-07-27 RX ORDER — METOPROLOL TARTRATE 50 MG
1 TABLET ORAL
Qty: 60 | Refills: 0
Start: 2023-07-27 | End: 2023-08-25

## 2023-07-27 RX ORDER — APIXABAN 2.5 MG/1
1 TABLET, FILM COATED ORAL
Qty: 60 | Refills: 0
Start: 2023-07-27

## 2023-07-27 RX ADMIN — CARVEDILOL PHOSPHATE 3.12 MILLIGRAM(S): 80 CAPSULE, EXTENDED RELEASE ORAL at 06:23

## 2023-07-27 RX ADMIN — Medication 81 MILLIGRAM(S): at 11:03

## 2023-07-27 RX ADMIN — LISINOPRIL 20 MILLIGRAM(S): 2.5 TABLET ORAL at 06:24

## 2023-07-27 RX ADMIN — HEPARIN SODIUM 5000 UNIT(S): 5000 INJECTION INTRAVENOUS; SUBCUTANEOUS at 06:24

## 2023-07-27 RX ADMIN — PANTOPRAZOLE SODIUM 40 MILLIGRAM(S): 20 TABLET, DELAYED RELEASE ORAL at 06:24

## 2023-07-27 NOTE — DISCHARGE NOTE NURSING/CASE MANAGEMENT/SOCIAL WORK - PATIENT PORTAL LINK FT
You can access the FollowMyHealth Patient Portal offered by [facility] by registering at the following website: [email or web address]. By joining [facility]’s FollowMyHealth portal, you will also be able to view your health information using other applications (apps) compatible with our system.

## 2023-07-27 NOTE — DISCHARGE NOTE PROVIDER - NSDCMRMEDTOKEN_GEN_ALL_CORE_FT
acetaminophen 325 mg oral tablet: 2 tab(s) orally every 6 hours, As needed, for pain or fever  aspirin 81 mg oral tablet, chewable: 1 tab(s) orally once a day  carvedilol 3.125 mg oral tablet: 1 tab(s) orally every 12 hours  hydroCHLOROthiazide 12.5 mg oral capsule: 1 cap(s) orally once a day   lisinopril 20 mg oral tablet: 1 tab(s) orally once a day  simvastatin 40 mg oral tablet: 1 tab(s) orally once a day (at bedtime)   acetaminophen 325 mg oral tablet: 2 tab(s) orally every 6 hours, As needed, for pain or fever  aspirin 81 mg oral tablet, chewable: 1 tab(s) orally once a day  Eliquis 5 mg oral tablet: 1 tab(s) orally every 12 hours  lisinopril 20 mg oral tablet: 1 tab(s) orally once a day  metoprolol tartrate 25 mg oral tablet: 1 tab(s) orally every 12 hours  simvastatin 40 mg oral tablet: 1 tab(s) orally once a day (at bedtime)

## 2023-07-27 NOTE — DISCHARGE NOTE NURSING/CASE MANAGEMENT/SOCIAL WORK - NSDCPEFALRISK_GEN_ALL_CORE
For information on Fall & Injury Prevention, visit: [email or web address] OR  [email or web address] OR  https://www.cdc.gov/steadi/patient.html

## 2023-07-27 NOTE — DISCHARGE NOTE NURSING/CASE MANAGEMENT/SOCIAL WORK - NSDCPEELIQUISFU_GEN_ALL_CORE
Go for blood tests as directed. Your doctor will do lab tests at regular visits to monitor the effects of this medicine. Please follow up with your doctor and keep your health care provider appointments. Cigarettes

## 2023-07-27 NOTE — DISCHARGE NOTE PROVIDER - HOSPITAL COURSE
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 Pt reports  as she was making second coffee she fell to floor and passed out around 8 : 30 am. PT reports hitting back of head, and LOC for a minute.  Pt was regain consciousness quickly  and was aware of her surroundings. Pt denies chest pain or palpitation prior to fall. Pt denies dizziness or headache. Pt denies fecal or urinary incontinence. Pt denies bitting her tongue.  PT denies prior falls. Pt reports took her BP meds at 8 a. am lisinopril 20 , coreg 3.25 and HCTZ 12.5 . PT denies any recent change in BP meds. PT denies fever, chills, burning with urination, shortness of breath, diarrhea. Patient was diagnosed with syncope    #Syncope 2/2 possible meds r/o cardiac issues like pauses.   - telemetry with  2:1, EKG revealed first degree AV block.   CT head negative   PAN CT scan negative for fracture   [id number] sore 4, hold off on AC given fall and subq occipital hematoma.   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 Pt reports  as she was making second coffee she fell to floor and passed out around 8 : 30 am. PT reports hitting back of head, and LOC for a minute.  Pt was regain consciousness quickly  and was aware of her surroundings. Pt denies chest pain or palpitation prior to fall. Pt denies dizziness or headache. Pt denies fecal or urinary incontinence. Pt denies bitting her tongue.  PT denies prior falls. Pt reports took her BP meds at 8 a. am lisinopril 20 , coreg 3.25 and HCTZ 12.5 . PT denies any recent change in BP meds. PT denies fever, chills, burning with urination, shortness of breath, diarrhea. Patient was diagnosed with syncope possibly multifactorial vasovagal vs medication use vs dehydration. CT head and pan scan was (-) for any fractures. Orthostatics were (-). She was also noted to be in a flutter for which cardiology was consulted. Patient is already rate controlled and recommended AC. [id number] was 4. Telemetry monitor showed bradycardia while on carvedilol and after discussing with cardiology, recommended to dc on low dose metoprolol. No indication to cardiovert as rate is controlled. Echo LVEF was 68%. Patients HCTZ was also held as this may precipitate dehydration and cough have contributed to this presentation.

## 2023-07-27 NOTE — DISCHARGE NOTE NURSING/CASE MANAGEMENT/SOCIAL WORK - NSDCVIVACCINE_GEN_ALL_CORE_FT
Tdap; 25-Jul-2023 15:04; Noman Calixto); [employer]; [id number] (Exp. Date: 18-Aug-2025); IntraMuscular; Deltoid Left.; 0.5 milliLiter(s); VIS (VIS Published: 09-May-2013, VIS Presented: 25-Jul-2023);

## 2023-07-27 NOTE — DISCHARGE NOTE PROVIDER - CARE PROVIDERS DIRECT ADDRESSES
,DirectAddress_Unknown,[email or web address]

## 2023-07-27 NOTE — DISCHARGE NOTE PROVIDER - CARE PROVIDER_API CALL
UZMA, SUNDEE  [address] [place] [address]  Phone: [phone]  Fax: [phone]  Follow Up Time:     Dannie Francis  Cardiovascular Disease  [address] [place] [id number]  Phone: [phone]  Fax: [phone]  Follow Up Time:

## 2023-07-27 NOTE — DISCHARGE NOTE PROVIDER - NSDCFUADDAPPT_GEN_ALL_CORE_FT
APPTS ARE READY TO BE MADE: [ ] YES    Best Family or Patient Contact (if needed):    Additional Information about above appointments (if needed):    1: Dr. Francis   2: Dr. Rosales  3:     Other comments or requests:

## 2023-07-27 NOTE — DISCHARGE NOTE PROVIDER - NSDCCPCAREPLAN_GEN_ALL_CORE_FT
Patient evaluated by doctor Liza Daniel . Ice pack applied to right orbital area and right wrist/lower arm area.      Sylvia Neville RN  10/31/17 6629 PRINCIPAL DISCHARGE DIAGNOSIS  Diagnosis: Syncope  Assessment and Plan of Treatment: You were diagnosed with syncope possibly multifactorial vasovagal vs medication use vs dehydration. Pan scan was (-) for any fractures. Orthostatics were (-). You were also noted to be in a flutter for which cardiology was consulted. You were already rate controlled did not require cardioversion. Cardiology did recommend anticoagulation. [id number] was 4. Telemetry monitor showed bradycardia while on carvedilol and after discussing with cardiology, it was recommended to discharge on low dose metoprolol instead. Echo LVEF was 68%. You should discontinue HCTZ as this may have caused you to have the syncopal episode.     PRINCIPAL DISCHARGE DIAGNOSIS  Diagnosis: Syncope  Assessment and Plan of Treatment: You were diagnosed with syncope possibly multifactorial vasovagal vs medication use vs dehydration. Pan scan was (-) for any fractures. Orthostatics were (-). You were also noted to be in a flutter for which cardiology was consulted. You were already rate controlled did not require cardioversion. Cardiology did recommend anticoagulation. [id number] was 4. Telemetry monitor showed bradycardia while on carvedilol and after discussing with cardiology, it was recommended to discharge on low dose metoprolol instead. Echo LVEF was 68%. You should discontinue HCTZ as this may have caused you to have the syncopal episode. Please follow up with cardiology to set up a 30 day MCOT.

## 2023-07-27 NOTE — DISCHARGE NOTE NURSING/CASE MANAGEMENT/SOCIAL WORK - NSDPDISTO_GEN_ALL_CORE
I called to talk to the pt, to find out more about her situation. The pt informed me that she was sponsored her by her . I asked to see if she has a green card. Pt stated that she has her green card. Pt stated that she currently has [place] [employer]. She stated that she pays $4 a week. Pt stated that she was put on [place] [employer], because at the time, she did not have her green card. I told the pt that I will help her to call the [facility] to switch her immigration status, so that she will be moved to [place]. I told the pt that she needs to come in Tuesday, so I can help her. I told her that I am still looking into diapers and other resources for her. I am waiting to hear back. [employer], does not distribute diapers, its distribute through their community partners. To do this, people will have to go on line to see what agency partners is close to the pt. I told her that I will let her know once, I find out.   
Home

## 2023-08-01 ENCOUNTER — EMERGENCY (EMERGENCY)
Facility: HOSPITAL | Age: 88
LOS: 0 days | Discharge: HOME | End: 2023-08-01
Attending: EMERGENCY MEDICINE
Payer: MEDICARE

## 2023-08-01 VITALS
TEMPERATURE: 98 F | HEIGHT: 62 IN | RESPIRATION RATE: 20 BRPM | DIASTOLIC BLOOD PRESSURE: 81 MMHG | HEART RATE: 62 BPM | SYSTOLIC BLOOD PRESSURE: 202 MMHG | OXYGEN SATURATION: 96 %

## 2023-08-01 DIAGNOSIS — R00.1 BRADYCARDIA, UNSPECIFIED: ICD-10-CM

## 2023-08-01 DIAGNOSIS — Z90.49 ACQUIRED ABSENCE OF OTHER SPECIFIED PARTS OF DIGESTIVE TRACT: Chronic | ICD-10-CM

## 2023-08-01 DIAGNOSIS — T44.7X5A ADVERSE EFFECT OF BETA-ADRENORECEPTOR ANTAGONISTS, INITIAL ENCOUNTER: ICD-10-CM

## 2023-08-01 DIAGNOSIS — I48.92 UNSPECIFIED ATRIAL FLUTTER: ICD-10-CM

## 2023-08-01 DIAGNOSIS — Z48.02 ENCOUNTER FOR REMOVAL OF SUTURES: ICD-10-CM

## 2023-08-01 DIAGNOSIS — S01.01XA LACERATION WITHOUT FOREIGN BODY OF SCALP, INITIAL ENCOUNTER: ICD-10-CM
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
## 2023-08-01 DIAGNOSIS — E86.0 DEHYDRATION: ICD-10-CM

## 2023-08-01 DIAGNOSIS — Z79.82 LONG TERM (CURRENT) USE OF ASPIRIN: ICD-10-CM

## 2023-08-01 DIAGNOSIS — I44.0 ATRIOVENTRICULAR BLOCK, FIRST DEGREE: ICD-10-CM

## 2023-08-01 DIAGNOSIS — R26.0 ATAXIC GAIT: ICD-10-CM

## 2023-08-01 DIAGNOSIS — I10 ESSENTIAL (PRIMARY) HYPERTENSION: ICD-10-CM

## 2023-08-01 DIAGNOSIS — G51.0 BELL'S PALSY: ICD-10-CM

## 2023-08-01 DIAGNOSIS — E66.9 OBESITY, UNSPECIFIED: ICD-10-CM

## 2023-08-01 DIAGNOSIS — T50.2X5A ADVERSE EFFECT OF CARBONIC-ANHYDRASE INHIBITORS, BENZOTHIADIAZIDES AND OTHER DIURETICS, INITIAL ENCOUNTER: ICD-10-CM
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
## 2023-08-01 DIAGNOSIS — E78.00 PURE HYPERCHOLESTEROLEMIA, UNSPECIFIED: ICD-10-CM

## 2023-08-01 DIAGNOSIS — S01.01XD LACERATION WITHOUT FOREIGN BODY OF SCALP, SUBSEQUENT ENCOUNTER: ICD-10-CM

## 2023-08-01 DIAGNOSIS — H91.90 UNSPECIFIED HEARING LOSS, UNSPECIFIED EAR: ICD-10-CM

## 2023-08-01 DIAGNOSIS — R55 SYNCOPE AND COLLAPSE: ICD-10-CM
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
## 2023-08-01 DIAGNOSIS — Z98.891 HISTORY OF UTERINE SCAR FROM PREVIOUS SURGERY: Chronic | ICD-10-CM

## 2023-08-01 DIAGNOSIS — M19.90 UNSPECIFIED OSTEOARTHRITIS, UNSPECIFIED SITE: ICD-10-CM

## 2023-08-01 PROCEDURE — 99212 OFFICE O/P EST SF 10 MIN: CPT

## 2023-08-01 PROCEDURE — L9995: CPT

## 2023-08-01 NOTE — ED PROVIDER NOTE - NSFOLLOWUPINSTRUCTIONS_ED_ALL_ED_FT
Follow up with your primary care doctor.       Wound Closure Removal, Care After  The following information offers guidance on how to care for yourself after your stitches (sutures), staples, or adhesive strips have been removed. Your health care provider may also give you more specific instructions. If you have problems or questions, contact your health care provider.    What can I expect after the procedure?  After your sutures or staples have been removed or your adhesive strips have fallen off, it is common to have:  Some discomfort and swelling in the area.  Slight redness in the area.  Follow these instructions at home:  If you have a dressing:    Wash your hands with soap and water for at least 20 seconds before and after you change your bandage (dressing). If soap and water are not available, use hand .  Change your dressing as told by your health care provider. If your dressing becomes wet or dirty, or develops a bad smell, change it as soon as possible.  If your dressing sticks to your skin, pour warm, clean water over it until it loosens and can be removed without pulling apart the wound edges. Pat the area dry with a soft, clean towel. Do not rub the wound because that may cause bleeding.  Wound care    Washing hands with soap and water.  Check your wound every day for signs of infection. Check for:  More redness, swelling, or pain.  Fluid or blood.  New warmth, a rash, or hardness at the wound site.  Pus or a bad smell.  Wash your hands with soap and water for at least 20 seconds before and after touching your wound. If soap and water are not available, use hand .  Keep the wound area dry and clean. Clean and pat the wound dry as told by your health care provider.  Apply cream or ointment only as told by your health care provider.  If skin glue or adhesive strips were applied after sutures or staples were removed, leave these closures in place until they peel off on their own. If adhesive strip edges start to loosen and curl up, you may trim the loose edges. Do not remove adhesive strips completely unless your health care provider tells you to do that.  Continue to protect the wound from injury.  Do not pick at your wound. Picking can cause an infection.  Bathing    Do not take baths, swim, or use a hot tub until your health care provider approves. Ask your health care provider if you may take showers.  Follow these steps for showering:  If you have a dressing, remove it before getting into the shower.  In the shower, allow soapy water to get on the wound. Avoid scrubbing the wound.  When you get out of the shower, dry the wound by patting it with a clean towel.  Reapply a dressing over the wound, if needed.  Scar care    When your wound has completely healed, help decrease the size of your scar by:  Wearing sunscreen over the scar or covering it with clothing when you are outside. New scars get sunburned easily, which can make scarring worse.  Gently massaging the scarred area. This can decrease scar thickness.  General instructions    Take over-the-counter and prescription medicines only as told by your health care provider.  Keep all follow-up visits. This is important.  Contact a health care provider if:  You have more redness, swelling, or pain around your wound.  You have fluid or blood coming from your wound.  You have new warmth, a rash, or hardness at the wound site.  You have pus or a bad smell coming from your wound.  Your wound opens up.  Get help right away if:  You have a fever or chills.  You have red streaks coming from your wound.  Summary  Change your dressing as told by your health care provider. If your dressing becomes wet or dirty, or develops a bad smell, change it as soon as possible.  Check your wound every day for signs of infection.  Wash your hands with soap and water for 20 seconds before and after touching your wound.  This information is not intended to replace advice given to you by your health care provider. Make sure you discuss any questions you have with your health care provider.

## 2023-08-01 NOTE — ED PROVIDER NOTE - PHYSICAL EXAMINATION
PHYSICAL EXAM: I have reviewed current vital signs.  GENERAL: NAD, well-nourished; well-developed.  HEAD:  Healing laceration to the occiput with 6 staples in place.  EYES: Conjunctiva and sclera clear.  ENT: MMM, no erythema/exudates.  NECK: Supple, no JVD.  CHEST/LUNG: Clear to auscultation bilaterally; no wheezes, rales, or rhonchi.  HEART: Regular rate and rhythm, normal S1 and S2; no murmurs, rubs, or gallops.  ABDOMEN: Soft, nontender, nondistended.  EXTREMITIES:  2+ peripheral pulses; no clubbing, cyanosis, or edema.  PSYCH: Cooperative, appropriate, normal mood and affect.  NEUROLOGY: A&O x 3. No focal neurological deficits.   SKIN: Warm and dry.

## 2023-08-01 NOTE — ED PROVIDER NOTE - OBJECTIVE STATEMENT
89-year-old female past medical history of HTN, HLD, arthritis, kyphosis, Bell's palsy, presents to the ED for staple removal.  Patient sustained a laceration to the back of her scalp after a syncope and fall 1 week ago.  Patient reports feeling well and has had no issues since being discharged from the hospital.  Patient has not had any recent fever, headache, dizziness, difficulty breathing, nausea, vomiting, or diarrhea.

## 2023-08-01 NOTE — ED PROVIDER NOTE - PATIENT PORTAL LINK FT
Children at bedside  Discussed about pts condition, right hip pain. Family waiting on Hospice . Want to think about Comfort care measures and will let us know if ok for comfort care measures. You can access the FollowMyHealth Patient Portal offered by [facility] by registering at the following website: [email or web address]. By joining [employer]’s FollowMyHealth portal, you will also be able to view your health information using other applications (apps) compatible with our system.

## 2023-08-01 NOTE — ED PROCEDURE NOTE - CPROC ED PHYSICIAN PRESENCE1
I was present during the key portion of the procedure. child is drinking, HR trending downwards, child is active, walking around ER/room. d/c home.

## 2023-08-01 NOTE — ED ADULT NURSE NOTE - NSFALLRISKINTERV_ED_ALL_ED

## 2023-08-08 ENCOUNTER — APPOINTMENT (OUTPATIENT)
Dept: CARDIOLOGY | Facility: CLINIC | Age: 88
End: 2023-08-08
Payer: MEDICARE

## 2023-08-08 VITALS
BODY MASS INDEX: 24.11 KG/M2 | SYSTOLIC BLOOD PRESSURE: 126 MMHG | DIASTOLIC BLOOD PRESSURE: 80 MMHG | WEIGHT: 131 LBS | HEIGHT: 62 IN

## 2023-08-08 PROCEDURE — 93000 ELECTROCARDIOGRAM COMPLETE: CPT

## 2023-08-08 PROCEDURE — 99214 OFFICE O/P EST MOD 30 MIN: CPT | Mod: 25

## 2023-08-08 RX ORDER — CARVEDILOL 25 MG/1
25 TABLET, FILM COATED ORAL
Refills: 0 | Status: DISCONTINUED | COMMUNITY
End: 2023-08-08

## 2023-08-08 RX ORDER — RIVAROXABAN 20 MG/1
20 TABLET, FILM COATED ORAL
Refills: 0 | Status: DISCONTINUED | COMMUNITY
End: 2023-08-08

## 2023-08-08 NOTE — ASSESSMENT
[FreeTextEntry1] : 89 yo hx [place] htn hld chronic bells palsy. She had syncope 7/23. Not sure if fell. Found in aflutter Now chest tender. No palpitations.. EKG reviewed. She has 24 hour help. She walks at times outside.ECHO 7/23 mild mr mild ai.Active in house. She cooks.. She goes up and down steps. Reviewed all meds. Also reviewed.old meds . Reviewed changes. Aware risk AC.

## 2023-08-08 NOTE — REVIEW OF SYSTEMS
[Fever] : no fever [Chills] : no chills [Blurry Vision] : no blurred vision [Hearing Loss] : hearing loss [SOB] : no shortness of breath [Dyspnea on exertion] : dyspnea during exertion [Wheezing] : no wheezing [Abdominal Pain] : no abdominal pain [Dysuria] : no dysuria [Joint Pain] : joint pain [Hand Pain] : hand pain [Rash] : no rash [Dizziness] : no dizziness [Confusion] : no confusion was observed [Easy Bleeding] : no tendency for easy bleeding

## 2023-08-08 NOTE — HISTORY OF PRESENT ILLNESS
[FreeTextEntry1] : 89 yo hx [place] htn hld chronic bells palsy. She had syncope 7/23. Not sure if fell. Found in aflutter Now chest tender. No palpitations.

## 2023-08-10 ENCOUNTER — APPOINTMENT (OUTPATIENT)
Dept: CARDIOLOGY | Facility: CLINIC | Age: 88
End: 2023-08-10

## 2023-08-14 ENCOUNTER — INPATIENT (INPATIENT)
Facility: HOSPITAL | Age: 88
LOS: 1 days | Discharge: HOME | DRG: 68 | End: 2023-08-16
Attending: INTERNAL MEDICINE | Admitting: FAMILY MEDICINE
Payer: MEDICARE

## 2023-08-14 VITALS
HEIGHT: 62 IN | SYSTOLIC BLOOD PRESSURE: 185 MMHG | OXYGEN SATURATION: 96 % | TEMPERATURE: 98 F | WEIGHT: 130.95 LBS | RESPIRATION RATE: 18 BRPM | HEART RATE: 67 BPM | DIASTOLIC BLOOD PRESSURE: 77 MMHG

## 2023-08-14 DIAGNOSIS — Z90.49 ACQUIRED ABSENCE OF OTHER SPECIFIED PARTS OF DIGESTIVE TRACT: Chronic | ICD-10-CM

## 2023-08-14 DIAGNOSIS — R42 DIZZINESS AND GIDDINESS: ICD-10-CM

## 2023-08-14 DIAGNOSIS — Z98.891 HISTORY OF UTERINE SCAR FROM PREVIOUS SURGERY: Chronic | ICD-10-CM

## 2023-08-14 LAB
ALBUMIN SERPL ELPH-MCNC: 4.1 G/DL — SIGNIFICANT CHANGE UP (ref 3.5–5.2)
ALP SERPL-CCNC: 92 U/L — SIGNIFICANT CHANGE UP (ref 30–115)
ALT FLD-CCNC: 20 U/L — SIGNIFICANT CHANGE UP (ref 0–41)
ANION GAP SERPL CALC-SCNC: 9 MMOL/L — SIGNIFICANT CHANGE UP (ref 7–14)
APPEARANCE UR: CLEAR — SIGNIFICANT CHANGE UP
APTT BLD: 45 SEC — HIGH (ref 27–39.2)
AST SERPL-CCNC: 21 U/L — SIGNIFICANT CHANGE UP (ref 0–41)
BACTERIA # UR AUTO: ABNORMAL /HPF
BASOPHILS # BLD AUTO: 0.03 K/UL — SIGNIFICANT CHANGE UP (ref 0–0.2)
BASOPHILS NFR BLD AUTO: 0.4 % — SIGNIFICANT CHANGE UP (ref 0–1)
BILIRUB SERPL-MCNC: 0.3 MG/DL — SIGNIFICANT CHANGE UP (ref 0.2–1.2)
BILIRUB UR-MCNC: NEGATIVE — SIGNIFICANT CHANGE UP
BUN SERPL-MCNC: 13 MG/DL — SIGNIFICANT CHANGE UP (ref 10–20)
CALCIUM SERPL-MCNC: 9.9 MG/DL — SIGNIFICANT CHANGE UP (ref 8.4–10.5)
CHLORIDE SERPL-SCNC: 105 MMOL/L — SIGNIFICANT CHANGE UP (ref 98–110)
CO2 SERPL-SCNC: 25 MMOL/L — SIGNIFICANT CHANGE UP (ref 17–32)
COLOR SPEC: YELLOW — SIGNIFICANT CHANGE UP
CREAT SERPL-MCNC: 0.7 MG/DL — SIGNIFICANT CHANGE UP (ref 0.7–1.5)
DIFF PNL FLD: ABNORMAL
EGFR: 80 ML/MIN/1.73M2 — SIGNIFICANT CHANGE UP
EOSINOPHIL # BLD AUTO: 0.2 K/UL — SIGNIFICANT CHANGE UP (ref 0–0.7)
EOSINOPHIL NFR BLD AUTO: 2.7 % — SIGNIFICANT CHANGE UP (ref 0–8)
EPI CELLS # UR: ABNORMAL /HPF (ref 0–5)
FLUAV AG NPH QL: SIGNIFICANT CHANGE UP
FLUBV AG NPH QL: SIGNIFICANT CHANGE UP
GLUCOSE SERPL-MCNC: 185 MG/DL — HIGH (ref 70–99)
GLUCOSE UR QL: NEGATIVE MG/DL — SIGNIFICANT CHANGE UP
HCT VFR BLD CALC: 41.4 % — SIGNIFICANT CHANGE UP (ref 37–47)
HGB BLD-MCNC: 13.2 G/DL — SIGNIFICANT CHANGE UP (ref 12–16)
IMM GRANULOCYTES NFR BLD AUTO: 0.4 % — HIGH (ref 0.1–0.3)
INR BLD: 1.71 RATIO — HIGH (ref 0.65–1.3)
KETONES UR-MCNC: NEGATIVE — SIGNIFICANT CHANGE UP
LACTATE SERPL-SCNC: 1.3 MMOL/L — SIGNIFICANT CHANGE UP (ref 0.7–2)
LEUKOCYTE ESTERASE UR-ACNC: ABNORMAL
LIDOCAIN IGE QN: 23 U/L — SIGNIFICANT CHANGE UP (ref 7–60)
LYMPHOCYTES # BLD AUTO: 1.98 K/UL — SIGNIFICANT CHANGE UP (ref 1.2–3.4)
LYMPHOCYTES # BLD AUTO: 26.7 % — SIGNIFICANT CHANGE UP (ref 20.5–51.1)
MAGNESIUM SERPL-MCNC: 2.2 MG/DL — SIGNIFICANT CHANGE UP (ref 1.8–2.4)
MCHC RBC-ENTMCNC: 28.4 PG — SIGNIFICANT CHANGE UP (ref 27–31)
MCHC RBC-ENTMCNC: 31.9 G/DL — LOW (ref 32–37)
MCV RBC AUTO: 89.2 FL — SIGNIFICANT CHANGE UP (ref 81–99)
MONOCYTES # BLD AUTO: 0.6 K/UL — SIGNIFICANT CHANGE UP (ref 0.1–0.6)
MONOCYTES NFR BLD AUTO: 8.1 % — SIGNIFICANT CHANGE UP (ref 1.7–9.3)
NEUTROPHILS # BLD AUTO: 4.57 K/UL — SIGNIFICANT CHANGE UP (ref 1.4–6.5)
NEUTROPHILS NFR BLD AUTO: 61.7 % — SIGNIFICANT CHANGE UP (ref 42.2–75.2)
NITRITE UR-MCNC: NEGATIVE — SIGNIFICANT CHANGE UP
NRBC # BLD: 0 /100 WBCS — SIGNIFICANT CHANGE UP (ref 0–0)
PH UR: 5.5 — SIGNIFICANT CHANGE UP (ref 5–8)
PLATELET # BLD AUTO: 239 K/UL — SIGNIFICANT CHANGE UP (ref 130–400)
PMV BLD: 10 FL — SIGNIFICANT CHANGE UP (ref 7.4–10.4)
POTASSIUM SERPL-MCNC: 3.4 MMOL/L — LOW (ref 3.5–5)
POTASSIUM SERPL-SCNC: 3.4 MMOL/L — LOW (ref 3.5–5)
PROT SERPL-MCNC: 7.2 G/DL — SIGNIFICANT CHANGE UP (ref 6–8)
PROT UR-MCNC: NEGATIVE MG/DL — SIGNIFICANT CHANGE UP
PROTHROM AB SERPL-ACNC: 19.8 SEC — HIGH (ref 9.95–12.87)
RBC # BLD: 4.64 M/UL — SIGNIFICANT CHANGE UP (ref 4.2–5.4)
RBC # FLD: 14.9 % — HIGH (ref 11.5–14.5)
RBC CASTS # UR COMP ASSIST: ABNORMAL /HPF (ref 0–4)
RSV RNA NPH QL NAA+NON-PROBE: SIGNIFICANT CHANGE UP
SARS-COV-2 RNA SPEC QL NAA+PROBE: SIGNIFICANT CHANGE UP
SODIUM SERPL-SCNC: 139 MMOL/L — SIGNIFICANT CHANGE UP (ref 135–146)
SP GR SPEC: <=1.005 — SIGNIFICANT CHANGE UP (ref 1.01–1.03)
TROPONIN T SERPL-MCNC: 0.01 NG/ML — SIGNIFICANT CHANGE UP
UROBILINOGEN FLD QL: 0.2 MG/DL — SIGNIFICANT CHANGE UP
WBC # BLD: 7.41 K/UL — SIGNIFICANT CHANGE UP (ref 4.8–10.8)
WBC # FLD AUTO: 7.41 K/UL — SIGNIFICANT CHANGE UP (ref 4.8–10.8)
WBC UR QL: ABNORMAL /HPF (ref 0–5)

## 2023-08-14 PROCEDURE — 70498 CT ANGIOGRAPHY NECK: CPT | Mod: 26,MA

## 2023-08-14 PROCEDURE — 84100 ASSAY OF PHOSPHORUS: CPT

## 2023-08-14 PROCEDURE — 97162 PT EVAL MOD COMPLEX 30 MIN: CPT | Mod: GP

## 2023-08-14 PROCEDURE — 93010 ELECTROCARDIOGRAM REPORT: CPT

## 2023-08-14 PROCEDURE — 70450 CT HEAD/BRAIN W/O DYE: CPT | Mod: 26,MA,59

## 2023-08-14 PROCEDURE — 85025 COMPLETE CBC W/AUTO DIFF WBC: CPT

## 2023-08-14 PROCEDURE — 70496 CT ANGIOGRAPHY HEAD: CPT | Mod: 26,MA

## 2023-08-14 PROCEDURE — 80048 BASIC METABOLIC PNL TOTAL CA: CPT

## 2023-08-14 PROCEDURE — 93005 ELECTROCARDIOGRAM TRACING: CPT

## 2023-08-14 PROCEDURE — 80053 COMPREHEN METABOLIC PANEL: CPT

## 2023-08-14 PROCEDURE — 97165 OT EVAL LOW COMPLEX 30 MIN: CPT | Mod: GO

## 2023-08-14 PROCEDURE — 83036 HEMOGLOBIN GLYCOSYLATED A1C: CPT

## 2023-08-14 PROCEDURE — 36415 COLL VENOUS BLD VENIPUNCTURE: CPT

## 2023-08-14 PROCEDURE — 85027 COMPLETE CBC AUTOMATED: CPT

## 2023-08-14 PROCEDURE — 83735 ASSAY OF MAGNESIUM: CPT

## 2023-08-14 PROCEDURE — 99285 EMERGENCY DEPT VISIT HI MDM: CPT

## 2023-08-14 PROCEDURE — 71045 X-RAY EXAM CHEST 1 VIEW: CPT | Mod: 26

## 2023-08-14 PROCEDURE — 93306 TTE W/DOPPLER COMPLETE: CPT

## 2023-08-14 RX ORDER — SODIUM CHLORIDE 9 MG/ML
1000 INJECTION INTRAMUSCULAR; INTRAVENOUS; SUBCUTANEOUS
Refills: 0 | Status: DISCONTINUED | OUTPATIENT
Start: 2023-08-14 | End: 2023-08-15

## 2023-08-14 RX ORDER — SIMVASTATIN 20 MG/1
40 TABLET, FILM COATED ORAL AT BEDTIME
Refills: 0 | Status: DISCONTINUED | OUTPATIENT
Start: 2023-08-14 | End: 2023-08-16

## 2023-08-14 RX ORDER — METOPROLOL TARTRATE 50 MG
25 TABLET ORAL EVERY 12 HOURS
Refills: 0 | Status: DISCONTINUED | OUTPATIENT
Start: 2023-08-14 | End: 2023-08-15

## 2023-08-14 RX ORDER — APIXABAN 2.5 MG/1
2.5 TABLET, FILM COATED ORAL EVERY 12 HOURS
Refills: 0 | Status: DISCONTINUED | OUTPATIENT
Start: 2023-08-15 | End: 2023-08-16

## 2023-08-14 RX ORDER — PANTOPRAZOLE SODIUM 20 MG/1
40 TABLET, DELAYED RELEASE ORAL
Refills: 0 | Status: DISCONTINUED | OUTPATIENT
Start: 2023-08-14 | End: 2023-08-16

## 2023-08-14 RX ORDER — LISINOPRIL 2.5 MG/1
20 TABLET ORAL DAILY
Refills: 0 | Status: DISCONTINUED | OUTPATIENT
Start: 2023-08-15 | End: 2023-08-16

## 2023-08-14 RX ORDER — ACETAMINOPHEN 500 MG
650 TABLET ORAL EVERY 6 HOURS
Refills: 0 | Status: DISCONTINUED | OUTPATIENT
Start: 2023-08-14 | End: 2023-08-16

## 2023-08-14 RX ORDER — CEFTRIAXONE 500 MG/1
1000 INJECTION, POWDER, FOR SOLUTION INTRAMUSCULAR; INTRAVENOUS ONCE
Refills: 0 | Status: COMPLETED | OUTPATIENT
Start: 2023-08-14 | End: 2023-08-14

## 2023-08-14 RX ORDER — CHLORHEXIDINE GLUCONATE 213 G/1000ML
1 SOLUTION TOPICAL
Refills: 0 | Status: DISCONTINUED | OUTPATIENT
Start: 2023-08-14 | End: 2023-08-16

## 2023-08-14 RX ORDER — ASPIRIN/CALCIUM CARB/MAGNESIUM 324 MG
81 TABLET ORAL DAILY
Refills: 0 | Status: DISCONTINUED | OUTPATIENT
Start: 2023-08-15 | End: 2023-08-16

## 2023-08-14 RX ADMIN — CEFTRIAXONE 100 MILLIGRAM(S): 500 INJECTION, POWDER, FOR SOLUTION INTRAMUSCULAR; INTRAVENOUS at 23:53

## 2023-08-14 NOTE — ED ADULT NURSE NOTE - NSICDXPASTSURGICALHX_GEN_ALL_CORE_FT
PAST SURGICAL HISTORY:  History of      S/P cholecystectomy      Consent was obtained from the patient. The risks, benefits and alternatives to therapy were discussed in detail. Specifically, the risks of infection, scarring, bleeding, prolonged wound healing, nerve injury, incomplete removal, allergy to anesthesia and recurrence were addressed. Alternatives to ED&C, such as: surgical removal and XRT were also discussed.  Prior to the procedure, the treatment site was clearly identified and confirmed by the patient. All components of Universal Protocol/PAUSE Rule completed.

## 2023-08-14 NOTE — ED ADULT NURSE NOTE - BEFAST EYES
Chief Complaint   Patient presents with   • Medication Management     follow up/medication/ use to see a psychotherapist and things hasn't been going well and patient want to see a psy doctor and the issues is playing with her appetite        HPI:  Kimberly Duran is a 29 year old female who presents here for follow-up .     Patient is history of depression and anxiety. Onset of symptoms was approximately several year(s) ago. She denies current suicidal and homicidal ideation.   Symptoms include depressed mood, anhedonia, fatigue, difficulty concentrating, anxiety and panic.  Family history significant for anxiety and depression.   Patient was started on Lexapro 10 MG and was also given Xanax   Patient does have a history of social anxiety. Patient works in As a  in [employer]. Patient is a tech sergeant.  .   Patient does not have any siblings however does talk to one partner. Patient was started on Effexor and patient states that she noticed anxiety is better.    Patient however states that at times patient gets increased anxiety. Patient worries about her and does not think good things about her. Patient denies any suicidal or homicidal ideations. Patient states that this week is okay. Patient states that sometimes it affects with her appetite. Patient gets nausea and wants to eat. Patient states that she goes to eat however she gets nauseous after. Denies any vomiting, constipation diarrhea or abdominal cramps.       Patient here for follow up on ADD. Patient was complaining of having difficulty concentration, cant keep track, forget things, gets side tracked., cant focus on doing papers, her mother had to sit down with her to do homework when she was young and she was tested for ADHD.   Patient was evaluated by behavioral health specialist and showed signs of ADD. Patient was started on vyvanse and is doing well. Patient is able to keep track of things a work, finish jobs and improved  concentration                                    PROBLEM LIST:    Patient Active Problem List   Diagnosis   • Social anxiety disorder   • Anxiety and depression   • Attention deficit disorder (ADD) without hyperactivity       MEDICATIONS:    Current Outpatient Prescriptions   Medication Sig Dispense Refill   • escitalopram (LEXAPRO) 10 MG tablet Take 0.5 tablets by mouth daily. 30 tablet 5   • venlafaxine XR (EFFEXOR XR) 75 MG 24 hr capsule Take 1 capsule by mouth daily. After one week, take 2 tabs daily 60 capsule 0   • lisdexamfetamine (VYVANSE) 30 MG capsule Take 1 capsule by mouth every morning. 30 capsule 0   • meloxicam (MOBIC) 15 MG tablet Take 1 tablet by mouth daily as needed for Pain. Take with food, stop if causes stomach upset 30 tablet 0   • ALPRAZolam (XANAX) 0.25 MG tablet Take 1/2 to 1 tablet once a day as needed for anxiety 20 tablet 0   • zolpidem (AMBIEN) 5 MG tablet Take 1 tablet by mouth nightly as needed for Sleep. 30 tablet 2   • norethindrone-ethinyl estradiol-iron (MICROGESTIN FE 1.5/30) 1.5-30 MG-MCG tablet Take 1 tablet by mouth daily. 30 tablet 11     No current facility-administered medications for this visit.        ALLERGIES:    ALLERGIES:  No Known Allergies    PAST MEDICAL HISTORY:    Past Medical History:   Diagnosis Date   • Anxiety    • Depressive disorder        SURGICAL HISTORY:    Past Surgical History:   Procedure Laterality Date   • Shoulder surgery         FAMILY HISTORY:    Family History   Problem Relation Age of Onset   • Thyroid Mother    • Anxiety disorder Father        SOCIAL HISTORY:       Social History     Social History   • Marital status: Single     Spouse name: N/A   • Number of children: N/A   • Years of education: N/A     Social History Main Topics   • Smoking status: Never Smoker   • Smokeless tobacco: Never Used   • Alcohol use 0.0 oz/week      Comment: rare   • Drug use: No   • Sexual activity: Not Asked     Other Topics Concern   • None     Social History  Narrative   • None       REVIEW OF SYSTEMS:    Constitutional: Negative for fever and chills.   Respiratory: Negative cough, wheezing or shortness of breath.    Cardiovascular: Negative for chest pain, chest pressure, palpitations or diaphoresis.   Gastrointestinal: As in history of present illness. .  Neuro:  As in HPI  Psych: As in HPI     PHYSICAL EXAM:    Vital Signs:    Visit Vitals  /78 ([place] [facility] [place] Sitting, Cuff Size: Regular)   Pulse 86   Temp 99 °F (37.2 °C) (Oral)   Ht 5' 3\" (1.6 m)   Wt 60.8 kg Comment: 134 lb   SpO2 98%   BMI 23.74 kg/m²     General:   Alert, cooperative, conversive in no acute distress. . In good spirits  HEENT: Normocephalic/atraumatic, mucous membrane moist  Neck: Supple no lymphadenopathy    Cardiovascular:   RRR   Respiratory:   Normal respiratory effort.  CTA.  No wheezes, rales or rhonchi.  Gastrointestinal:  Soft and    Nondistended, mild tenderness in the epigastrium  Musculoskeletal:  No deformity or edema.   Skin: + plantar wart on left foot  Neuro:  Orientated x 3.  No focal deficits.    Psychiatric:  Cooperative.  Appropriate mood & affect. Dress, personal hygiene appropriate  Affect: depressed   There is not psychomotor agitation present.  There is not psychomotor depression present.  good eye contact.  Mood appropriate to the situation.      ASSESSMENT:    1. Anxiety and depression    2. Social anxiety disorder    3. Nausea        PLAN:      1. Anxiety/depression: Symptoms worsening. Patient states that the Effexor has worked for her. At this time we'll wean off Lexapro over the next 1 week and increase the Effexor to 150 mg after one week. Pressure put in for patient to see a psychiatrist. Follow up with me in 5 weeks.  Patient denies any SI/HI. she agrees to call or be seen immediately for any suicidal/homicidal  ideation or other concerning side effects.   2.  ADD: improved on Vyvanse.  Discussed side effect with the patient patient  verbalized understanding..   3. Nausea: Patient had workup in the past including EGD which were unremarkable. We'll monitor closely.       Treatment options discussed with patient and explained in detail. The risks, benefits and potential side effects of possible medications were reviewed. Alternatives were discussed. Medication instructions and consequences of not taking the medications were discussed. Patient's understanding was assessed and patient agreed with the plan. Monitoring parameters and expected course outlined. Patient to call or come in if symptoms fail to respond as outlined, or worsen in any way.         Orders Placed This Encounter   • SERVICE TO BEHAVIORAL HEALTH   • escitalopram (LEXAPRO) 10 MG tablet   • venlafaxine XR (EFFEXOR XR) 75 MG 24 hr capsule      No

## 2023-08-14 NOTE — ED ADULT NURSE NOTE - NSFALLHARMRISKINTERV_ED_ALL_ED
Assistance OOB with selected safe patient handling equipment if applicable/Assistance with ambulation/Communicate risk of Fall with Harm to all staff, patient, and family/Encourage patient to sit up slowly, dangle for a short time, stand at bedside before walking/Monitor gait and stability/Orthostatic vital signs/Provide visual cue: red socks, yellow wristband, yellow gown, etc/Reinforce activity limits and safety measures with patient and family/Bed in lowest position, wheels locked, appropriate side rails in place/Call bell, personal items and telephone in reach/Instruct patient to call for assistance before getting out of bed/chair/stretcher/Non-slip footwear applied when patient is off stretcher/[place] to call system/Physically safe environment - no spills, clutter or unnecessary equipment/Purposeful Proactive Rounding/Room/bathroom lighting operational, light cord in reach

## 2023-08-14 NOTE — ED PROVIDER NOTE - PHYSICAL EXAMINATION
Physical Exam    Vital Signs: I have reviewed the initial vital signs.  Constitutional: well-nourished, appears stated age, no acute distress  Eyes: Conjunctiva pink, Sclera clear, PERRLA, EOMI.  Cardiovascular: S1 and S2, regular rate, regular rhythm, well-perfused extremities, radial pulses equal and 2+  Respiratory: unlabored respiratory effort, clear to auscultation bilaterally no wheezing, rales and rhonchi  Gastrointestinal: soft, non-tender abdomen, no pulsatile mass, normal bowl sounds  Musculoskeletal: supple neck, no lower extremity edema, no midline tenderness  Integumentary: warm, dry, no rash  Neurologic: awake, alert, cranial nerves II-XII grossly intact except chronic right sided droop, extremities’ motor and sensory functions grossly intact  Psychiatric: appropriate mood, appropriate affect

## 2023-08-14 NOTE — ED PROVIDER NOTE - CARE PLAN
1 Principal Discharge DX:	Dizziness   Principal Discharge DX:	Dizziness  Secondary Diagnosis:	Carotid stenosis

## 2023-08-14 NOTE — ED PROVIDER NOTE - OBJECTIVE STATEMENT
89yF HTN HLD, a-flutter on metoprolol and Eliquis Chronic right Bell's palsy  comes to emergency room emergency room for 2 episodes of dizziness. First episode was this morning while in bed felt dizziness for few minutes resolved all day until tonight around 630 turned her head and felt dizzy last for few minutes and resolved.  Patient currently asymptomatic denies diplopia dysphagia paresthesias numbness weakness of extremities.

## 2023-08-14 NOTE — ED PROVIDER NOTE - CLINICAL SUMMARY MEDICAL DECISION MAKING FREE TEXT BOX
89yF HTN HLD<  aflutter on metoprolol and Eliquis Chronic right Bell's palsy  pw 2 episodes of dizziness. First episode was this morning while in bed felt dizziness for few minutes resolved all day until tonight around 630 turned her head and felt dizzy last for few minutes and resolved.  Patient currently asymptomatic denies diplopia dysphagia paresthesias numbness weakness of extremities. Nonfocal exam no dysmetria normal finger-to-nose , right Bell's palsy cvs rrr   Labs reviewed positive UTI antibiotics given CT CTA head and neck Calcification at the left common carotid artery bifurcation, with moderate-to-severe stenosis at the left the carotid bulb.    2. No evidence of major vascular occlusion or aneurysm.    Dw neuro  q4h neuro checks   keep  BP  >180.    Pt admitted to tele

## 2023-08-14 NOTE — ED ADULT TRIAGE NOTE - CHIEF COMPLAINT QUOTE
As per patient's daughter, patient had episode of feeling off balance at 6:30pm when she turned her head.

## 2023-08-15 LAB
A1C WITH ESTIMATED AVERAGE GLUCOSE RESULT: 6.2 % — HIGH (ref 4–5.6)
ANION GAP SERPL CALC-SCNC: 12 MMOL/L — SIGNIFICANT CHANGE UP (ref 7–14)
BUN SERPL-MCNC: 11 MG/DL — SIGNIFICANT CHANGE UP (ref 10–20)
CALCIUM SERPL-MCNC: 9.5 MG/DL — SIGNIFICANT CHANGE UP (ref 8.4–10.5)
CHLORIDE SERPL-SCNC: 107 MMOL/L — SIGNIFICANT CHANGE UP (ref 98–110)
CO2 SERPL-SCNC: 22 MMOL/L — SIGNIFICANT CHANGE UP (ref 17–32)
CREAT SERPL-MCNC: 0.6 MG/DL — LOW (ref 0.7–1.5)
EGFR: 83 ML/MIN/1.73M2 — SIGNIFICANT CHANGE UP
ESTIMATED AVERAGE GLUCOSE: 131 MG/DL — HIGH (ref 68–114)
GLUCOSE SERPL-MCNC: 139 MG/DL — HIGH (ref 70–99)
HCT VFR BLD CALC: 38.3 % — SIGNIFICANT CHANGE UP (ref 37–47)
HGB BLD-MCNC: 12.5 G/DL — SIGNIFICANT CHANGE UP (ref 12–16)
MAGNESIUM SERPL-MCNC: 2 MG/DL — SIGNIFICANT CHANGE UP (ref 1.8–2.4)
MCHC RBC-ENTMCNC: 28.7 PG — SIGNIFICANT CHANGE UP (ref 27–31)
MCHC RBC-ENTMCNC: 32.6 G/DL — SIGNIFICANT CHANGE UP (ref 32–37)
MCV RBC AUTO: 88 FL — SIGNIFICANT CHANGE UP (ref 81–99)
NRBC # BLD: 0 /100 WBCS — SIGNIFICANT CHANGE UP (ref 0–0)
PHOSPHATE SERPL-MCNC: 3.3 MG/DL — SIGNIFICANT CHANGE UP (ref 2.1–4.9)
PLATELET # BLD AUTO: 231 K/UL — SIGNIFICANT CHANGE UP (ref 130–400)
PMV BLD: 10.5 FL — HIGH (ref 7.4–10.4)
POTASSIUM SERPL-MCNC: 4.1 MMOL/L — SIGNIFICANT CHANGE UP (ref 3.5–5)
POTASSIUM SERPL-SCNC: 4.1 MMOL/L — SIGNIFICANT CHANGE UP (ref 3.5–5)
RBC # BLD: 4.35 M/UL — SIGNIFICANT CHANGE UP (ref 4.2–5.4)
RBC # FLD: 14.9 % — HIGH (ref 11.5–14.5)
SODIUM SERPL-SCNC: 141 MMOL/L — SIGNIFICANT CHANGE UP (ref 135–146)
WBC # BLD: 7.27 K/UL — SIGNIFICANT CHANGE UP (ref 4.8–10.8)
WBC # FLD AUTO: 7.27 K/UL — SIGNIFICANT CHANGE UP (ref 4.8–10.8)

## 2023-08-15 PROCEDURE — 99291 CRITICAL CARE FIRST HOUR: CPT

## 2023-08-15 PROCEDURE — 93306 TTE W/DOPPLER COMPLETE: CPT | Mod: 26

## 2023-08-15 PROCEDURE — 99232 SBSQ HOSP IP/OBS MODERATE 35: CPT

## 2023-08-15 PROCEDURE — 99222 1ST HOSP IP/OBS MODERATE 55: CPT

## 2023-08-15 PROCEDURE — 93010 ELECTROCARDIOGRAM REPORT: CPT

## 2023-08-15 RX ORDER — METOPROLOL TARTRATE 50 MG
12.5 TABLET ORAL
Refills: 0 | Status: DISCONTINUED | OUTPATIENT
Start: 2023-08-15 | End: 2023-08-16

## 2023-08-15 RX ORDER — POTASSIUM CHLORIDE 20 MEQ
40 PACKET (EA) ORAL ONCE
Refills: 0 | Status: COMPLETED | OUTPATIENT
Start: 2023-08-15 | End: 2023-08-15

## 2023-08-15 RX ORDER — HYDRALAZINE HCL 50 MG
10 TABLET ORAL ONCE
Refills: 0 | Status: COMPLETED | OUTPATIENT
Start: 2023-08-15 | End: 2023-08-15

## 2023-08-15 RX ADMIN — Medication 81 MILLIGRAM(S): at 11:27

## 2023-08-15 RX ADMIN — Medication 650 MILLIGRAM(S): at 11:30

## 2023-08-15 RX ADMIN — APIXABAN 2.5 MILLIGRAM(S): 2.5 TABLET, FILM COATED ORAL at 18:32

## 2023-08-15 RX ADMIN — Medication 40 MILLIEQUIVALENT(S): at 01:22

## 2023-08-15 RX ADMIN — CHLORHEXIDINE GLUCONATE 1 APPLICATION(S): 213 SOLUTION TOPICAL at 05:16

## 2023-08-15 RX ADMIN — SIMVASTATIN 40 MILLIGRAM(S): 20 TABLET, FILM COATED ORAL at 21:09

## 2023-08-15 RX ADMIN — Medication 650 MILLIGRAM(S): at 01:23

## 2023-08-15 RX ADMIN — LISINOPRIL 20 MILLIGRAM(S): 2.5 TABLET ORAL at 05:13

## 2023-08-15 RX ADMIN — Medication 650 MILLIGRAM(S): at 21:22

## 2023-08-15 RX ADMIN — APIXABAN 2.5 MILLIGRAM(S): 2.5 TABLET, FILM COATED ORAL at 05:12

## 2023-08-15 RX ADMIN — PANTOPRAZOLE SODIUM 40 MILLIGRAM(S): 20 TABLET, DELAYED RELEASE ORAL at 05:12

## 2023-08-15 RX ADMIN — Medication 650 MILLIGRAM(S): at 21:09

## 2023-08-15 RX ADMIN — Medication 650 MILLIGRAM(S): at 01:53

## 2023-08-15 RX ADMIN — SODIUM CHLORIDE 75 MILLILITER(S): 9 INJECTION INTRAMUSCULAR; INTRAVENOUS; SUBCUTANEOUS at 01:25

## 2023-08-15 RX ADMIN — Medication 10 MILLIGRAM(S): at 01:24

## 2023-08-15 NOTE — PATIENT PROFILE ADULT - FALL HARM RISK - HARM RISK INTERVENTIONS

## 2023-08-15 NOTE — OCCUPATIONAL THERAPY INITIAL EVALUATION ADULT - LIVES WITH, PROFILE
daughter in a private home +6 steps to enter with bilateral handrails +1 flight of steps inside with left handrail +walk-in-shower with grab bars +standard toilet with vanity within reach/children

## 2023-08-15 NOTE — H&P ADULT - CRITICAL CARE ATTENDING COMMENT
Pt seen w PA and agree w above.  Pt had some dizziness today and repeat episode of same.  CT findings negative for acute cva, + L carotid stenosis.  At this time neuro f/u. Monitor in SDU Q4hr neuro checks, Consider Vascular eval for carotid stenosis.  Continue eliquis for a-fib.

## 2023-08-15 NOTE — OCCUPATIONAL THERAPY INITIAL EVALUATION ADULT - GENERAL OBSERVATIONS, REHAB EVAL
9:45-10:30; chart reviewed, ok to treat by Occupational Therapist as confirmed by RN Chris, Pt received semifowler +tele +primafit +pulse ox +IV with daughter present in NAD. Pt denied pain at rest and in agreement with OT IE.

## 2023-08-15 NOTE — H&P ADULT - NSHPLABSRESULTS_GEN_ALL_CORE
13.2   7.41  )-----------( 239      ( 14 Aug 2023 19:35 )             41.4       08-14    139  |  105  |  13  ----------------------------<  185<H>  3.4<L>   |  25  |  0.7    Ca    9.9      14 Aug 2023 19:35  Mg     2.2     08-14    TPro  7.2  /  Alb  4.1  /  TBili  0.3  /  DBili  x   /  AST  21  /  ALT  20  /  AlkPhos  92  08-14              Urinalysis Basic - ( 14 Aug 2023 19:35 )    Color: Yellow / Appearance: Clear / SG: <=1.005 / pH: x  Gluc: 185 mg/dL / Ketone: Negative  / Bili: Negative / Urobili: 0.2 mg/dL   Blood: x / Protein: Negative mg/dL / Nitrite: Negative   Leuk Esterase: Moderate / RBC: 6-10 /HPF / WBC 26-50 /HPF   Sq Epi: x / Non Sq Epi: x / Bacteria: Few /HPF        PT/INR - ( 14 Aug 2023 19:35 )   PT: 19.80 sec;   INR: 1.71 ratio         PTT - ( 14 Aug 2023 19:35 )  PTT:45.0 sec    Lactate Trend  08-14 @ 19:35 Lactate:1.3       CARDIAC MARKERS ( 14 Aug 2023 19:35 )  x     / 0.01 ng/mL / x     / x     / x            CAPILLARY BLOOD GLUCOSE      < from: CT Angio Head w/ IV Cont (08.14.23 @ 20:52) >        IMPRESSION:    1. Calcification at the left common carotid artery bifurcation, with   moderate-to-severe stenosis at the left the carotid bulb.    2. No evidence of major vascular occlusion or aneurysm.      < end of copied text >    < from: CT Head No Cont (08.14.23 @ 20:51) >      IMPRESSION:    No acute intracranial pathology.      < end of copied text >

## 2023-08-15 NOTE — PHYSICAL THERAPY INITIAL EVALUATION ADULT - GENERAL OBSERVATIONS, REHAB EVAL
15;30-15;55 pt was seen for PT IE at bed side, pt is agreeable, chart thoroughly reviewed, RN Chris is aware.  Pt was received semi cespedes in bed, in no apparent distress, +telemonitoring, +BP cuff and +pulse ox, +hep lock, +primafit, +call bell within reach, bed side table at reach, daughter is at bed side

## 2023-08-15 NOTE — H&P ADULT - HISTORY OF PRESENT ILLNESS
89 yo W female w/ PMH as noted below.  Pt comes to hospital c/o dizziness this evening. Pt denied any other prodromes ( HA, fever, chills, N/V) .  pt has hx bells palsy and has chronic rt facial droop.  NIH score -0 in ER  additionally pt w/ elevated  systolic. .  W/U in ER  imaging shows severe left carotid bulb stenosis. CT head show no acute pathology.  Neuro called & recommended ICU stepdown w/ neuro ck q 4h

## 2023-08-15 NOTE — PHYSICAL THERAPY INITIAL EVALUATION ADULT - DIAGNOSIS, PT EVAL
Continue: TobraDex (tobramycin-dexamethasone): ointment: 0.3-0.1% a small amount twice a day into affected eye 02- gait disorder

## 2023-08-15 NOTE — OCCUPATIONAL THERAPY INITIAL EVALUATION ADULT - PERTINENT HX OF CURRENT PROBLEM, REHAB EVAL
89 yo W female w/ PMH as noted below. Pt comes to hospital c/o dizziness this evening. Pt denied any other prodromes ( HA, fever, chills, N/V). Pt has hx bells palsy and has chronic rt facial droop.  NIH score -0 in ER additionally pt w/ elevated  systolic.     CT head 8/14: No acute intracranial pathology.  CT angio head 8/14: Calcification at the left common carotid artery bifurcation, with moderate-to-severe stenosis at the left the carotid bulb. No evidence of major vascular occlusion or aneurysm.

## 2023-08-15 NOTE — CONSULT NOTE ADULT - NS ATTEND AMEND GEN_ALL_CORE FT
89 yo w/ h/o AFib on Eliquis/ASA currently p/w transient lightheadedness otherwise with no focal deficits.  Incidental L ICAS noted unlikely cause for lightheadedness.  Suspect cardiovascular etiology for lightheadedness with bradycardia.  No need for additional neurologic w/u at this time.  May f/u as outpt for further evaluation of L ICAS.  f/u in 2 - 4 wks after discharge.

## 2023-08-15 NOTE — CONSULT NOTE ADULT - SUBJECTIVE AND OBJECTIVE BOX
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  Pt comes to hospital c/o dizziness this evening. Pt denied any other prodromes ( HA, fever, chills, N/V) .  pt has hx bells palsy and has chronic rt facial droop.  NIH score -0 in ER  additionally pt w/ elevated  systolic. .  W/U in ER  imaging shows severe left carotid bulb stenosis. CT head show no acute pathology.  Neuro called & recommended ICU stepdown w/ neuro ck q 4h (15 Aug 2023 00:17)      ROS:  Constitutional, Neurological, Psychiatric, Eyes, ENT, Cardiovascular, Respiratory, Gastrointestinal, Genitourinary, Musculoskeletal, Integumentary, Endocrine and Heme/Lymph are otherwise negative. Except for    Social History: No smoking, No drinking, No drug use    FAMILY HISTORY:  No pertinent family history in first degree relatives        HEALTH ISSUES - PROBLEM Dx:          Vital Signs Last 24 Hrs  T(C): 36.3 (15 Aug 2023 07:10), Max: 37.5 (15 Aug 2023 00:06)  T(F): 97.4 (15 Aug 2023 07:10), Max: 99.5 (15 Aug 2023 00:06)  HR: 58 (15 Aug 2023 07:10) (55 - 67)  BP: 171/74 (15 Aug 2023 07:10) (118/56 - 223/94)  BP(mean): 107 (15 Aug 2023 07:10) (80 - 135)  RR: 27 (15 Aug 2023 09:05) (18 - 31)  SpO2: 96% (15 Aug 2023 09:05) (91% - 97%)    Parameters below as of 15 Aug 2023 09:05  Patient On (Oxygen Delivery Method): room air          Neuro Exam:  Orientation: oriented to person, oriented to place and oriented to time.   Attention: normal concentrating ability and visual attention was not decreased.   Language: no difficulty naming common objects, no difficulty repeating a phrase, no difficulty writing a sentence, fluency intact, comprehension intact and reading intact.   Fund of knowledge: displays adequate knowledge of personal past history.   Cranial Nerves: visual acuity intact bilaterally, visual fields full to confrontation, pupils equal round and reactive to light, extraocular motion intact, facial sensation intact symmetrically, face symmetrical, hearing was intact bilaterally, tongue and palate midline, head turning and shoulder shrug symmetric and there was no tongue deviation with protrusion.   Motor: muscle tone was normal in all four extremities, muscle strength was normal in all four extremities and normal bulk in all four extremities.   Sensory exam: light touch was intact.   Coordination:. normal gait. balance was intact. there was no past-pointing. no tremor present.   Deep tendon reflexes:   Biceps right 2+. Biceps left 2+.    Triceps right 2+. Triceps left 2+.    Brachioradialis right 2+. Brachioradialis left 2+.    Patella right 2+. Patella left 2+.    Ankle jerk right 2+. Ankle jerk left 2+.   Plantar responses normal on the right, normal on the left.      NIHSS:     Allergies    No Known Allergies         Home Medications:  acetaminophen 325 mg oral tablet: 2 tab(s) orally every 6 hours, As needed, for pain or fever (14 Aug 2023 23:43)  aspirin 81 mg oral tablet, chewable: 1 tab(s) orally once a day (14 Aug 2023 23:43)      MEDICATIONS  (STANDING):  apixaban 2.5 milliGRAM(s) Oral every 12 hours  aspirin  chewable 81 milliGRAM(s) Oral daily  chlorhexidine 2% Cloths 1 Application(s) Topical <User Schedule>  lisinopril 20 milliGRAM(s) Oral daily  metoprolol tartrate 25 milliGRAM(s) Oral every 12 hours  pantoprazole    Tablet 40 milliGRAM(s) Oral before breakfast  simvastatin 40 milliGRAM(s) Oral at bedtime  sodium chloride 0.9%. 1000 milliLiter(s) (75 mL/Hr) IV Continuous <Continuous>    MEDICATIONS  (PRN):  acetaminophen     Tablet .. 650 milliGRAM(s) Oral every 6 hours PRN Mild Pain (1 - 3)      LABS:                        12.5   7.27  )-----------( 231      ( 15 Aug 2023 05:47 )             38.3     08-15    141  |  107  |  11  ----------------------------<  139<H>  4.1   |  22  |  0.6<L>    Ca    9.5      15 Aug 2023 05:47  Phos  3.3     08-15  Mg     2.0     08-15    TPro  7.2  /  Alb  4.1  /  TBili  0.3  /  DBili  x   /  AST  21  /  ALT  20  /  AlkPhos  92  08-14    PT/INR - ( 14 Aug 2023 19:35 )   PT: 19.80 sec;   INR: 1.71 ratio         PTT - ( 14 Aug 2023 19:35 )  PTT:45.0 sec      CT Head No Cont (08.14.23 @ 20:51)   IMPRESSION:  No acute intracranial pathology.      CT Angio Neck w/ IV Cont (08.14.23 @ 20:51)   IMPRESSION:  1. Calcification at the left common carotid artery bifurcation, with   moderate-to-severe stenosis at the left the carotid bulb.  2. No evidence of major vascular occlusion or aneurysm.     LOW GUZMÁN     89y     Female    MRN-[id number]                                                           CC:Patient is a 89y old  Female who presents with a chief complaint of acute dizziness r/o CVA (15 Aug 2023 09:21)      Neuro: hx of bells palsy with chronic L sided facial droop, patient began having dizziness that has now resolved      HPI:  89 yo W female w/ PMH as noted below.  Pt comes to hospital c/o dizziness this evening. Pt denied any other prodromes ( HA, fever, chills, N/V) .  pt has hx bells palsy and has chronic rt facial droop.  NIH score -0 in ER  additionally pt w/ elevated  systolic. .  W/U in ER  imaging shows severe left carotid bulb stenosis. CT head show no acute pathology.  Neuro called & recommended ICU stepdown w/ neuro ck q 4h (15 Aug 2023 00:17)      ROS:  Constitutional, Neurological, Psychiatric, Eyes, ENT, Cardiovascular, Respiratory, Gastrointestinal, Genitourinary, Musculoskeletal, Integumentary, Endocrine and Heme/Lymph are otherwise negative.     Social History: No smoking, No drinking, No drug use    FAMILY HISTORY:  No pertinent family history in first degree relatives        HEALTH ISSUES - PROBLEM Dx:  htn  hld  aflutter  bells palsy      Vital Signs Last 24 Hrs  T(C): 36.3 (15 Aug 2023 07:10), Max: 37.5 (15 Aug 2023 00:06)  T(F): 97.4 (15 Aug 2023 07:10), Max: 99.5 (15 Aug 2023 00:06)  HR: 58 (15 Aug 2023 07:10) (55 - 67)  BP: 171/74 (15 Aug 2023 07:10) (118/56 - 223/94)  BP(mean): 107 (15 Aug 2023 07:10) (80 - 135)  RR: 27 (15 Aug 2023 09:05) (18 - 31)  SpO2: 96% (15 Aug 2023 09:05) (91% - 97%)    Parameters below as of 15 Aug 2023 09:05  Patient On (Oxygen Delivery Method): room air          Neuro Exam:  Orientation: oriented to person, oriented to place and oriented to time.   Attention: normal concentrating ability and visual attention was not decreased.   Language: no difficulty naming common objects, no difficulty repeating a phrase, no difficulty writing a sentence, fluency intact, comprehension intact and reading intact.   Fund of knowledge: displays adequate knowledge of personal past history.   Cranial Nerves: visual acuity intact bilaterally, visual fields full to confrontation, pupils equal round and reactive to light, extraocular motion intact, facial sensation intact symmetrically, face symmetrical, tongue and palate midline, hard of hearing  Motor: muscle tone was normal in all four extremities, muscle strength was normal in all four extremities and normal bulk in all four extremities.   Sensory exam: light touch was intact.   Coordination:. balance was intact. there was no past-pointing. no tremor present.       NIHSS: 1 chronic L facial droop    Allergies    No Known Allergies         Home Medications:  acetaminophen 325 mg oral tablet: 2 tab(s) orally every 6 hours, As needed, for pain or fever (14 Aug 2023 23:43)  aspirin 81 mg oral tablet, chewable: 1 tab(s) orally once a day (14 Aug 2023 23:43)      MEDICATIONS  (STANDING):  apixaban 2.5 milliGRAM(s) Oral every 12 hours  aspirin  chewable 81 milliGRAM(s) Oral daily  chlorhexidine 2% Cloths 1 Application(s) Topical <User Schedule>  lisinopril 20 milliGRAM(s) Oral daily  metoprolol tartrate 25 milliGRAM(s) Oral every 12 hours  pantoprazole    Tablet 40 milliGRAM(s) Oral before breakfast  simvastatin 40 milliGRAM(s) Oral at bedtime  sodium chloride 0.9%. 1000 milliLiter(s) (75 mL/Hr) IV Continuous <Continuous>    MEDICATIONS  (PRN):  acetaminophen     Tablet .. 650 milliGRAM(s) Oral every 6 hours PRN Mild Pain (1 - 3)      LABS:                        12.5   7.27  )-----------( 231      ( 15 Aug 2023 05:47 )             38.3     08-15    141  |  107  |  11  ----------------------------<  139<H>  4.1   |  22  |  0.6<L>    Ca    9.5      15 Aug 2023 05:47  Phos  3.3     08-15  Mg     2.0     08-15    TPro  7.2  /  Alb  4.1  /  TBili  0.3  /  DBili  x   /  AST  21  /  ALT  20  /  AlkPhos  92  08-14    PT/INR - ( 14 Aug 2023 19:35 )   PT: 19.80 sec;   INR: 1.71 ratio         PTT - ( 14 Aug 2023 19:35 )  PTT:45.0 sec      CT Head No Cont (08.14.23 @ 20:51)   IMPRESSION:  No acute intracranial pathology.      CT Angio Neck w/ IV Cont (08.14.23 @ 20:51)   IMPRESSION:  1. Calcification at the left common carotid artery bifurcation, with   moderate-to-severe stenosis at the left the carotid bulb.  2. No evidence of major vascular occlusion or aneurysm.

## 2023-08-15 NOTE — PHYSICAL THERAPY INITIAL EVALUATION ADULT - ADDITIONAL COMMENTS
pt lives with her daughter in a private house with 6 steps to enter and 1 flight to bedroom and bathroom area with rails, pt amb with RW outside and without AD inside the house.  Pt was an active  prior to admission

## 2023-08-15 NOTE — CONSULT NOTE ADULT - SUBJECTIVE AND OBJECTIVE BOX
HPI:  89 yo W female w/ PMH as noted below.  Pt comes to hospital c/o dizziness this evening. Pt denied any other prodromes ( HA, fever, chills, N/V) .  pt has hx bells palsy and has chronic rt facial droop.  NIH score -0 in ER  additionally pt w/ elevated  systolic. .  W/U in ER  imaging shows severe left carotid bulb stenosis. CT head show no acute pathology.  Neuro called & recommended ICU stepdown w/ neuro ck q 4h ,  ptn seen and exam  at  bed  side  ptn  dtr  is  visiting at  bed side  and  providing  hx  ptn  labs  , imaging  and  medical ,  are  appreciated  and reviewed     PTN  REFERRED TO ACUTE  REHAB  FOR  EVAL AND  TX   PAST MEDICAL & SURGICAL HISTORY:  Hypertension      High cholesterol      Arthritis      H/O Bell's palsy      S/P cholecystectomy      History of           Hospital Course:    TODAY'S SUBJECTIVE & REVIEW OF SYMPTOMS:     Constitutional WNL   Cardio WNL   Resp WNL   GI WNL  Heme WNL  Endo WNL  Skin WNL  MSK WNL  Neuro WNL  Cognitive WNL  Psych WNL      MEDICATIONS  (STANDING):  apixaban 2.5 milliGRAM(s) Oral every 12 hours  aspirin  chewable 81 milliGRAM(s) Oral daily  chlorhexidine 2% Cloths 1 Application(s) Topical <User Schedule>  lisinopril 20 milliGRAM(s) Oral daily  metoprolol tartrate 25 milliGRAM(s) Oral every 12 hours  pantoprazole    Tablet 40 milliGRAM(s) Oral before breakfast  simvastatin 40 milliGRAM(s) Oral at bedtime  sodium chloride 0.9%. 1000 milliLiter(s) (75 mL/Hr) IV Continuous <Continuous>    MEDICATIONS  (PRN):  acetaminophen     Tablet .. 650 milliGRAM(s) Oral every 6 hours PRN Mild Pain (1 - 3)      FAMILY HISTORY:  No pertinent family history in first degree relatives        Allergies    No Known Allergies    Intolerances        SOCIAL HISTORY:    [  ] Etoh  [  ] Smoking  [  ] Substance abuse     Home Environment:  [  ] Home Alone  [x  ] Lives with Family dtr    [  ] Home Health Aid    Dwelling:  [  ] Apartment  [ x ] Private House  [  ] Adult Home  [  ] Skilled Nursing Facility      [  ] Short Term  [  ] Long Term  [ x ] Stairs       Elevator [  ]    FUNCTIONAL STATUS PTA: (Check all that apply)  Ambulation: [ x  ]Independent    [  ] Dependent     [  ] Non-Ambulatory  Assistive Device: [  ] SA Cane  [  ]  Q Cane  [  ] Walker  [  ]  Wheelchair  ADL : [ x] Independent  [  ]  Dependent       Vital Signs Last 24 Hrs  T(C): 36.3 (15 Aug 2023 07:10), Max: 37.5 (15 Aug 2023 00:06)  T(F): 97.4 (15 Aug 2023 07:10), Max: 99.5 (15 Aug 2023 00:06)  HR: 58 (15 Aug 2023 07:10) (55 - 67)  BP: 171/74 (15 Aug 2023 07:10) (118/56 - 223/94)  BP(mean): 107 (15 Aug 2023 07:10) (80 - 135)  RR: 27 (15 Aug 2023 09:05) (18 - 31)  SpO2: 96% (15 Aug 2023 09:05) (91% - 97%)    Parameters below as of 15 Aug 2023 09:05  Patient On (Oxygen Delivery Method): room air          PHYSICAL EXAM: Alert & Oriented X 2  rt face  drop  due to  bp  GENERAL: NAD, well-groomed, well-developed  HEAD:  Atraumatic, Normocephalic  EYES: EOMI, PERRLA, conjunctiva and sclera clear  NECK: Supple, No JVD, Normal thyroid  CHEST/LUNG: Clear to percussion bilaterally; No rales, rhonchi, wheezing, or rubs  HEART: Regular rate and rhythm; No murmurs, rubs, or gallops  ABDOMEN: Soft, Nontender, Nondistended; Bowel sounds present  EXTREMITIES:  2+ Peripheral Pulses, No clubbing, cyanosis, or edema    NERVOUS SYSTEM:  Cranial Nerves 2-12 intact [ x ] Abnormal  [  ]  ROM: WFL all extremities ( passive   ]  Abnormal [  ]  Motor Strength: WFL all extremities  [ 4/5 ]  Abnormal [  ]  Sensation: intact to light touch [  x] Abnormal [  ]  Reflexes: Symmetric [  x]  Abnormal [  ]    FUNCTIONAL STATUS:  Bed Mobility: Independent [  ]  Supervision [  ]  Needs Assistance [  ]  N/A [x ]  Transfers: Independent [  ]  Supervision [  ]  Needs Assistance [  ]  N/A [ x ]   Ambulation: Independent [  ]  Supervision [  ]  Needs Assistance [  ]  N/A [ x ]  ADL: Independent [  ] Requires Assistance [  ] N/A [ x ]  SEE PT/OT IE NOTES    LABS:                        12.5   7.27  )-----------( 231      ( 15 Aug 2023 05:47 )             38.3     08-15    141  |  107  |  11  ----------------------------<  139<H>  4.1   |  22  |  0.6<L>    Ca    9.5      15 Aug 2023 05:47  Phos  3.3     08-15  Mg     2.0     08-15    TPro  7.2  /  Alb  4.1  /  TBili  0.3  /  DBili  x   /  AST  21  /  ALT  20  /  AlkPhos  92  08-14    PT/INR - ( 14 Aug 2023 19:35 )   PT: 19.80 sec;   INR: 1.71 ratio         PTT - ( 14 Aug 2023 19:35 )  PTT:45.0 sec  Urinalysis Basic - ( 15 Aug 2023 05:47 )    Color: x / Appearance: x / SG: x / pH: x  Gluc: 139 mg/dL / Ketone: x  / Bili: x / Urobili: x   Blood: x / Protein: x / Nitrite: x   Leuk Esterase: x / RBC: x / WBC x   Sq Epi: x / Non Sq Epi: x / Bacteria: x        RADIOLOGY & ADDITIONAL STUDIES:< from: CT Head No Cont (23 @ 20:51) >  The visualized paranasal sinuses and mastoid complexes appear free of   acute disease.    Vascular calcifications are noted.    No acute depressed calvarial fracture.    IMPRESSION:    No acute intracranial pathology.    < end of copied text >      Assesment:

## 2023-08-15 NOTE — CONSULT NOTE ADULT - ASSESSMENT
Patient is a 89 year old female with hx of Bell's palsy with chronic R facial droop, HTN, HLD, aflutter on DOAC presenting with acute onset dizziness admitted to r/o CVA. CTH normal, CTA with calcification at the left common carotid artery bifurcation, with moderate to severe stenosis at the left carotid bulb.     # dizziness  # r/o CVA  - MRI brain without contrast  - aspirin 81 mg, eliquis  - lipitor 80 mg  - TTE  - q4h neuro checks  - PT/OR/PMR/speech    incomplete note Patient is a 89 year old female with hx of Bell's palsy with chronic R facial droop, HTN, HLD, aflutter on DOAC presenting with acute onset dizziness admitted to r/o CVA. CTH normal, CTA with calcification at the left common carotid artery bifurcation, with moderate to severe stenosis at the left carotid bulb.     # dizziness  # r/o CVA  - MRI brain without contrast  - aspirin 81 mg, continue eliquis  - lipitor 80 mg  - TTE  - q4h neuro checks  - PT/OR/PMR/speech    incomplete note Patient is a 89 year old female with hx of Bell's palsy with chronic R facial droop, HTN, HLD, aflutter on DOAC presenting with acute onset dizziness admitted to r/o CVA. CTH normal, CTA with calcification at the left common carotid artery bifurcation, with moderate to severe stenosis at the left carotid bulb.     # dizziness  # r/o CVA  - MRI brain without contrast  - aspirin 81 mg, continue eliquis  - lipitor 80 mg  - TTE  - q4h neuro checks  - PT/OR/PMR/speech Patient is a 89 year old female with hx of Bell's palsy with chronic R facial droop, HTN, HLD, aflutter on DOAC presenting with acute onset dizziness admitted to r/o CVA. CTH normal, CTA with calcification at the left common carotid artery bifurcation, with moderate to severe stenosis at the left carotid bulb.     # dizziness  # r/o CVA  - aspirin 81 mg, continue eliquis  - lipitor 80 mg  - continue medical management/workup for presyncope  - f/u as outpt in 2 - 4 wks

## 2023-08-15 NOTE — H&P ADULT - ASSESSMENT
1. acute dizziness r/o CVA w/ uncont HTN  2. Hx- afib, HTN bells palsy      Plan:  Adm to ICU stepdown.   cont: asa, eliquis, statin  n ck q 4  neuro eval  pt/ot  keep systolic 160-180

## 2023-08-15 NOTE — CONSULT NOTE ADULT - ASSESSMENT
IMPRESSION: Rehab of 89  y.o  f  rehab  for  r/o  cva  tia debility      PRECAUTIONS: [  ] Cardiac  [  ] Respiratory  [  ] Seizures [  ] Contact Isolation  [  ] Droplet Isolation  [ FALL ] Other    Weight Bearing Status:     RECOMMENDATION:    Out of Bed to Chair     DVT/Decubiti Prophylaxis    REHAB PLAN:     [ x  ] Bedside P/T 3-5 times a week   [ x  ]   Bedside O/T  2-3 times a week             [   ] No Rehab Therapy Indicated                   [   ]  Speech Therapy   Conditioning/ROM                                    ADL  Bed Mobility                                               Conditioning/ROM  Transfers                                                     Bed Mobility  Sitting /Standing Balance                         Transfers                                        Gait Training                                               Sitting/Standing Balance  Stair Training [   ]Applicable                    Home equipment Eval                                                                        Splinting  [   ] Only      GOALS:   ADL   [  x ]   Independent                    Transfers  [x   ] Independent                          Ambulation  [ x  ] Independent     [ x   ] With device                            [ x  ]  CG                                                         [   x]  CG                                                                  [ x  ] CG                            [    ] Min A                                                   [   ] Min A                                                              [   ] Min  A          DISCHARGE PLAN:   [   ]  Good candidate for Intensive Rehabilitation/Hospital based-4A [facility]                                             Will tolerate 3hrs Intensive Rehab Daily                                       [  xx  ]  Short Term Rehab in Skilled Nursing Facility d/w  ptn  dtr  rehab  plan  she  agree and  cont currant care                                       [    ]  Home with Outpatient or VN services                                         [    ]  Possible Candidate for Intensive Hospital based Rehab

## 2023-08-16 ENCOUNTER — TRANSCRIPTION ENCOUNTER (OUTPATIENT)
Age: 88
End: 2023-08-16

## 2023-08-16 VITALS — DIASTOLIC BLOOD PRESSURE: 74 MMHG | SYSTOLIC BLOOD PRESSURE: 182 MMHG | OXYGEN SATURATION: 95 % | HEART RATE: 60 BPM

## 2023-08-16 PROBLEM — Z86.69 PERSONAL HISTORY OF OTHER DISEASES OF THE NERVOUS SYSTEM AND SENSE ORGANS: Chronic | Status: ACTIVE | Noted: 2023-08-15

## 2023-08-16 LAB
ALBUMIN SERPL ELPH-MCNC: 3.5 G/DL — SIGNIFICANT CHANGE UP (ref 3.5–5.2)
ALP SERPL-CCNC: 71 U/L — SIGNIFICANT CHANGE UP (ref 30–115)
ALT FLD-CCNC: 16 U/L — SIGNIFICANT CHANGE UP (ref 0–41)
ANION GAP SERPL CALC-SCNC: 10 MMOL/L — SIGNIFICANT CHANGE UP (ref 7–14)
AST SERPL-CCNC: 14 U/L — SIGNIFICANT CHANGE UP (ref 0–41)
BASOPHILS # BLD AUTO: 0.04 K/UL — SIGNIFICANT CHANGE UP (ref 0–0.2)
BASOPHILS NFR BLD AUTO: 0.6 % — SIGNIFICANT CHANGE UP (ref 0–1)
BILIRUB SERPL-MCNC: 0.4 MG/DL — SIGNIFICANT CHANGE UP (ref 0.2–1.2)
BUN SERPL-MCNC: 11 MG/DL — SIGNIFICANT CHANGE UP (ref 10–20)
CALCIUM SERPL-MCNC: 9.6 MG/DL — SIGNIFICANT CHANGE UP (ref 8.4–10.5)
CHLORIDE SERPL-SCNC: 109 MMOL/L — SIGNIFICANT CHANGE UP (ref 98–110)
CO2 SERPL-SCNC: 24 MMOL/L — SIGNIFICANT CHANGE UP (ref 17–32)
CREAT SERPL-MCNC: 0.7 MG/DL — SIGNIFICANT CHANGE UP (ref 0.7–1.5)
EGFR: 80 ML/MIN/1.73M2 — SIGNIFICANT CHANGE UP
EOSINOPHIL # BLD AUTO: 0.26 K/UL — SIGNIFICANT CHANGE UP (ref 0–0.7)
EOSINOPHIL NFR BLD AUTO: 4.1 % — SIGNIFICANT CHANGE UP (ref 0–8)
GLUCOSE SERPL-MCNC: 117 MG/DL — HIGH (ref 70–99)
HCT VFR BLD CALC: 37 % — SIGNIFICANT CHANGE UP (ref 37–47)
HGB BLD-MCNC: 11.8 G/DL — LOW (ref 12–16)
IMM GRANULOCYTES NFR BLD AUTO: 0.3 % — SIGNIFICANT CHANGE UP (ref 0.1–0.3)
LYMPHOCYTES # BLD AUTO: 1.85 K/UL — SIGNIFICANT CHANGE UP (ref 1.2–3.4)
LYMPHOCYTES # BLD AUTO: 29.4 % — SIGNIFICANT CHANGE UP (ref 20.5–51.1)
MAGNESIUM SERPL-MCNC: 2.1 MG/DL — SIGNIFICANT CHANGE UP (ref 1.8–2.4)
MCHC RBC-ENTMCNC: 28.3 PG — SIGNIFICANT CHANGE UP (ref 27–31)
MCHC RBC-ENTMCNC: 31.9 G/DL — LOW (ref 32–37)
MCV RBC AUTO: 88.7 FL — SIGNIFICANT CHANGE UP (ref 81–99)
MONOCYTES # BLD AUTO: 0.68 K/UL — HIGH (ref 0.1–0.6)
MONOCYTES NFR BLD AUTO: 10.8 % — HIGH (ref 1.7–9.3)
NEUTROPHILS # BLD AUTO: 3.44 K/UL — SIGNIFICANT CHANGE UP (ref 1.4–6.5)
NEUTROPHILS NFR BLD AUTO: 54.8 % — SIGNIFICANT CHANGE UP (ref 42.2–75.2)
NRBC # BLD: 0 /100 WBCS — SIGNIFICANT CHANGE UP (ref 0–0)
PLATELET # BLD AUTO: 216 K/UL — SIGNIFICANT CHANGE UP (ref 130–400)
PMV BLD: 10.7 FL — HIGH (ref 7.4–10.4)
POTASSIUM SERPL-MCNC: 4.4 MMOL/L — SIGNIFICANT CHANGE UP (ref 3.5–5)
POTASSIUM SERPL-SCNC: 4.4 MMOL/L — SIGNIFICANT CHANGE UP (ref 3.5–5)
PROT SERPL-MCNC: 5.4 G/DL — LOW (ref 6–8)
RBC # BLD: 4.17 M/UL — LOW (ref 4.2–5.4)
RBC # FLD: 15.4 % — HIGH (ref 11.5–14.5)
SODIUM SERPL-SCNC: 143 MMOL/L — SIGNIFICANT CHANGE UP (ref 135–146)
WBC # BLD: 6.29 K/UL — SIGNIFICANT CHANGE UP (ref 4.8–10.8)
WBC # FLD AUTO: 6.29 K/UL — SIGNIFICANT CHANGE UP (ref 4.8–10.8)

## 2023-08-16 PROCEDURE — 99239 HOSP IP/OBS DSCHRG MGMT >30: CPT

## 2023-08-16 RX ORDER — APIXABAN 2.5 MG/1
1 TABLET, FILM COATED ORAL
Qty: 60 | Refills: 0
Start: 2023-08-16 | End: 2023-09-14

## 2023-08-16 RX ORDER — METOPROLOL TARTRATE 50 MG
1 TABLET ORAL
Qty: 60 | Refills: 0
Start: 2023-08-16 | End: 2023-09-14

## 2023-08-16 RX ORDER — LISINOPRIL 2.5 MG/1
40 TABLET ORAL DAILY
Refills: 0 | Status: DISCONTINUED | OUTPATIENT
Start: 2023-08-17 | End: 2023-08-16

## 2023-08-16 RX ORDER — LISINOPRIL 2.5 MG/1
20 TABLET ORAL ONCE
Refills: 0 | Status: COMPLETED | OUTPATIENT
Start: 2023-08-16 | End: 2023-08-16

## 2023-08-16 RX ORDER — METOPROLOL TARTRATE 50 MG
1 TABLET ORAL
Qty: 30 | Refills: 0
Start: 2023-08-16 | End: 2023-09-14

## 2023-08-16 RX ORDER — LISINOPRIL 2.5 MG/1
1 TABLET ORAL
Qty: 30 | Refills: 0
Start: 2023-08-16 | End: 2023-09-14

## 2023-08-16 RX ORDER — APIXABAN 2.5 MG/1
1 TABLET, FILM COATED ORAL
Qty: 0 | Refills: 0 | DISCHARGE
Start: 2023-08-16

## 2023-08-16 RX ADMIN — PANTOPRAZOLE SODIUM 40 MILLIGRAM(S): 20 TABLET, DELAYED RELEASE ORAL at 06:29

## 2023-08-16 RX ADMIN — APIXABAN 2.5 MILLIGRAM(S): 2.5 TABLET, FILM COATED ORAL at 06:29

## 2023-08-16 RX ADMIN — LISINOPRIL 20 MILLIGRAM(S): 2.5 TABLET ORAL at 12:41

## 2023-08-16 RX ADMIN — Medication 81 MILLIGRAM(S): at 12:42

## 2023-08-16 RX ADMIN — LISINOPRIL 20 MILLIGRAM(S): 2.5 TABLET ORAL at 06:32

## 2023-08-16 RX ADMIN — CHLORHEXIDINE GLUCONATE 1 APPLICATION(S): 213 SOLUTION TOPICAL at 12:47

## 2023-08-16 NOTE — DISCHARGE NOTE PROVIDER - NSDCMRMEDTOKEN_GEN_ALL_CORE_FT
acetaminophen 325 mg oral tablet: 2 tab(s) orally every 6 hours, As needed, for pain or fever  apixaban 2.5 mg oral tablet: 1 tab(s) orally every 12 hours  aspirin 81 mg oral tablet, chewable: 1 tab(s) orally once a day  lisinopril 20 mg oral tablet: 1 tab(s) orally once a day  metoprolol succinate 25 mg oral tablet, extended release: 1 tab(s) orally once a day  simvastatin 40 mg oral tablet: 1 tab(s) orally once a day (at bedtime)   acetaminophen 325 mg oral tablet: 2 tab(s) orally every 6 hours, As needed, for pain or fever  apixaban 2.5 mg oral tablet: 1 tab(s) orally every 12 hours  aspirin 81 mg oral tablet, chewable: 1 tab(s) orally once a day  lisinopril 40 mg oral tablet: 1 tab(s) orally once a day  metoprolol tartrate 25 mg oral tablet: 1 tab(s) orally 2 times a day  simvastatin 40 mg oral tablet: 1 tab(s) orally once a day (at bedtime)

## 2023-08-16 NOTE — PROGRESS NOTE ADULT - ASSESSMENT
89yo F with a medical history of HTN,  HLD,   aflutter on metoprolol and Eliquis Chronic right Bell's palsy  presented to ED with  2 episodes of dizziness. First episode was this morning while in bed felt dizziness for few minutes resolved all day until tonight around 630 turned her head and felt dizzy last for few minutes and resolved.  Patient currently asymptomatic1.     acute dizziness r/o CVA       asa, Eliquis statin  mri brain  tte  neuro eval  pt/ot  keep systolic 160-180  
89yo F with a medical history of HTN,  HLD,   aflutter on metoprolol and Eliquis Chronic right Bell's palsy  presented to ED with  2 episodes of dizziness. First episode was this morning while in bed felt dizziness for few minutes resolved all day until tonight around 630 turned her head and felt dizzy last for few minutes and resolved.  Patient currently asymptomatic1.     acute dizziness        - cleared by neuro for DC on asa, Eliquis and statin   - no further testing needed   - DC home today   - 35min spent on dc

## 2023-08-16 NOTE — DISCHARGE NOTE PROVIDER - PROVIDER TOKENS
PROVIDER:[TOKEN:[[id number]],FOLLOWUP:[Routine]],PROVIDER:[TOKEN:[[id number]],FOLLOWUP:[2 weeks]]

## 2023-08-16 NOTE — DISCHARGE NOTE PROVIDER - HOSPITAL COURSE
89 yo W female w/ PMH as noted below.  Pt comes to hospital c/o dizziness this evening. Pt denied any other prodromes ( HA, fever, chills, N/V) .  pt has hx bells palsy and has chronic rt facial droop.  NIH score -0 in ER  additionally pt w/ elevated  systolic. .  W/U in ER  imaging shows severe left carotid bulb stenosis. CT head show no acute pathology.  Neuro called & recommended ICU stepdown w/ neuro ck q 4h.    Neurology recommended no MRI nd to follow up outpatient with neurology.   Patient noted to be slightly bradycardic (50s). BB titrated down. Will DC home metoprolol tartrate 25mg BID and send home on metoprolol succinate 25qd.  Physiatry recommending  Short Term Rehab in Skilled Nursing Facility.     Patient is medically stable and ready for discharge. 89 yo W female w/ PMH as noted below.  Pt comes to hospital c/o dizziness this evening. Pt denied any other prodromes ( HA, fever, chills, N/V) .  pt has hx bells palsy and has chronic rt facial droop.  NIH score -0 in ER  additionally pt w/ elevated  systolic. .  W/U in ER  imaging shows severe left carotid bulb stenosis. CT head show no acute pathology.  Neuro called & recommended ICU stepdown w/ neuro ck q 4h.    Neurology recommended no MRI and to follow up outpatient with neurology.   Patient's lisinopril increased from 20mg to 40mg qd.   Physiatry recommending  Short Term Rehab in Skilled Nursing Facility. Patient refused STR planned for home PT.    Patient is medically stable and ready for discharge.

## 2023-08-16 NOTE — DISCHARGE NOTE PROVIDER - CARE PROVIDER_API CALL
Dannie Francis  Cardiovascular Disease  [address] [place] [id number]  Phone: [phone]  Fax: [phone]  Follow Up Time: Routine    Martinez Ocampo  Neurology  [address] [place] [address], [address]  [place], [place] [id number]  Phone: [phone]  Fax: [phone]  Follow Up Time: 2 weeks

## 2023-08-16 NOTE — DISCHARGE NOTE NURSING/CASE MANAGEMENT/SOCIAL WORK - PATIENT PORTAL LINK FT
You can access the FollowMyHealth Patient Portal offered by [facility] by registering at the following website: [email or web address]. By joining [facility]’s FollowMyHealth portal, you will also be able to view your health information using other applications (apps) compatible with our system.

## 2023-08-16 NOTE — DISCHARGE NOTE PROVIDER - NSDCCPCAREPLAN_GEN_ALL_CORE_FT
PRINCIPAL DISCHARGE DIAGNOSIS  Diagnosis: Dizziness  Assessment and Plan of Treatment: You were dizzy. You were evaluated by neurology who recommended no intervention and to follow up outpatient. We discussed with cardiology if slow heart rate could be affecting your dizziness which they stated was unlikely, recommended to continue beta blocker dose. You had high blood pressure during your hospital stay so your lisinopril was increased. Please take all medications as prescribed.      SECONDARY DISCHARGE DIAGNOSES  Diagnosis: Carotid stenosis  Assessment and Plan of Treatment:

## 2023-08-16 NOTE — DISCHARGE NOTE PROVIDER - NSDCFUSCHEDAPPT_GEN_ALL_CORE_FT
Dannie Francis Physician [facility]  CARDIOLOGY [address]  Scheduled Appointment: 10/20/2023

## 2023-08-16 NOTE — DISCHARGE NOTE PROVIDER - CARE PROVIDERS DIRECT ADDRESSES
[email or web address]

## 2023-08-16 NOTE — DISCHARGE NOTE NURSING/CASE MANAGEMENT/SOCIAL WORK - NSDCVIVACCINE_GEN_ALL_CORE_FT
Tdap; 25-Jul-2023 15:04; Noman Calixto); [employer]; [id number] (Exp. Date: 18-Aug-2025); IntraMuscular; Deltoid Left.; 0.5 milliLiter(s); VIS (VIS Published: 09-May-2013, VIS Presented: 25-Jul-2023);

## 2023-08-16 NOTE — PROGRESS NOTE ADULT - SUBJECTIVE AND OBJECTIVE BOX
Patient seen and evaluated this am, comfortable in bed, no complaints       T(F): 96.9 (08-16-23 @ 07:01), Max: 96.9 (08-16-23 @ 07:01)  HR: 53 (08-16-23 @ 04:00)  BP: 175/74 (08-16-23 @ 04:00)  RR: 20 (08-16-23 @ 07:01)  SpO2: 94% (08-16-23 @ 04:00) (92% - 97%)    PHYSICAL EXAM:  GENERAL: NAD  HEAD:  Atraumatic, Normocephalic  EYES: EOMI, PERRLA, conjunctiva and sclera clear  NERVOUS SYSTEM:  no focal deficits   CHEST/LUNG: Clear to percussion bilaterally; No rales, rhonchi, wheezing, or rubs  HEART: Regular rate and rhythm; No murmurs, rubs, or gallops  ABDOMEN: Soft, Nontender, Nondistended; Bowel sounds present  EXTREMITIES:  2+ Peripheral Pulses, No clubbing, cyanosis, or edema    LABS  08-16    143  |  109  |  11  ----------------------------<  117<H>  4.4   |  24  |  0.7    Ca    9.6      16 Aug 2023 05:52  Phos  3.3     08-15  Mg     2.1     08-16    TPro  5.4<L>  /  Alb  3.5  /  TBili  0.4  /  DBili  x   /  AST  14  /  ALT  16  /  AlkPhos  71  08-16                          11.8   6.29  )-----------( 216      ( 16 Aug 2023 05:52 )             37.0     PT/INR - ( 14 Aug 2023 19:35 )   PT: 19.80 sec;   INR: 1.71 ratio         PTT - ( 14 Aug 2023 19:35 )  PTT:45.0 sec    CARDIAC ENZYMES    Troponin T, Serum: 0.01 ng/mL (08-14-23 @ 19:35)    < from: 12 Lead ECG (08.15.23 @ 08:51) >  Diagnosis Line Atrial flutter with variable A-V block  Left axis deviation  Inferior infarct , age undetermined  Anteroseptal infarct , age undetermined  Abnormal ECG    < end of copied text >  < from: TTE Echo Complete w/o Contrast w/ Doppler (08.15.23 @ 09:11) >  Summary:   1. LV Ejection Fraction by Ricardo's Method with a biplane EF of 72 %.   2. Mildly enlarged left atrium.   3. Mildly enlarged right atrium.   4. Moderate mitral valve regurgitation.   5. Structurally normal mitral valve, with normal leaflet excursion.   6. Moderate tricuspid regurgitation.   7. Normal trileaflet aortic valve with normal opening.   8. Estimated pulmonary artery systolic pressure is 78.0 mmHg assuming a   right atrial pressure of 3 mmHg, which is consistent with severe   pulmonary hypertension.   9. There is mild aortic root calcification.    < end of copied text >      RADIOLOGY  < from: CT Angio Head w/ IV Cont (08.14.23 @ 20:52) >  IMPRESSION:    1. Calcification at the left common carotid artery bifurcation, with   moderate-to-severe stenosis at the left the carotid bulb.    2. No evidence of major vascular occlusion or aneurysm.      < end of copied text >    MEDICATIONS  (STANDING):  apixaban 2.5 milliGRAM(s) Oral every 12 hours  aspirin  chewable 81 milliGRAM(s) Oral daily  chlorhexidine 2% Cloths 1 Application(s) Topical <User Schedule>  lisinopril 20 milliGRAM(s) Oral once  metoprolol tartrate 12.5 milliGRAM(s) Oral two times a day  pantoprazole    Tablet 40 milliGRAM(s) Oral before breakfast  simvastatin 40 milliGRAM(s) Oral at bedtime    MEDICATIONS  (PRN):  acetaminophen     Tablet .. 650 milliGRAM(s) Oral every 6 hours PRN Mild Pain (1 - 3)    
 Patient seen and evaluated this am, reports feeling dizzy associated with difficulty ambulating       T(F): 97.4 (08-15-23 @ 07:10), Max: 99.5 (08-15-23 @ 00:06)  HR: 58 (08-15-23 @ 07:10)  BP: 171/74 (08-15-23 @ 07:10)  RR: 27 (08-15-23 @ 09:05)  SpO2: 96% (08-15-23 @ 09:05) (91% - 97%)    PHYSICAL EXAM:  GENERAL: NAD  HEAD:  Atraumatic, Normocephalic  EYES: EOMI, PERRLA, conjunctiva and sclera clear  NERVOUS SYSTEM:  R facial droop, no focal motor deficits   CHEST/LUNG: Clear to percussion bilaterally; No rales, rhonchi, wheezing, or rubs  HEART: Regular rate and rhythm; No murmurs, rubs, or gallops  ABDOMEN: Soft, Nontender, Nondistended; Bowel sounds present  EXTREMITIES:  2+ Peripheral Pulses, No clubbing, cyanosis, or edema    LABS  08-15    141  |  107  |  11  ----------------------------<  139<H>  4.1   |  22  |  0.6<L>    Ca    9.5      15 Aug 2023 05:47  Phos  3.3     08-15  Mg     2.0     08-15    TPro  7.2  /  Alb  4.1  /  TBili  0.3  /  DBili  x   /  AST  21  /  ALT  20  /  AlkPhos  92  08-14                          12.5   7.27  )-----------( 231      ( 15 Aug 2023 05:47 )             38.3     PT/INR - ( 14 Aug 2023 19:35 )   PT: 19.80 sec;   INR: 1.71 ratio         PTT - ( 14 Aug 2023 19:35 )  PTT:45.0 sec    CARDIAC ENZYMES      Troponin T, Serum: 0.01 ng/mL (08-14-23 @ 19:35)    < from: 12 Lead ECG (08.14.23 @ 19:32) >  Diagnosis Line    Atrial fibrillation with slow ventricular response  Left axis deviation  Anteroseptal infarct , age undetermined  Abnormal ECG    < end of copied text >  < from: TTE Echo Complete w/o Contrast w/ Doppler (07.26.23 @ 09:43) >  Summary:   1. LV Ejection Fraction by Ricardo's Method with a biplane EF of 68 %.   2. Mildly enlarged left atrium.   3. Mild mitral annular calcification.   4. Mild mitral valve regurgitation.   5. Mild aortic regurgitation.   6. Sclerotic aortic valve with normal opening.    < end of copied text >      RADIOLOGY  < from: CT Angio Head w/ IV Cont (08.14.23 @ 20:52) >  IMPRESSION:    1. Calcification at the left common carotid artery bifurcation, with   moderate-to-severe stenosis at the left the carotid bulb.    2. No evidence of major vascular occlusion or aneurysm.    < end of copied text >  < from: CT Angio Head w/ IV Cont (08.14.23 @ 20:52) >  IMPRESSION:    1. Calcification at the left common carotid artery bifurcation, with   moderate-to-severe stenosis at the left the carotid bulb.    2. No evidence of major vascular occlusion or aneurysm.    < end of copied text >    MEDICATIONS  (STANDING):  apixaban 2.5 milliGRAM(s) Oral every 12 hours  aspirin  chewable 81 milliGRAM(s) Oral daily  chlorhexidine 2% Cloths 1 Application(s) Topical <User Schedule>  lisinopril 20 milliGRAM(s) Oral daily  metoprolol tartrate 25 milliGRAM(s) Oral every 12 hours  pantoprazole    Tablet 40 milliGRAM(s) Oral before breakfast  simvastatin 40 milliGRAM(s) Oral at bedtime  sodium chloride 0.9%. 1000 milliLiter(s) (75 mL/Hr) IV Continuous <Continuous>    MEDICATIONS  (PRN):  acetaminophen     Tablet .. 650 milliGRAM(s) Oral every 6 hours PRN Mild Pain (1 - 3)

## 2023-08-16 NOTE — DISCHARGE NOTE NURSING/CASE MANAGEMENT/SOCIAL WORK - NSDCPEFALRISK_GEN_ALL_CORE
For information on Fall & Injury Prevention, visit: [email or web address] OR  [email or web address] OR  https://www.cdc.gov/steadi/patient.html

## 2023-08-17 RX ORDER — APIXABAN 5 MG/1
5 TABLET, FILM COATED ORAL
Refills: 0 | Status: DISCONTINUED | COMMUNITY
End: 2023-08-17

## 2023-08-18 LAB
-  AMIKACIN: SIGNIFICANT CHANGE UP
-  AMOXICILLIN/CLAVULANIC ACID: SIGNIFICANT CHANGE UP
-  AMPICILLIN/SULBACTAM: SIGNIFICANT CHANGE UP
-  AMPICILLIN: SIGNIFICANT CHANGE UP
-  AZTREONAM: SIGNIFICANT CHANGE UP
-  CEFAZOLIN: SIGNIFICANT CHANGE UP
-  CEFEPIME: SIGNIFICANT CHANGE UP
-  CEFOXITIN: SIGNIFICANT CHANGE UP
-  CEFTRIAXONE: SIGNIFICANT CHANGE UP
-  CIPROFLOXACIN: SIGNIFICANT CHANGE UP
-  ERTAPENEM: SIGNIFICANT CHANGE UP
-  GENTAMICIN: SIGNIFICANT CHANGE UP
-  LEVOFLOXACIN: SIGNIFICANT CHANGE UP
-  MEROPENEM: SIGNIFICANT CHANGE UP
-  NITROFURANTOIN: SIGNIFICANT CHANGE UP
-  PIPERACILLIN/TAZOBACTAM: SIGNIFICANT CHANGE UP
-  TOBRAMYCIN: SIGNIFICANT CHANGE UP
-  TRIMETHOPRIM/SULFAMETHOXAZOLE: SIGNIFICANT CHANGE UP
CULTURE RESULTS: SIGNIFICANT CHANGE UP
METHOD TYPE: SIGNIFICANT CHANGE UP
ORGANISM # SPEC MICROSCOPIC CNT: SIGNIFICANT CHANGE UP
ORGANISM # SPEC MICROSCOPIC CNT: SIGNIFICANT CHANGE UP
SPECIMEN SOURCE: SIGNIFICANT CHANGE UP

## 2023-08-20 DIAGNOSIS — I48.92 UNSPECIFIED ATRIAL FLUTTER: ICD-10-CM

## 2023-08-20 DIAGNOSIS — R20.0 ANESTHESIA OF SKIN: ICD-10-CM

## 2023-08-20 DIAGNOSIS — E78.00 PURE HYPERCHOLESTEROLEMIA, UNSPECIFIED: ICD-10-CM

## 2023-08-20 DIAGNOSIS — I65.29 OCCLUSION AND STENOSIS OF UNSPECIFIED CAROTID ARTERY: ICD-10-CM

## 2023-08-20 DIAGNOSIS — G51.0 BELL'S PALSY: ICD-10-CM

## 2023-08-20 DIAGNOSIS — Z79.01 LONG TERM (CURRENT) USE OF ANTICOAGULANTS: ICD-10-CM

## 2023-08-20 DIAGNOSIS — I10 ESSENTIAL (PRIMARY) HYPERTENSION: ICD-10-CM

## 2023-08-20 DIAGNOSIS — M19.90 UNSPECIFIED OSTEOARTHRITIS, UNSPECIFIED SITE: ICD-10-CM

## 2023-08-25 ENCOUNTER — APPOINTMENT (OUTPATIENT)
Dept: CARDIOLOGY | Facility: CLINIC | Age: 88
End: 2023-08-25
Payer: MEDICARE

## 2023-08-25 VITALS
DIASTOLIC BLOOD PRESSURE: 80 MMHG | OXYGEN SATURATION: 95 % | HEART RATE: 56 BPM | HEIGHT: 62 IN | BODY MASS INDEX: 23.55 KG/M2 | SYSTOLIC BLOOD PRESSURE: 124 MMHG | WEIGHT: 128 LBS

## 2023-08-25 DIAGNOSIS — G51.0 BELL'S PALSY: ICD-10-CM

## 2023-08-25 PROCEDURE — 99214 OFFICE O/P EST MOD 30 MIN: CPT

## 2023-08-25 NOTE — ASSESSMENT
[FreeTextEntry1] : 89 yo hx [place] htn hld chronic bells palsy. She had syncope 7/23. Not sure if fell. Found in aflutter Now chest tender. No palpitations.. EKG reviewed. She has 24 hour help. She walks at times outside.ECHO 7/23 mild mr mild ai.Active in house. She cooks.. She goes up and down steps. Reviewed all meds.  Aware risk AC. In hosp 8/23 felt dizzy. Neg W/U  .Lisinopril increased in hospital. She now feels tired. Legs swollen. HCTZ stopped in hosp. Hospital records reviewed to be checked by [facility]. Check BMP 2 weeks

## 2023-08-25 NOTE — HISTORY OF PRESENT ILLNESS
[FreeTextEntry1] : 89 yo hx [place] htn hld chronic bells palsy. She had syncope 7/23. Not sure if fell. Found in aflutter Now chest tender. No palpitations. In hosp 8/23 felt dizzy. Neg W/U  .Lisinopril increased in hospital. She now feels tired

## 2023-08-30 ENCOUNTER — APPOINTMENT (OUTPATIENT)
Dept: CARDIOLOGY | Facility: CLINIC | Age: 88
End: 2023-08-30
Payer: MEDICARE

## 2023-08-30 PROCEDURE — 93000 ELECTROCARDIOGRAM COMPLETE: CPT | Mod: 59

## 2023-08-30 PROCEDURE — 93246 EXT ECG>7D<15D RECORDING: CPT

## 2023-08-30 PROCEDURE — 99211 OFF/OP EST MAY X REQ PHY/QHP: CPT | Mod: 25

## 2023-09-18 ENCOUNTER — APPOINTMENT (OUTPATIENT)
Dept: CARDIOLOGY | Facility: CLINIC | Age: 88
End: 2023-09-18
Payer: MEDICARE

## 2023-09-18 ENCOUNTER — RX CHANGE (OUTPATIENT)
Age: 88
End: 2023-09-18

## 2023-09-18 PROCEDURE — 99211 OFF/OP EST MAY X REQ PHY/QHP: CPT

## 2023-09-18 PROCEDURE — 93246 EXT ECG>7D<15D RECORDING: CPT | Mod: 76

## 2023-09-18 RX ORDER — HYDROCHLOROTHIAZIDE 12.5 MG/1
12.5 TABLET ORAL DAILY
Qty: 30 | Refills: 5 | Status: DISCONTINUED | COMMUNITY
Start: 2023-08-25 | End: 2023-09-18

## 2023-10-09 NOTE — ED ADULT NURSE NOTE - NSFALLRISKASMT_ED_ALL_ED_DT
Medication requested addressed by MD     budesonide (PULMICORT FLEXHALER) 90 MCG/ACT inhaler 1 each 11 10/4/2023 --    Sig - Route: Inhale 1 puff into the lungs in the morning and 1 puff in the evening. - Inhalation    Sent to pharmacy as: Budesonide 90 MCG/ACT Inhalation Aerosol Powder Breath Activated (PULMICORT FLEXHALER)    Class: Eprescribe    E-Prescribing Status: Receipt confirmed by pharmacy (10/4/2023  5:13 PM CDT)       14-Aug-2023 19:52

## 2023-10-20 ENCOUNTER — APPOINTMENT (OUTPATIENT)
Dept: CARDIOLOGY | Facility: CLINIC | Age: 88
End: 2023-10-20

## 2023-10-31 ENCOUNTER — APPOINTMENT (OUTPATIENT)
Dept: CARDIOLOGY | Facility: CLINIC | Age: 88
End: 2023-10-31
Payer: MEDICARE

## 2023-10-31 VITALS
DIASTOLIC BLOOD PRESSURE: 80 MMHG | SYSTOLIC BLOOD PRESSURE: 120 MMHG | HEIGHT: 62 IN | HEART RATE: 54 BPM | BODY MASS INDEX: 23.19 KG/M2 | OXYGEN SATURATION: 93 % | WEIGHT: 126 LBS

## 2023-10-31 PROCEDURE — 99214 OFFICE O/P EST MOD 30 MIN: CPT

## 2024-01-08 ENCOUNTER — RX RENEWAL (OUTPATIENT)
Age: 89
End: 2024-01-08

## 2024-02-27 ENCOUNTER — APPOINTMENT (OUTPATIENT)
Dept: CARDIOLOGY | Facility: CLINIC | Age: 89
End: 2024-02-27
Payer: MEDICARE

## 2024-02-27 VITALS
DIASTOLIC BLOOD PRESSURE: 78 MMHG | BODY MASS INDEX: 22.45 KG/M2 | SYSTOLIC BLOOD PRESSURE: 134 MMHG | OXYGEN SATURATION: 93 % | WEIGHT: 122 LBS | HEART RATE: 77 BPM | HEIGHT: 62 IN

## 2024-02-27 PROCEDURE — 99214 OFFICE O/P EST MOD 30 MIN: CPT

## 2024-02-27 NOTE — HISTORY OF PRESENT ILLNESS
[FreeTextEntry1] : 89 yo hx  htn hld chronic bells palsy. She had syncope 7/23. Not sure if fell. Found in aflutter . No palpitations. In hosp 8/23 felt dizzy. Neg W/U  .She no longer feels tired. She cooks nd shops. She goes up down steps. This week got up toilet . Then walk fell. No chest pain sob palpitations. Monitors past 9/24 neg. Will consider implantable loop recorder.

## 2024-02-27 NOTE — DISCUSSION/SUMMARY
[FreeTextEntry1] : 89 yo hx [place] htn hld chronic bells palsy. She had syncope 7/23. Not sure if fell. Found in aflutter Now chest tender. No palpitations. In hosp 8/23 felt dizzy. Neg W/U  .She no longer feels tired. She cooks and shops. She goes up down steps. She needs tooth pulled Can stop AC 2 days..Continue be active. MCOT 9/23 aflutter avg 47 bpm. Pauses 3.3 sec. Reviewed with patient and family. Continue meds . Aware bradycardia. Reviewed AC. Reviewed possible inplantable loop recorder.

## 2024-02-27 NOTE — REVIEW OF SYSTEMS
[Hearing Loss] : hearing loss [Dyspnea on exertion] : dyspnea during exertion [Joint Pain] : joint pain [Hand Pain] : hand pain [Fever] : no fever [Blurry Vision] : no blurred vision [Chills] : no chills [SOB] : no shortness of breath [Wheezing] : no wheezing [Abdominal Pain] : no abdominal pain [Rash] : no rash [Dysuria] : no dysuria [Dizziness] : no dizziness [Confusion] : no confusion was observed [Easy Bleeding] : no tendency for easy bleeding

## 2024-04-18 NOTE — PHYSICAL THERAPY INITIAL EVALUATION ADULT - LEVEL OF INDEPENDENCE: STAND/SIT, REHAB EVAL
[de-identified] : Assessment: Patient is a 49-year-old female with left shoulder calcific tendinitis.  Plan: I had a long discussion with the patient today regarding the nature of their diagnosis and treatment plan. We discussed the risks and benefits of no treatment as well as nonoperative and operative treatments.  I reviewed the x-ray of the shoulder that showed no acute fracture or dislocation with evidence of calcific tendinitis.  I reviewed the MRI that showed partial-thickness tearing of the subscapularis and evidence of calcific tendinitis.  At this time I am recommending continue conservative treatment including ice, heat, rest, over-the-counter anti-inflammatories and physical therapy both formally and on her own for symptomatic relief.  She was given new prescriptions today and GI precautions were discussed.  I am also recommending that she be referred for percutaneous lavage injection at this time.  She states that she is nervous about this procedure and would like to try another injection which I feel is reasonable.  Due to this I am also recommending a second left shoulder subacromial injection be administered in the office today.  She tolerated the procedure well with no adverse effects.  She will follow-up in 2 months for repeat evaluation to reassess as needed.  We did discuss percutaneous cutaneous lavage versus surgical intervention in the future versus surgical intervention pending reevaluation in the office.   The patient verbalizes their understanding and agrees with the plan.  All questions were answered to their satisfaction. contact guard

## 2024-05-15 NOTE — ED PROCEDURE NOTE - NS ED PROC PERFORMED BY1 FT
What Type Of Note Output Would You Prefer (Optional)?: Bullet Format
How Severe Is Your Skin Lesion?: mild
Has Your Skin Lesion Been Treated?: not been treated
Is This A New Presentation, Or A Follow-Up?: Skin Lesions
Juan C

## 2024-06-20 ENCOUNTER — APPOINTMENT (OUTPATIENT)
Dept: CARDIOLOGY | Facility: CLINIC | Age: 89
End: 2024-06-20

## 2024-06-20 VITALS
WEIGHT: 123 LBS | HEIGHT: 62 IN | OXYGEN SATURATION: 95 % | DIASTOLIC BLOOD PRESSURE: 84 MMHG | BODY MASS INDEX: 22.63 KG/M2 | HEART RATE: 63 BPM | SYSTOLIC BLOOD PRESSURE: 132 MMHG

## 2024-06-20 DIAGNOSIS — R55 SYNCOPE AND COLLAPSE: ICD-10-CM

## 2024-06-20 DIAGNOSIS — E78.5 HYPERLIPIDEMIA, UNSPECIFIED: ICD-10-CM

## 2024-06-20 DIAGNOSIS — I82.409 ACUTE EMBOLISM AND THROMBOSIS OF UNSPECIFIED DEEP VEINS OF UNSPECIFIED LOWER EXTREMITY: ICD-10-CM

## 2024-06-20 DIAGNOSIS — I48.92 UNSPECIFIED ATRIAL FLUTTER: ICD-10-CM

## 2024-06-20 DIAGNOSIS — Z86.718 PERSONAL HISTORY OF OTHER VENOUS THROMBOSIS AND EMBOLISM: ICD-10-CM

## 2024-06-20 DIAGNOSIS — I10 ESSENTIAL (PRIMARY) HYPERTENSION: ICD-10-CM

## 2024-06-20 PROCEDURE — 99213 OFFICE O/P EST LOW 20 MIN: CPT | Mod: 25

## 2024-06-20 PROCEDURE — 93000 ELECTROCARDIOGRAM COMPLETE: CPT

## 2024-06-20 RX ORDER — ASPIRIN 81 MG
81 TABLET, DELAYED RELEASE (ENTERIC COATED) ORAL DAILY
Refills: 0 | Status: ACTIVE | COMMUNITY

## 2024-06-20 RX ORDER — METOPROLOL TARTRATE 25 MG/1
25 TABLET, FILM COATED ORAL
Qty: 60 | Refills: 3 | Status: ACTIVE | COMMUNITY

## 2024-06-20 RX ORDER — APIXABAN 2.5 MG/1
2.5 TABLET, FILM COATED ORAL
Qty: 60 | Refills: 5 | Status: ACTIVE | COMMUNITY
Start: 2023-08-17

## 2024-06-20 NOTE — REVIEW OF SYSTEMS
[Hearing Loss] : hearing loss [Dyspnea on exertion] : dyspnea during exertion [Joint Pain] : joint pain [Hand Pain] : hand pain [Fever] : no fever [Chills] : no chills [Blurry Vision] : no blurred vision [SOB] : no shortness of breath [Wheezing] : no wheezing [Abdominal Pain] : no abdominal pain [Dysuria] : no dysuria [Rash] : no rash [Dizziness] : no dizziness [Confusion] : no confusion was observed [Easy Bleeding] : no tendency for easy bleeding

## 2024-06-20 NOTE — HISTORY OF PRESENT ILLNESS
[FreeTextEntry1] : 89 yo hx  htn hld chronic bells palsy. She had syncope 7/23. Not sure if fell. Found in aflutter . No palpitations. In hosp 8/23 felt dizzy. Neg W/U  .She no longer feels tired. She cooks nd shops. She goes up down steps. T No chest pain sob palpitations. Monitors past 9/24 neg. Will consider implantable loop recorder.

## 2024-06-20 NOTE — DISCUSSION/SUMMARY
[FreeTextEntry1] : 89 yo hx [place] htn hld chronic bells palsy. She had syncope 7/23. Not sure if fell. Found in aflutter Now chest tender. No palpitations. In hosp 8/23 felt dizzy. Neg W/U  .She no longer feels tired. She cooks and shops. She goes up down steps. She needs tooth pulled Can stop AC 2 days..Continue be active. MCOT 9/23 aflutter avg 47 bpm. Pauses 3.3 sec. Reviewed with patient and family. Continue meds . Aware bradycardia. Reviewed AC. Reviewed possible inplantable loop recorder.EKG reviewd. No need ILR now.

## 2024-06-21 RX ORDER — SIMVASTATIN 40 MG/1
40 TABLET, FILM COATED ORAL DAILY
Qty: 90 | Refills: 3 | Status: ACTIVE | COMMUNITY
Start: 1900-01-01 | End: 1900-01-01

## 2024-06-21 RX ORDER — HYDROCHLOROTHIAZIDE 12.5 MG/1
12.5 TABLET ORAL
Qty: 90 | Refills: 3 | Status: ACTIVE | COMMUNITY
Start: 1900-01-01 | End: 1900-01-01

## 2024-06-21 RX ORDER — LISINOPRIL 20 MG/1
20 TABLET ORAL DAILY
Qty: 90 | Refills: 3 | Status: ACTIVE | COMMUNITY
Start: 1900-01-01 | End: 1900-01-01

## 2024-07-01 NOTE — ED ADULT NURSE NOTE - HOW OFTEN DO YOU HAVE A DRINK CONTAINING ALCOHOL?
Detail Level: Detailed Quality 47: Advance Care Plan: Advance Care Planning discussed and documented; advance care plan or surrogate decision maker documented in the medical record. Quality 226: Preventive Care And Screening: Tobacco Use: Screening And Cessation Intervention: Patient screened for tobacco use and is an ex/non-smoker Never

## 2024-08-13 ENCOUNTER — EMERGENCY (EMERGENCY)
Facility: HOSPITAL | Age: 89
LOS: 0 days | Discharge: HOME | End: 2024-08-13
Attending: EMERGENCY MEDICINE
Payer: MEDICARE

## 2024-08-13 VITALS
SYSTOLIC BLOOD PRESSURE: 161 MMHG | HEART RATE: 57 BPM | OXYGEN SATURATION: 97 % | RESPIRATION RATE: 16 BRPM | WEIGHT: 125 LBS | TEMPERATURE: 98 F | DIASTOLIC BLOOD PRESSURE: 64 MMHG

## 2024-08-13 DIAGNOSIS — M13.80 OTHER SPECIFIED ARTHRITIS, UNSPECIFIED SITE: ICD-10-CM

## 2024-08-13 DIAGNOSIS — M25.561 PAIN IN RIGHT KNEE: ICD-10-CM

## 2024-08-13 DIAGNOSIS — Z98.891 HISTORY OF UTERINE SCAR FROM PREVIOUS SURGERY: Chronic | ICD-10-CM

## 2024-08-13 DIAGNOSIS — M71.21 SYNOVIAL CYST OF POPLITEAL SPACE [BAKER], RIGHT KNEE: ICD-10-CM

## 2024-08-13 DIAGNOSIS — I10 ESSENTIAL (PRIMARY) HYPERTENSION: ICD-10-CM

## 2024-08-13 DIAGNOSIS — Z90.49 ACQUIRED ABSENCE OF OTHER SPECIFIED PARTS OF DIGESTIVE TRACT: Chronic | ICD-10-CM

## 2024-08-13 DIAGNOSIS — M25.461 EFFUSION, RIGHT KNEE: ICD-10-CM

## 2024-08-13 DIAGNOSIS — E78.5 HYPERLIPIDEMIA, UNSPECIFIED: ICD-10-CM

## 2024-08-13 PROCEDURE — 93970 EXTREMITY STUDY: CPT | Mod: 26

## 2024-08-13 PROCEDURE — 99284 EMERGENCY DEPT VISIT MOD MDM: CPT | Mod: 25

## 2024-08-13 PROCEDURE — 73562 X-RAY EXAM OF KNEE 3: CPT | Mod: RT

## 2024-08-13 PROCEDURE — 99285 EMERGENCY DEPT VISIT HI MDM: CPT

## 2024-08-13 PROCEDURE — 73562 X-RAY EXAM OF KNEE 3: CPT | Mod: 26,RT

## 2024-08-13 PROCEDURE — 93970 EXTREMITY STUDY: CPT

## 2024-08-13 NOTE — ED PROVIDER NOTE - PATIENT PORTAL LINK FT
You can access the FollowMyHealth Patient Portal offered by [facility] by registering at the following website: [email or web address]. By joining [employer]’s FollowMyHealth portal, you will also be able to view your health information using other applications (apps) compatible with our system.

## 2024-08-13 NOTE — ED PROVIDER NOTE - CLINICAL SUMMARY MEDICAL DECISION MAKING FREE TEXT BOX
89-year-old female past medical history of hypertension, dyslipidemia, arthritis who presents to the emergency department for atraumatic right calf and knee pain since yesterday.  No falls or direct trauma patient does not remember any injuries.  No fever or signs of infectious etiology.  On exam, vital signs were reviewed.  Patient well-appearing.  Patient has small knee effusion but no ligament laxity.  Mild bony tenderness.  Mild calf tenderness.  X-rays done and show arthritis with small effusion.  Clinically, patient does not have an acute fracture.  DVT study shows Baker's cyst.  Will discharge with appropriate home care and patient to use topical anti-inflammatories.  Patient to follow-up with orthopedics and is aware she may require further workup and treatment for the Baker's cyst.  Strict return precautions discussed with daughter.

## 2024-08-13 NOTE — ED PROVIDER NOTE - DIFFERENTIAL DIAGNOSIS
The differential diagnosis for patients clinical presentation includes but is not limited to:  DVT, Bakers Cyst, fracture, septic joint Differential Diagnosis

## 2024-08-13 NOTE — ED PROVIDER NOTE - NSFOLLOWUPINSTRUCTIONS_ED_ALL_ED_FT
You were seen and evaluated for knee pain after bending down.  You had no direct trauma.  You had x-rays performed and you are given a copy of the results.  He had an ultrasound of the leg done that was consistent with a Baker's cyst.  You have a small effusion to the knee.  You are currently on Eliquis and we discussed further workup for the Baker's cyst and home care.  We recommend taking anti-inflammatory gel, Voltaren, twice a day topically to the knee that can be bought over-the-counter at the pharmacy.  Please take 650 mg of Tylenol every 6 hours as needed for pain.  We recommend follow-up with the orthopedist as you may require further workup, testing, treatment.  Please bring all results of follow-up.    Our Emergency Department Referral Coordinators will be reaching out to you in the next 24-48 hours from 9:00am to 5:00pm with a follow up appointment. Please expect a phone call from the hospital in that time frame. If you do not receive a call or if you have any questions or concerns, you can reach them at   [phone]    Overview  Injuries are a common cause of knee problems. Sudden (acute) injuries may be caused by a direct blow to the knee. They can also be caused by abnormal twisting, bending, or falling on the knee. Pain, bruising, or swelling may be severe, and may start within minutes of the injury.    Please follow up with the Orthopedist. You may require additional testing, such as an MRI, which shows the ligaments and meniscus in your knee.    Overuse is another cause of knee pain. Other causes are climbing stairs, kneeling, and other activities that use the knee. Everyday wear and tear, especially as you get older, also can cause knee pain.    Rest, along with home treatment, often relieves pain and allows your knee to heal. If you have a serious knee injury, you may need tests and treatment.    Follow-up care is a key part of your treatment and safety. Be sure to make and go to all appointments, and call your doctor or nurse advice line (115 in most provinces and territories) if you are having problems. It's also a good idea to know your test results and keep a list of the medicines you take.    How can you care for yourself at home?  Be safe with medicines. Read and follow all instructions on the label.  If the doctor gave you a prescription medicine for pain, take it as prescribed.  If you are not taking a prescription pain medicine, ask your doctor if you can take an over-the-counter medicine.  Rest and protect your knee. Take a break from any activity that may cause pain.  Put ice or a cold pack on your knee for 10 to 20 minutes at a time. Put a thin cloth between the ice and your skin.  Prop up a sore knee on a pillow when you ice it or anytime you sit or lie down for the next 3 days. Try to keep it above the level of your heart. This will help reduce swelling.  If your knee is not swollen, you can put moist heat, a heating pad, or a warm cloth on your knee.  If your doctor recommends an elastic bandage, sleeve, or other type of support for your knee, wear it as directed.  Follow your doctor's instructions about how much weight you can put on your leg. Use a cane, crutches, or a walker as instructed.  Follow your doctor's instructions about activity during your healing process. If you can do mild exercise, slowly increase your activity.  Stay at a healthy weight. Extra weight can strain the joints, especially the knees and hips, and make the pain worse. Losing a little weight may help.  When should you call for help?  	  Call 911 anytime you think you may need emergency care. For example, call if:    You have symptoms of a blood clot in your lung (called a pulmonary embolism). These may include:  Sudden chest pain.  Trouble breathing.  Coughing up blood.  Call your doctor or nurse advice line now or seek immediate medical care if:    You have severe or increasing pain.  Your leg or foot turns cold or changes colour.  You cannot stand or put weight on your knee.  Your knee looks twisted or bent out of shape.  You cannot move your knee.  You have signs of infection, such as:  Increased pain, swelling, warmth, or redness.  Red streaks leading from the knee.  Pus draining from a place on your knee.  A fever.  You have signs of a blood clot in your leg (called a deep vein thrombosis), such as:  Pain in your calf, back of the knee, thigh, or groin.  Redness and swelling in your leg or groin.  Watch closely for changes in your health, and be sure to contact your doctor or nurse advice line if:    You have tingling, weakness, or numbness in your knee.  You have any new symptoms, such as swelling.  You have bruises from a knee injury that last longer than 2 weeks.  You do not get better as expected.

## 2024-08-13 NOTE — ED ADULT NURSE NOTE - NSFALLHARMRISKINTERV_ED_ALL_ED
Assistance OOB with selected safe patient handling equipment if applicable/Assistance with ambulation/Communicate risk of Fall with Harm to all staff, patient, and family/Monitor gait and stability/Provide visual cue: red socks, yellow wristband, yellow gown, etc/Reinforce activity limits and safety measures with patient and family/Bed in lowest position, wheels locked, appropriate side rails in place/Call bell, personal items and telephone in reach/Instruct patient to call for assistance before getting out of bed/chair/stretcher/Non-slip footwear applied when patient is off stretcher/[place] to call system/Physically safe environment - no spills, clutter or unnecessary equipment/Purposeful Proactive Rounding/Room/bathroom lighting operational, light cord in reach

## 2024-08-13 NOTE — ED PROVIDER NOTE - OBJECTIVE STATEMENT
89 y/o female presents with family for right knee pain and swelling worse with movement x 24 hours. patient c/o soreness. no falls or heavy lifting. no distal tingling. no hip pain or abdominal pain . patient denies any back pain

## 2024-08-13 NOTE — ED PROVIDER NOTE - PHYSICAL EXAMINATION
generalized: alert, no distress  eyes: clear conjunctiva  msk: right knee- no laxity no bony deformity, good rom of extremity, good cap refill, pulses distally in tact, no crepitation   skin: no bruising, no swelling, no lacerations   neuro: alert, oriented, no motor or sensory deficits, gait steady

## 2024-08-14 ENCOUNTER — APPOINTMENT (OUTPATIENT)
Dept: ORTHOPEDIC SURGERY | Facility: CLINIC | Age: 89
End: 2024-08-14
Payer: MEDICARE

## 2024-08-14 VITALS — HEIGHT: 62 IN | WEIGHT: 125 LBS | BODY MASS INDEX: 23 KG/M2

## 2024-08-14 PROCEDURE — 20610 DRAIN/INJ JOINT/BURSA W/O US: CPT | Mod: RT

## 2024-08-14 PROCEDURE — 99213 OFFICE O/P EST LOW 20 MIN: CPT | Mod: 25

## 2024-08-14 NOTE — ASSESSMENT
[FreeTextEntry1] : Patient has knee osteoarthritis, knee effusion with Baker's cyst.  We agreed to do an aspiration of the knee effusion.  Today in the office in sterile conditions I aspirated suprapatella effusion 20 cc of blood.  Site covered with Band-Aid.  Patient tolerated it well.  We then proceeded with a cortisone injection in lateral joint space, puncture site covered with Band-Aid.  Icing if she has any soreness.  Knee wrapped with Ace bandage for compression.  Follow-up in several weeks for repeat evaluation if the pain worsens or continues.

## 2024-08-14 NOTE — HISTORY OF PRESENT ILLNESS
[de-identified] : 89-year-old female comes in today for an evaluation of her right knee pain.  Accompanied by her daughter today.  Went to  [facility] had an x-ray done and venous Doppler.  Patient states on Monday she went to stand up and felt pain and noticed swelling in her knee.  Is taking Eliquis blood thinner, history of A-fib.

## 2024-08-14 NOTE — IMAGING
[de-identified] : On examination of the right knee effusion is noted, swelling in the popliteal fossa consistent with Baker's cyst.  There is no ecchymosis, no erythema.  Skin is intact.  Stable straight leg raise.  Able to active flex and extend her knee although with restriction and pain to terminal knee flexion and extension.  Tenderness in the posterior knee, calf is soft nontender.  She has no pain along the medial or lateral line.  No tenderness over the patella, patella tendon quadricep tendon.  X-ray right knee reviewed from [facility]. Subtle lucency within the undersurface of the patella. Question osteochondral defect and therefore likely arthritic in nature. Nondisplaced fracture cannot be excluded. Correlate with clinical findings. Joint effusion. Osteoarthritic changes.  Venous Doppler- Normal compressibility of the RIGHT common femoral, femoral and popliteal veins. Doppler examination shows normal spontaneous and phasic flow. No RIGHT calf vein thrombosis is detected. 2.5 x 1.5 x 2.2 cm right popliteal fossa Baker's cyst Normal compressibility of the LEFT common femoral, femoral and popliteal veins. Doppler examination shows normal spontaneous and phasic flow. No LEFT calf vein thrombosis is detected.

## 2024-08-14 NOTE — HISTORY OF PRESENT ILLNESS
[de-identified] : 89-year-old female comes in today for an evaluation of her right knee pain.  Accompanied by her daughter today.  Went to  [facility] had an x-ray done and venous Doppler.  Patient states on Monday she went to stand up and felt pain and noticed swelling in her knee.  Is taking Eliquis blood thinner, history of A-fib.

## 2024-08-14 NOTE — IMAGING
[de-identified] : On examination of the right knee effusion is noted, swelling in the popliteal fossa consistent with Baker's cyst.  There is no ecchymosis, no erythema.  Skin is intact.  Stable straight leg raise.  Able to active flex and extend her knee although with restriction and pain to terminal knee flexion and extension.  Tenderness in the posterior knee, calf is soft nontender.  She has no pain along the medial or lateral line.  No tenderness over the patella, patella tendon quadricep tendon.  X-ray right knee reviewed from [facility] Osteopenia. Subtle lucency within the undersurface of the patella. Question osteochondral defect and therefore likely arthritic in nature. Nondisplaced fracture cannot be excluded. Correlate with clinical findings. Joint effusion. Osteoarthritic changes.  Venous Doppler- Normal compressibility of the RIGHT common femoral, femoral and popliteal veins. Doppler examination shows normal spontaneous and phasic flow. No RIGHT calf vein thrombosis is detected. 2.5 x 1.5 x 2.2 cm right popliteal fossa Baker's cyst Normal compressibility of the LEFT common femoral, femoral and popliteal veins. Doppler examination shows normal spontaneous and phasic flow. No LEFT calf vein thrombosis is detected.

## 2024-08-22 ENCOUNTER — APPOINTMENT (OUTPATIENT)
Dept: ORTHOPEDIC SURGERY | Facility: CLINIC | Age: 89
End: 2024-08-22
Payer: MEDICARE

## 2024-08-22 PROBLEM — M17.11 PRIMARY OSTEOARTHRITIS OF RIGHT KNEE: Status: ACTIVE | Noted: 2024-08-14

## 2024-08-22 PROCEDURE — 20610 DRAIN/INJ JOINT/BURSA W/O US: CPT | Mod: RT

## 2024-08-22 PROCEDURE — 73562 X-RAY EXAM OF KNEE 3: CPT | Mod: RT

## 2024-08-22 PROCEDURE — 99213 OFFICE O/P EST LOW 20 MIN: CPT | Mod: 25

## 2024-08-22 NOTE — HISTORY OF PRESENT ILLNESS
[de-identified] : 89-year-old female comes in today for follow-up with her daughter for her right knee.  Patient was seen last week for her right knee we did an aspiration and injection, she has been calling since the last appointment with continued pain and stiffness, so they came back in today.  There was no trauma.  She does have Baker's cyst, noted on Doppler.  Has been icing, heating.  Taking Tylenol.  Is on Eliquis blood thinner.  History of A-fib

## 2024-08-22 NOTE — ASSESSMENT
[FreeTextEntry1] : At this time patient wanted to attempt another aspiration.  Under sterile conditions 5 cc of blood-tinged fluid was obtained from the suprapatellar knee.  Tolerated it well.  Puncture site covered with gauze and an Ace bandage for compression.  We did discuss getting an MRI to rule out acute reason for the swelling, daughter kindly declined at this time.  Continue with icing Tylenol.  Has a follow-up with Dr. Ramirez that they are going to keep for his opinion in case her symptoms continue.

## 2024-08-28 ENCOUNTER — APPOINTMENT (OUTPATIENT)
Dept: ORTHOPEDIC SURGERY | Facility: CLINIC | Age: 89
End: 2024-08-28
Payer: MEDICARE

## 2024-08-28 DIAGNOSIS — M17.11 UNILATERAL PRIMARY OSTEOARTHRITIS, RIGHT KNEE: ICD-10-CM

## 2024-08-28 PROCEDURE — 20610 DRAIN/INJ JOINT/BURSA W/O US: CPT | Mod: RT

## 2024-08-28 PROCEDURE — 99204 OFFICE O/P NEW MOD 45 MIN: CPT | Mod: 25

## 2024-08-28 NOTE — DISCUSSION/SUMMARY
[de-identified] : SHE HAS BEEN DEALING WITH AN EXACERBATION OF RIGHT KNEE ARTHRITIS.  SHE WAS SEEN IN THE ER AND THEN SEEN HEARING GIVEN A CORTISONE INJECTION WHICH DID HELP BUT FOR SHORT TIME.  HER DISTAL PULSES ARE STRONG AND CAPILLARY REFILL IS GOOD AND SHE DOES NOT HAVE PERIPHERAL EDEMA.  SHE IS TREATING HERSELF WITH AN ACE BANDAGE WHICH DOES HELP SOME.  I HAVE ADVISED HER TO WALK WITH A WALKER AND TO WALK FURTHER.  I ADVISED HIM THAT SOMETIMES A REPEAT CORTISONE INJECTION LASTS LONGER.  AFTER DISCUSSING WITH THEM AT LENGTH THE NATURE OF THEIR PROBLEM AND THE ALTERNATIVES OF DIFFERENT TREATMENTS, WE FURTHER DISCUSSED THE RISKS AND BENEFITS AND EXPECTED OUTCOME OF A STEROID INJECTION WITH NUMBING MEDICINE.  THEY UNDERSTAND THAT THIS OFTEN WILL CAUSE AT LEAST TEMPORARY IMPROVEMENT.  THEY ALSO UNDERSTAND THAT IT HELPS US CONFIRM THAT THE AREA INJECTED IS THE PAIN GENERATOR.  THEY UNDERSTAND THE SMALL RISK OF INFECTION AND THAT SOMETIMES THE ANTI-INFLAMMATORY EFFECT CAN TAKE UP TO 2 WEEKS TO WORK.  THEY UNDERSTAND THAT USUALLY THE IMPROVEMENT LASTS A FEW MONTHS BUT OFTEN IT LASTS LESS TIME.  THEY DO UNDERSTAND THAT OCCASIONALLY THE INJECTION DOES PUT OUT THE INFLAMMATION AND THE PROBLEM GOES AWAY FOR A VERY EXTENDED TIMEFRAME.  THEY HAD THE  OPPORTUNITY TO HAVE ALL THEIR QUESTIONS ANSWERED AND UNDER STERILE CONDITIONS WE HAVE INJECTED THEM WITH LONG AND SHORT ACTING NUMBING MEDICINE (4 CC OF 2% MARCAINE OR EQUIVALENT AND 4 CC OF LIDOCAINE AND 20 MG OF DEXAMETHASONE) WITH GOOD IMMEDIATE RELIEF OF PAIN THE X-RAYS TAKEN IN OUR OFFICE SHOW MODERATE DJD AND NO LESIONS.  WE ALSO ADVISED HER TO TAKE UP TO 3000 MG OF TYLENOL EACH DAY. OVERALL PHYSICAL EXAM GENERAL: Well developed well nourished in no acute distress   MENTAL:Alert and oriented x3, normal affect, Pleasant and accompanied by family   MUSCULOSKELETAL: Ambulating independently   HEENT: NCAT, EOM intact, mucosa moist, neck supple with adequate free rom   CARDIOVASCULAR/HEMATOLOGICAL: no JVD, no peripheral edema, good capillary refill,  skin warm and well perfused, no venous stasis ulcers, pulses intact, no pallor or superficial non-traumatic bruising   NEUROLOGICAL: free passive rom without rigidity or cog wheel motion   RESPIRATORY: no gross stridor or wheezing or increased respiratory effort or use of O2, good capil refill and color   INTEGUMENTARY: skin intact without obvious suspicious lesions where examined

## 2024-08-28 NOTE — DISCUSSION/SUMMARY
[de-identified] : SHE HAS BEEN DEALING WITH AN EXACERBATION OF RIGHT KNEE ARTHRITIS.  SHE WAS SEEN IN THE ER AND THEN SEEN HEARING GIVEN A CORTISONE INJECTION WHICH DID HELP BUT FOR SHORT TIME.  HER DISTAL PULSES ARE STRONG AND CAPILLARY REFILL IS GOOD AND SHE DOES NOT HAVE PERIPHERAL EDEMA.  SHE IS TREATING HERSELF WITH AN ACE BANDAGE WHICH DOES HELP SOME.  I HAVE ADVISED HER TO WALK WITH A WALKER AND TO WALK FURTHER.  I ADVISED HIM THAT SOMETIMES A REPEAT CORTISONE INJECTION LASTS LONGER.  AFTER DISCUSSING WITH THEM AT LENGTH THE NATURE OF THEIR PROBLEM AND THE ALTERNATIVES OF DIFFERENT TREATMENTS, WE FURTHER DISCUSSED THE RISKS AND BENEFITS AND EXPECTED OUTCOME OF A STEROID INJECTION WITH NUMBING MEDICINE.  THEY UNDERSTAND THAT THIS OFTEN WILL CAUSE AT LEAST TEMPORARY IMPROVEMENT.  THEY ALSO UNDERSTAND THAT IT HELPS US CONFIRM THAT THE AREA INJECTED IS THE PAIN GENERATOR.  THEY UNDERSTAND THE SMALL RISK OF INFECTION AND THAT SOMETIMES THE ANTI-INFLAMMATORY EFFECT CAN TAKE UP TO 2 WEEKS TO WORK.  THEY UNDERSTAND THAT USUALLY THE IMPROVEMENT LASTS A FEW MONTHS BUT OFTEN IT LASTS LESS TIME.  THEY DO UNDERSTAND THAT OCCASIONALLY THE INJECTION DOES PUT OUT THE INFLAMMATION AND THE PROBLEM GOES AWAY FOR A VERY EXTENDED TIMEFRAME.  THEY HAD THE  OPPORTUNITY TO HAVE ALL THEIR QUESTIONS ANSWERED AND UNDER STERILE CONDITIONS WE HAVE INJECTED THEM WITH LONG AND SHORT ACTING NUMBING MEDICINE (4 CC OF 2% MARCAINE OR EQUIVALENT AND 4 CC OF LIDOCAINE AND 20 MG OF DEXAMETHASONE) WITH GOOD IMMEDIATE RELIEF OF PAIN THE X-RAYS TAKEN IN OUR OFFICE SHOW MODERATE DJD AND NO LESIONS.  WE ALSO ADVISED HER TO TAKE UP TO 3000 MG OF TYLENOL EACH DAY. OVERALL PHYSICAL EXAM GENERAL: Well developed well nourished in no acute distress   MENTAL:Alert and oriented x3, normal affect, Pleasant and accompanied by family   MUSCULOSKELETAL: Ambulating independently   HEENT: NCAT, EOM intact, mucosa moist, neck supple with adequate free rom   CARDIOVASCULAR/HEMATOLOGICAL: no JVD, no peripheral edema, good capillary refill,  skin warm and well perfused, no venous stasis ulcers, pulses intact, no pallor or superficial non-traumatic bruising   NEUROLOGICAL: free passive rom without rigidity or cog wheel motion   RESPIRATORY: no gross stridor or wheezing or increased respiratory effort or use of O2, good capil refill and color   INTEGUMENTARY: skin intact without obvious suspicious lesions where examined

## 2024-09-09 ENCOUNTER — APPOINTMENT (OUTPATIENT)
Dept: ORTHOPEDIC SURGERY | Facility: CLINIC | Age: 89
End: 2024-09-09

## 2024-09-09 DIAGNOSIS — M23.91 UNSPECIFIED INTERNAL DERANGEMENT OF RIGHT KNEE: ICD-10-CM

## 2024-09-09 DIAGNOSIS — M85.89 OTHER SPECIFIED DISORDERS OF BONE DENSITY AND STRUCTURE, MULTIPLE SITES: ICD-10-CM

## 2024-09-09 DIAGNOSIS — M17.11 UNILATERAL PRIMARY OSTEOARTHRITIS, RIGHT KNEE: ICD-10-CM

## 2024-09-09 PROCEDURE — 20610 DRAIN/INJ JOINT/BURSA W/O US: CPT | Mod: RT

## 2024-09-09 NOTE — DISCUSSION/SUMMARY
[de-identified] : SHE IS HERE FOR HER SYNVISC 1 INJECTION TO THE RIGHT KNEE.  WE PREPPED WITH BETADINE AND ALLOWED IT TO DRY.  WE INJECTED HER THROUGH AN ANTEROLATERAL LATERAL PORTAL WITHOUT ANY DIFFICULTY.  THEY UNDERSTAND THAT IF SHE GETS A FEVER OR HAS MORE DIFFICULTY WALKING OR MOVING HER KNEE THAT SHE SHOULD COME IN RIGHT AWAY EITHER TO SEE ME OR IMMEDIATE CARE.  SHE TOLERATED THIS WELL AND LEFT WITH HER DAUGHTER. OVERALL PHYSICAL EXAM GENERAL: Well developed well nourished in no acute distress   MENTAL:Alert and oriented x3, normal affect, Pleasant and accompanied by family   MUSCULOSKELETAL: Ambulating independently   HEENT: NCAT, EOM intact, mucosa moist, neck supple with adequate free rom   CARDIOVASCULAR/HEMATOLOGICAL: no JVD, no peripheral edema, good capillary refill,  skin warm and well perfused, no venous stasis ulcers, pulses intact, no pallor or superficial non-traumatic bruising   NEUROLOGICAL: free passive rom without rigidity or cog wheel motion   RESPIRATORY: no gross stridor or wheezing or increased respiratory effort or use of O2, good capil refill and color   INTEGUMENTARY: skin intact without obvious suspicious lesions where examined 
Statement Selected

## 2024-09-16 RX ORDER — LIDOCAINE 5% 700 MG/1
5 PATCH TOPICAL
Qty: 10 | Refills: 0 | Status: ACTIVE | COMMUNITY
Start: 2024-09-16 | End: 1900-01-01

## 2024-09-25 RX ORDER — LIDOCAINE 5% 700 MG/1
5 PATCH TOPICAL
Qty: 10 | Refills: 0 | Status: ACTIVE | COMMUNITY
Start: 2024-09-25 | End: 1900-01-01

## 2024-10-30 ENCOUNTER — APPOINTMENT (OUTPATIENT)
Dept: CARDIOLOGY | Facility: CLINIC | Age: 89
End: 2024-10-30
Payer: MEDICARE

## 2024-10-30 VITALS
BODY MASS INDEX: 22.45 KG/M2 | DIASTOLIC BLOOD PRESSURE: 74 MMHG | HEIGHT: 62 IN | SYSTOLIC BLOOD PRESSURE: 132 MMHG | HEART RATE: 42 BPM | OXYGEN SATURATION: 97 % | WEIGHT: 122 LBS

## 2024-10-30 DIAGNOSIS — Z86.718 PERSONAL HISTORY OF OTHER VENOUS THROMBOSIS AND EMBOLISM: ICD-10-CM

## 2024-10-30 DIAGNOSIS — M17.11 UNILATERAL PRIMARY OSTEOARTHRITIS, RIGHT KNEE: ICD-10-CM

## 2024-10-30 DIAGNOSIS — I82.409 ACUTE EMBOLISM AND THROMBOSIS OF UNSPECIFIED DEEP VEINS OF UNSPECIFIED LOWER EXTREMITY: ICD-10-CM

## 2024-10-30 DIAGNOSIS — I48.92 UNSPECIFIED ATRIAL FLUTTER: ICD-10-CM

## 2024-10-30 DIAGNOSIS — R55 SYNCOPE AND COLLAPSE: ICD-10-CM

## 2024-10-30 DIAGNOSIS — I10 ESSENTIAL (PRIMARY) HYPERTENSION: ICD-10-CM

## 2024-10-30 DIAGNOSIS — E78.5 HYPERLIPIDEMIA, UNSPECIFIED: ICD-10-CM

## 2024-10-30 PROCEDURE — G2211 COMPLEX E/M VISIT ADD ON: CPT

## 2024-10-30 PROCEDURE — 99213 OFFICE O/P EST LOW 20 MIN: CPT

## 2025-01-11 ENCOUNTER — EMERGENCY (EMERGENCY)
Facility: HOSPITAL | Age: 89
LOS: 0 days | Discharge: HOME | End: 2025-01-11
Attending: EMERGENCY MEDICINE
Payer: MEDICARE

## 2025-01-11 VITALS
OXYGEN SATURATION: 97 % | TEMPERATURE: 98 F | HEIGHT: 62 IN | DIASTOLIC BLOOD PRESSURE: 73 MMHG | SYSTOLIC BLOOD PRESSURE: 179 MMHG | RESPIRATION RATE: 18 BRPM | HEART RATE: 55 BPM | WEIGHT: 149.91 LBS

## 2025-01-11 VITALS
HEART RATE: 55 BPM | RESPIRATION RATE: 17 BRPM | DIASTOLIC BLOOD PRESSURE: 79 MMHG | OXYGEN SATURATION: 95 % | TEMPERATURE: 98 F | SYSTOLIC BLOOD PRESSURE: 129 MMHG

## 2025-01-11 DIAGNOSIS — R10.30 LOWER ABDOMINAL PAIN, UNSPECIFIED: ICD-10-CM

## 2025-01-11 DIAGNOSIS — R11.0 NAUSEA: ICD-10-CM

## 2025-01-11 DIAGNOSIS — R19.7 DIARRHEA, UNSPECIFIED: ICD-10-CM

## 2025-01-11 DIAGNOSIS — I10 ESSENTIAL (PRIMARY) HYPERTENSION: ICD-10-CM

## 2025-01-11 DIAGNOSIS — E78.5 HYPERLIPIDEMIA, UNSPECIFIED: ICD-10-CM

## 2025-01-11 DIAGNOSIS — R10.32 LEFT LOWER QUADRANT PAIN: ICD-10-CM

## 2025-01-11 DIAGNOSIS — R05.1 ACUTE COUGH: ICD-10-CM

## 2025-01-11 DIAGNOSIS — Z79.01 LONG TERM (CURRENT) USE OF ANTICOAGULANTS: ICD-10-CM

## 2025-01-11 DIAGNOSIS — Z98.891 HISTORY OF UTERINE SCAR FROM PREVIOUS SURGERY: Chronic | ICD-10-CM

## 2025-01-11 DIAGNOSIS — I48.91 UNSPECIFIED ATRIAL FIBRILLATION: ICD-10-CM

## 2025-01-11 DIAGNOSIS — Z90.49 ACQUIRED ABSENCE OF OTHER SPECIFIED PARTS OF DIGESTIVE TRACT: Chronic | ICD-10-CM

## 2025-01-11 LAB
ALBUMIN SERPL ELPH-MCNC: 4 G/DL — SIGNIFICANT CHANGE UP (ref 3.5–5.2)
ALP SERPL-CCNC: 73 U/L — SIGNIFICANT CHANGE UP (ref 30–115)
ALT FLD-CCNC: 16 U/L — SIGNIFICANT CHANGE UP (ref 0–41)
ANION GAP SERPL CALC-SCNC: 12 MMOL/L — SIGNIFICANT CHANGE UP (ref 7–14)
AST SERPL-CCNC: 24 U/L — SIGNIFICANT CHANGE UP (ref 0–41)
BASOPHILS # BLD AUTO: 0.03 K/UL — SIGNIFICANT CHANGE UP (ref 0–0.2)
BASOPHILS NFR BLD AUTO: 0.4 % — SIGNIFICANT CHANGE UP (ref 0–1)
BILIRUB SERPL-MCNC: 0.5 MG/DL — SIGNIFICANT CHANGE UP (ref 0.2–1.2)
BUN SERPL-MCNC: 17 MG/DL — SIGNIFICANT CHANGE UP (ref 10–20)
CALCIUM SERPL-MCNC: 9.6 MG/DL — SIGNIFICANT CHANGE UP (ref 8.4–10.5)
CHLORIDE SERPL-SCNC: 99 MMOL/L — SIGNIFICANT CHANGE UP (ref 98–110)
CO2 SERPL-SCNC: 28 MMOL/L — SIGNIFICANT CHANGE UP (ref 17–32)
CREAT SERPL-MCNC: 0.8 MG/DL — SIGNIFICANT CHANGE UP (ref 0.7–1.5)
EGFR: 67 ML/MIN/1.73M2 — SIGNIFICANT CHANGE UP
EGFR: 67 ML/MIN/1.73M2 — SIGNIFICANT CHANGE UP
EOSINOPHIL # BLD AUTO: 0.04 K/UL — SIGNIFICANT CHANGE UP (ref 0–0.7)
EOSINOPHIL NFR BLD AUTO: 0.5 % — SIGNIFICANT CHANGE UP (ref 0–8)
FLUAV AG NPH QL: SIGNIFICANT CHANGE UP
FLUBV AG NPH QL: SIGNIFICANT CHANGE UP
GLUCOSE SERPL-MCNC: 196 MG/DL — HIGH (ref 70–99)
HCT VFR BLD CALC: 42.6 % — SIGNIFICANT CHANGE UP (ref 37–47)
HGB BLD-MCNC: 14 G/DL — SIGNIFICANT CHANGE UP (ref 12–16)
IMM GRANULOCYTES NFR BLD AUTO: 0.5 % — HIGH (ref 0.1–0.3)
LIDOCAIN IGE QN: 16 U/L — SIGNIFICANT CHANGE UP (ref 7–60)
LYMPHOCYTES # BLD AUTO: 0.48 K/UL — LOW (ref 1.2–3.4)
LYMPHOCYTES # BLD AUTO: 5.7 % — LOW (ref 20.5–51.1)
MCHC RBC-ENTMCNC: 29.4 PG — SIGNIFICANT CHANGE UP (ref 27–31)
MCHC RBC-ENTMCNC: 32.9 G/DL — SIGNIFICANT CHANGE UP (ref 32–37)
MCV RBC AUTO: 89.3 FL — SIGNIFICANT CHANGE UP (ref 81–99)
MONOCYTES # BLD AUTO: 0.47 K/UL — SIGNIFICANT CHANGE UP (ref 0.1–0.6)
MONOCYTES NFR BLD AUTO: 5.6 % — SIGNIFICANT CHANGE UP (ref 1.7–9.3)
NEUTROPHILS # BLD AUTO: 7.31 K/UL — HIGH (ref 1.4–6.5)
NEUTROPHILS NFR BLD AUTO: 87.3 % — HIGH (ref 42.2–75.2)
NRBC # BLD: 0 /100 WBCS — SIGNIFICANT CHANGE UP (ref 0–0)
NRBC BLD-RTO: 0 /100 WBCS — SIGNIFICANT CHANGE UP (ref 0–0)
PLATELET # BLD AUTO: 153 K/UL — SIGNIFICANT CHANGE UP (ref 130–400)
PMV BLD: 9.7 FL — SIGNIFICANT CHANGE UP (ref 7.4–10.4)
POTASSIUM SERPL-MCNC: 3.9 MMOL/L — SIGNIFICANT CHANGE UP (ref 3.5–5)
POTASSIUM SERPL-SCNC: 3.9 MMOL/L — SIGNIFICANT CHANGE UP (ref 3.5–5)
PROT SERPL-MCNC: 6.7 G/DL — SIGNIFICANT CHANGE UP (ref 6–8)
RBC # BLD: 4.77 M/UL — SIGNIFICANT CHANGE UP (ref 4.2–5.4)
RBC # FLD: 14 % — SIGNIFICANT CHANGE UP (ref 11.5–14.5)
RSV RNA NPH QL NAA+NON-PROBE: SIGNIFICANT CHANGE UP
SARS-COV-2 RNA SPEC QL NAA+PROBE: SIGNIFICANT CHANGE UP
SODIUM SERPL-SCNC: 139 MMOL/L — SIGNIFICANT CHANGE UP (ref 135–146)
WBC # BLD: 8.37 K/UL — SIGNIFICANT CHANGE UP (ref 4.8–10.8)
WBC # FLD AUTO: 8.37 K/UL — SIGNIFICANT CHANGE UP (ref 4.8–10.8)

## 2025-01-11 PROCEDURE — 85025 COMPLETE CBC W/AUTO DIFF WBC: CPT

## 2025-01-11 PROCEDURE — 71045 X-RAY EXAM CHEST 1 VIEW: CPT

## 2025-01-11 PROCEDURE — 99285 EMERGENCY DEPT VISIT HI MDM: CPT

## 2025-01-11 PROCEDURE — 80053 COMPREHEN METABOLIC PANEL: CPT

## 2025-01-11 PROCEDURE — 71045 X-RAY EXAM CHEST 1 VIEW: CPT | Mod: 26

## 2025-01-11 PROCEDURE — 74177 CT ABD & PELVIS W/CONTRAST: CPT | Mod: 26

## 2025-01-11 PROCEDURE — 74177 CT ABD & PELVIS W/CONTRAST: CPT | Mod: MC

## 2025-01-11 PROCEDURE — 96374 THER/PROPH/DIAG INJ IV PUSH: CPT | Mod: XU

## 2025-01-11 PROCEDURE — 83690 ASSAY OF LIPASE: CPT

## 2025-01-11 PROCEDURE — 0241U: CPT

## 2025-01-11 PROCEDURE — 99284 EMERGENCY DEPT VISIT MOD MDM: CPT | Mod: 25

## 2025-01-11 RX ORDER — ONDANSETRON HCL/PF 4 MG/2 ML
1 VIAL (ML) INJECTION
Qty: 10 | Refills: 0
Start: 2025-01-11 | End: 2025-01-15

## 2025-01-11 RX ORDER — ACETAMINOPHEN 500 MG/5ML
975 LIQUID (ML) ORAL ONCE
Refills: 0 | Status: COMPLETED | OUTPATIENT
Start: 2025-01-11 | End: 2025-01-11

## 2025-01-11 RX ORDER — ONDANSETRON HCL/PF 4 MG/2 ML
4 VIAL (ML) INJECTION ONCE
Refills: 0 | Status: COMPLETED | OUTPATIENT
Start: 2025-01-11 | End: 2025-01-11

## 2025-01-11 RX ADMIN — Medication 1000 MILLILITER(S): at 12:13

## 2025-01-11 RX ADMIN — Medication 975 MILLIGRAM(S): at 12:12

## 2025-01-11 RX ADMIN — Medication 4 MILLIGRAM(S): at 12:12

## 2025-02-28 ENCOUNTER — APPOINTMENT (OUTPATIENT)
Dept: CARDIOLOGY | Facility: CLINIC | Age: 89
End: 2025-02-28
Payer: MEDICARE

## 2025-02-28 VITALS
DIASTOLIC BLOOD PRESSURE: 68 MMHG | SYSTOLIC BLOOD PRESSURE: 118 MMHG | HEART RATE: 54 BPM | WEIGHT: 122 LBS | BODY MASS INDEX: 22.45 KG/M2 | HEIGHT: 62 IN | OXYGEN SATURATION: 99 %

## 2025-02-28 DIAGNOSIS — R55 SYNCOPE AND COLLAPSE: ICD-10-CM

## 2025-02-28 DIAGNOSIS — E78.5 HYPERLIPIDEMIA, UNSPECIFIED: ICD-10-CM

## 2025-02-28 DIAGNOSIS — I10 ESSENTIAL (PRIMARY) HYPERTENSION: ICD-10-CM

## 2025-02-28 DIAGNOSIS — I48.92 UNSPECIFIED ATRIAL FLUTTER: ICD-10-CM

## 2025-02-28 DIAGNOSIS — I82.409 ACUTE EMBOLISM AND THROMBOSIS OF UNSPECIFIED DEEP VEINS OF UNSPECIFIED LOWER EXTREMITY: ICD-10-CM

## 2025-02-28 PROCEDURE — 99213 OFFICE O/P EST LOW 20 MIN: CPT

## 2025-03-27 ENCOUNTER — RX RENEWAL (OUTPATIENT)
Age: 89
End: 2025-03-27

## 2025-06-06 ENCOUNTER — APPOINTMENT (OUTPATIENT)
Dept: CARDIOLOGY | Facility: CLINIC | Age: 89
End: 2025-06-06
Payer: MEDICARE

## 2025-06-06 VITALS
HEART RATE: 47 BPM | BODY MASS INDEX: 21.9 KG/M2 | SYSTOLIC BLOOD PRESSURE: 124 MMHG | WEIGHT: 119 LBS | OXYGEN SATURATION: 94 % | DIASTOLIC BLOOD PRESSURE: 76 MMHG | HEIGHT: 62 IN

## 2025-06-06 PROCEDURE — G2211 COMPLEX E/M VISIT ADD ON: CPT

## 2025-06-06 PROCEDURE — 99213 OFFICE O/P EST LOW 20 MIN: CPT

## 2025-06-13 ENCOUNTER — APPOINTMENT (OUTPATIENT)
Dept: ORTHOPEDIC SURGERY | Facility: CLINIC | Age: 89
End: 2025-06-13
Payer: MEDICARE

## 2025-06-13 PROCEDURE — 73560 X-RAY EXAM OF KNEE 1 OR 2: CPT | Mod: RT

## 2025-06-13 PROCEDURE — 99213 OFFICE O/P EST LOW 20 MIN: CPT

## 2025-06-13 RX ORDER — DICLOFENAC SODIUM 3 G/100G
3 GEL TOPICAL TWICE DAILY
Qty: 100 | Refills: 2 | Status: ACTIVE | COMMUNITY
Start: 2025-06-13 | End: 1900-01-01

## 2025-06-17 NOTE — PATIENT PROFILE ADULT - FUNCTIONAL ASSESSMENT - DAILY ACTIVITY 2.
Patient notified of results. She did have questions about surgery. All questions answered.    3 = A little assistance

## 2025-07-25 ENCOUNTER — APPOINTMENT (OUTPATIENT)
Dept: ORTHOPEDIC SURGERY | Facility: CLINIC | Age: 89
End: 2025-07-25

## 2025-07-25 DIAGNOSIS — M17.11 UNILATERAL PRIMARY OSTEOARTHRITIS, RIGHT KNEE: ICD-10-CM

## 2025-09-16 ENCOUNTER — APPOINTMENT (OUTPATIENT)
Dept: ORTHOPEDIC SURGERY | Facility: CLINIC | Age: 89
End: 2025-09-16

## 2025-09-16 VITALS — BODY MASS INDEX: 22.66 KG/M2 | HEIGHT: 61 IN | WEIGHT: 120 LBS

## 2025-09-16 DIAGNOSIS — M17.11 UNILATERAL PRIMARY OSTEOARTHRITIS, RIGHT KNEE: ICD-10-CM
